# Patient Record
Sex: FEMALE | Race: WHITE | NOT HISPANIC OR LATINO | Employment: OTHER | URBAN - NONMETROPOLITAN AREA
[De-identification: names, ages, dates, MRNs, and addresses within clinical notes are randomized per-mention and may not be internally consistent; named-entity substitution may affect disease eponyms.]

---

## 2021-08-11 ENCOUNTER — NURSE TRIAGE (OUTPATIENT)
Dept: CALL CENTER | Facility: HOSPITAL | Age: 51
End: 2021-08-11

## 2021-08-11 NOTE — TELEPHONE ENCOUNTER
Call transferred from [facility].   Caller wanting to establish care with new PCP. Caller has been having current symptoms since May. Care advice per guideline. Discussed when to seek emergency care.  Call transferred back to [place] at the [facility]. Caller would like new patient appt with Dr. Carina Chan.  Reason for Disposition  • Palpitations    Additional Information  • Negative: Passed out (i.e., lost consciousness, collapsed and was not responding)  • Negative: Shock suspected (e.g., cold/pale/clammy skin, too weak to stand, low BP, rapid pulse)  • Negative: Difficult to awaken or acting confused (e.g., disoriented, slurred speech)  • Negative: Visible sweat on face or sweat dripping down face  • Negative: Unable to walk, or can only walk with assistance (e.g., requires support)  • Negative: [1] Received SHOCK from implantable cardiac defibrillator AND [2] persisting symptoms (i.e., palpitations, lightheadedness)  • Negative: [1] Dizziness, lightheadedness, or weakness AND [2] heart beating very rapidly (e.g., > 140 / minute)  • Negative: [1] Dizziness, lightheadedness, or weakness AND [2] heart beating very slowly  (e.g., < 50 / minute)  • Negative: Sounds like a life-threatening emergency to the triager  • Negative: Chest pain  • Negative: Implantable Cardiac Defibrillator (ICD) or a pacemaker symptoms or questions  • Negative: Difficulty breathing  • Negative: Dizziness, lightheadedness, or weakness  • Negative: [1] Heart beating very rapidly (e.g., > 140 / minute) AND [2] present now  (Exception: during exercise)  • Negative: Heart beating very slowly (e.g., < 50 / minute)  (Exception: athlete and heart rate normal for caller)  • Negative: New or worsened shortness of breath with activity (dyspnea on exertion)  • Negative: Patient sounds very sick or weak to the triager  • Negative: [1] Heart beating very rapidly (e.g., > 140 / minute) AND [2] not present now  (Exception: during exercise)  • Negative: [1] Skipped or  "extra beat(s) AND [2] increases with exercise or exertion  • Negative: [1] Skipped or extra beat(s) AND [2] occurs 4 or more times per minute  • Negative: New or worsened ankle swelling  • Negative: History of heart disease  (i.e., heart attack, bypass surgery, angina, angioplasty, CHF) (Exception: brief heartbeat symptoms that went away and now feels well)  • Negative: Age > 60 years (Exception: brief heartbeat symptoms that went away and now feels well)  • Negative: Taking water pill (i.e., diuretic) or heart medication (e.g., digoxin)  • Negative: Wearing a \"Holter monitor\" or \"cardiac event monitor\"  • Negative: [1] Received SHOCK from implantable cardiac defibrillator AND [2] now feels well  • Negative: Heart rhythm alert (e.g., \"you have irregular heartbeat\") from personal wearable device (e.g., Apple Watch)  • Negative: History of hyperthyroidism or taking thyroid medication  • Negative: Substance use (drug use) or misuse, known or suspected  • Negative: [1] ADHD AND [2] taking stimulant medication  • Negative: [1] Palpitations AND [2] no improvement after using CARE ADVICE  • Negative: Problems with anxiety or stress  • Negative: Palpitations are a chronic symptom (recurrent or ongoing AND present > 4 weeks)  • Negative: [1] Skipped or extra beat(s) AND [2] occurs < 4 times / minute    Answer Assessment - Initial Assessment Questions  1. DESCRIPTION: \"Please describe your heart rate or heartbeat that you are having\" (e.g., fast/slow, regular/irregular, skipped or extra beats, \"palpitations\")     Feels like a bit [place]  2. ONSET: \"When did it start?\" (Minutes, hours or days)   Since May started after second vaccine dose  3. DURATION: \"How long does it last\" (e.g., seconds, minutes, hours)  Last 15-20 minutes after lying down It will do it about 30 seconds apart gradulalu time will increase and will sitop  4. PATTERN \"Does it come and go, or has it been constant since it started?\"  \"Does it get worse with " "exertion?\"   \"Are you feeling it now?\"   comes and goes mostly when lying down  5. TAP: \"Using your hand, can you tap out what you are feeling on a chair or table in front of you, so that I can hear?\" (Note: not all patients can do this)        *No Answer*  6. HEART RATE: \"Can you tell me your heart rate?\" \"How many beats in 15 seconds?\"  (Note: not all patients can do this)        *No Answer*  7. RECURRENT SYMPTOM: \"Have you ever had this before?\" If Yes, ask: \"When was the last time?\" and \"What happened that time?\"       *No Answer*  8. CAUSE: \"What do you think is causing the palpitations?\"  Not sure  9. CARDIAC HISTORY: \"Do you have any history of heart disease?\" (e.g., heart attack, angina, bypass surgery, angioplasty, arrhythmia)    no heart problems     Mother had a flutter  10. OTHER SYMPTOMS: \"Do you have any other symptoms?\" (e.g., dizziness, chest pain, sweating, difficulty breathing)     Denies associated symptoms  11. PREGNANCY: \"Is there any chance you are pregnant?\" \"When was your last menstrual period?\"  na    Protocols used: HEART RATE AND HEARTBEAT QUESTIONS-ADULT-AH      "

## 2021-09-29 ENCOUNTER — OFFICE VISIT (OUTPATIENT)
Dept: INTERNAL MEDICINE | Facility: CLINIC | Age: 51
End: 2021-09-29

## 2021-09-29 VITALS
TEMPERATURE: 97.8 F | RESPIRATION RATE: 18 BRPM | DIASTOLIC BLOOD PRESSURE: 68 MMHG | HEART RATE: 70 BPM | WEIGHT: 185.8 LBS | SYSTOLIC BLOOD PRESSURE: 114 MMHG | BODY MASS INDEX: 29.16 KG/M2 | HEIGHT: 67 IN | OXYGEN SATURATION: 99 %

## 2021-09-29 DIAGNOSIS — E66.3 OVERWEIGHT WITH BODY MASS INDEX (BMI) OF 29 TO 29.9 IN ADULT: Chronic | ICD-10-CM

## 2021-09-29 DIAGNOSIS — M19.041 PRIMARY OSTEOARTHRITIS OF BOTH HANDS: Chronic | ICD-10-CM

## 2021-09-29 DIAGNOSIS — N95.2 POST-MENOPAUSAL ATROPHIC VAGINITIS: Chronic | ICD-10-CM

## 2021-09-29 DIAGNOSIS — N94.19 DYSPAREUNIA DUE TO MEDICAL CONDITION IN FEMALE: Chronic | ICD-10-CM

## 2021-09-29 DIAGNOSIS — Z12.11 COLON CANCER SCREENING: Primary | ICD-10-CM

## 2021-09-29 DIAGNOSIS — R00.2 PALPITATIONS: Chronic | ICD-10-CM

## 2021-09-29 DIAGNOSIS — M19.042 PRIMARY OSTEOARTHRITIS OF BOTH HANDS: Chronic | ICD-10-CM

## 2021-09-29 DIAGNOSIS — G43.009 MIGRAINE WITHOUT AURA AND WITHOUT STATUS MIGRAINOSUS, NOT INTRACTABLE: Chronic | ICD-10-CM

## 2021-09-29 DIAGNOSIS — F41.1 GENERALIZED ANXIETY DISORDER: Chronic | ICD-10-CM

## 2021-09-29 PROCEDURE — 90686 IIV4 VACC NO PRSV 0.5 ML IM: CPT | Performed by: INTERNAL MEDICINE

## 2021-09-29 PROCEDURE — 99204 OFFICE O/P NEW MOD 45 MIN: CPT | Performed by: INTERNAL MEDICINE

## 2021-09-29 PROCEDURE — 90471 IMMUNIZATION ADMIN: CPT | Performed by: INTERNAL MEDICINE

## 2021-09-29 RX ORDER — CHLORAL HYDRATE 500 MG
CAPSULE ORAL DAILY
COMMUNITY
End: 2022-08-23

## 2021-09-29 RX ORDER — UREA 10 %
27 LOTION (ML) TOPICAL DAILY
Qty: 90 TABLET | Refills: 1
Start: 2021-09-29

## 2021-09-29 RX ORDER — MULTIPLE VITAMINS W/ MINERALS TAB 9MG-400MCG
1 TAB ORAL DAILY
COMMUNITY

## 2021-09-30 ENCOUNTER — TELEPHONE (OUTPATIENT)
Dept: INTERNAL MEDICINE | Facility: CLINIC | Age: 51
End: 2021-09-30

## 2021-09-30 RX ORDER — PROGESTERONE 100 MG/1
100 CAPSULE ORAL DAILY
Qty: 30 CAPSULE | Refills: 11 | Status: SHIPPED | OUTPATIENT
Start: 2021-09-30 | End: 2021-12-16 | Stop reason: SDUPTHER

## 2021-09-30 NOTE — TELEPHONE ENCOUNTER
Call patient's pharmacy and call patient.  We are replacing the Climara Pro patch with Climara weekly patch and Prometrium 100 mg tablet.  Advised patient to take the tablet in the evenings since it will help with sleep.

## 2021-09-30 NOTE — TELEPHONE ENCOUNTER
combipatch is not covered by insurance spoke with pharmacy which indicated regal estradiol bi weekly will be covered by insurance and cost would be $30 (monthly) - please advise if you want to change medication

## 2021-10-04 ENCOUNTER — LAB (OUTPATIENT)
Dept: LAB | Facility: HOSPITAL | Age: 51
End: 2021-10-04

## 2021-10-04 DIAGNOSIS — R00.2 PALPITATIONS: ICD-10-CM

## 2021-10-04 DIAGNOSIS — E66.3 OVERWEIGHT WITH BODY MASS INDEX (BMI) OF 29 TO 29.9 IN ADULT: Chronic | ICD-10-CM

## 2021-10-04 DIAGNOSIS — N95.2 POST-MENOPAUSAL ATROPHIC VAGINITIS: Chronic | ICD-10-CM

## 2021-10-04 PROBLEM — M19.041 PRIMARY OSTEOARTHRITIS OF BOTH HANDS: Chronic | Status: ACTIVE | Noted: 2021-10-04

## 2021-10-04 PROBLEM — N94.19 DYSPAREUNIA DUE TO MEDICAL CONDITION IN FEMALE: Chronic | Status: ACTIVE | Noted: 2021-10-04

## 2021-10-04 PROBLEM — F41.1 GENERALIZED ANXIETY DISORDER: Chronic | Status: ACTIVE | Noted: 2021-10-04

## 2021-10-04 PROBLEM — M19.042 PRIMARY OSTEOARTHRITIS OF BOTH HANDS: Chronic | Status: ACTIVE | Noted: 2021-10-04

## 2021-10-04 PROBLEM — G43.009 MIGRAINE WITHOUT AURA AND WITHOUT STATUS MIGRAINOSUS, NOT INTRACTABLE: Chronic | Status: ACTIVE | Noted: 2021-10-04

## 2021-10-04 LAB
ALBUMIN SERPL-MCNC: 4.8 G/DL (ref 3.5–5.2)
ALBUMIN/GLOB SERPL: 2.4 G/DL
ALP SERPL-CCNC: 67 U/L (ref 39–117)
ALT SERPL W P-5'-P-CCNC: 14 U/L (ref 1–33)
ANION GAP SERPL CALCULATED.3IONS-SCNC: 10.6 MMOL/L (ref 5–15)
AST SERPL-CCNC: 16 U/L (ref 1–32)
BASOPHILS # BLD AUTO: 0.02 10*3/MM3 (ref 0–0.2)
BASOPHILS NFR BLD AUTO: 0.4 % (ref 0–1.5)
BILIRUB SERPL-MCNC: 0.6 MG/DL (ref 0–1.2)
BUN SERPL-MCNC: 10 MG/DL (ref 6–20)
BUN/CREAT SERPL: 14.9 (ref 7–25)
CALCIUM SPEC-SCNC: 9.6 MG/DL (ref 8.6–10.5)
CHLORIDE SERPL-SCNC: 104 MMOL/L (ref 98–107)
CHOLEST SERPL-MCNC: 180 MG/DL (ref 0–200)
CO2 SERPL-SCNC: 26.4 MMOL/L (ref 22–29)
CREAT SERPL-MCNC: 0.67 MG/DL (ref 0.57–1)
DEPRECATED RDW RBC AUTO: 44.7 FL (ref 37–54)
EOSINOPHIL # BLD AUTO: 0.09 10*3/MM3 (ref 0–0.4)
EOSINOPHIL NFR BLD AUTO: 1.8 % (ref 0.3–6.2)
ERYTHROCYTE [DISTWIDTH] IN BLOOD BY AUTOMATED COUNT: 12.3 % (ref 12.3–15.4)
GFR SERPL CREATININE-BSD FRML MDRD: 93 ML/MIN/1.73
GLOBULIN UR ELPH-MCNC: 2 GM/DL
GLUCOSE SERPL-MCNC: 103 MG/DL (ref 65–99)
HCT VFR BLD AUTO: 41.9 % (ref 34–46.6)
HDLC SERPL-MCNC: 58 MG/DL (ref 40–60)
HGB BLD-MCNC: 13.9 G/DL (ref 12–15.9)
IMM GRANULOCYTES # BLD AUTO: 0.01 10*3/MM3 (ref 0–0.05)
IMM GRANULOCYTES NFR BLD AUTO: 0.2 % (ref 0–0.5)
LDLC SERPL CALC-MCNC: 99 MG/DL (ref 0–100)
LDLC/HDLC SERPL: 1.66 {RATIO}
LYMPHOCYTES # BLD AUTO: 1.55 10*3/MM3 (ref 0.7–3.1)
LYMPHOCYTES NFR BLD AUTO: 31.4 % (ref 19.6–45.3)
MCH RBC QN AUTO: 32.5 PG (ref 26.6–33)
MCHC RBC AUTO-ENTMCNC: 33.2 G/DL (ref 31.5–35.7)
MCV RBC AUTO: 97.9 FL (ref 79–97)
MONOCYTES # BLD AUTO: 0.4 10*3/MM3 (ref 0.1–0.9)
MONOCYTES NFR BLD AUTO: 8.1 % (ref 5–12)
NEUTROPHILS NFR BLD AUTO: 2.87 10*3/MM3 (ref 1.7–7)
NEUTROPHILS NFR BLD AUTO: 58.1 % (ref 42.7–76)
NRBC BLD AUTO-RTO: 0 /100 WBC (ref 0–0.2)
PLATELET # BLD AUTO: 224 10*3/MM3 (ref 140–450)
PMV BLD AUTO: 11.1 FL (ref 6–12)
POTASSIUM SERPL-SCNC: 4 MMOL/L (ref 3.5–5.2)
PROT SERPL-MCNC: 6.8 G/DL (ref 6–8.5)
RBC # BLD AUTO: 4.28 10*6/MM3 (ref 3.77–5.28)
SODIUM SERPL-SCNC: 141 MMOL/L (ref 136–145)
TRIGL SERPL-MCNC: 128 MG/DL (ref 0–150)
TSH SERPL DL<=0.05 MIU/L-ACNC: 1.35 UIU/ML (ref 0.27–4.2)
VLDLC SERPL-MCNC: 23 MG/DL (ref 5–40)
WBC # BLD AUTO: 4.94 10*3/MM3 (ref 3.4–10.8)

## 2021-10-04 PROCEDURE — 82306 VITAMIN D 25 HYDROXY: CPT

## 2021-10-04 PROCEDURE — 80053 COMPREHEN METABOLIC PANEL: CPT

## 2021-10-04 PROCEDURE — 84443 ASSAY THYROID STIM HORMONE: CPT

## 2021-10-04 PROCEDURE — 81001 URINALYSIS AUTO W/SCOPE: CPT

## 2021-10-04 PROCEDURE — 80061 LIPID PANEL: CPT

## 2021-10-04 PROCEDURE — 85025 COMPLETE CBC W/AUTO DIFF WBC: CPT

## 2021-10-05 LAB
25(OH)D3 SERPL-MCNC: 36.1 NG/ML (ref 30–100)
BACTERIA UR QL AUTO: NORMAL /HPF
BILIRUB UR QL STRIP: NEGATIVE
CLARITY UR: CLEAR
COLOR UR: YELLOW
GLUCOSE UR STRIP-MCNC: NEGATIVE MG/DL
HGB UR QL STRIP.AUTO: NEGATIVE
HYALINE CASTS UR QL AUTO: NORMAL /LPF
KETONES UR QL STRIP: NEGATIVE
LEUKOCYTE ESTERASE UR QL STRIP.AUTO: ABNORMAL
NITRITE UR QL STRIP: NEGATIVE
PH UR STRIP.AUTO: 7 [PH] (ref 5–8)
PROT UR QL STRIP: NEGATIVE
RBC # UR: NORMAL /HPF
REF LAB TEST METHOD: NORMAL
SP GR UR STRIP: 1.01 (ref 1–1.03)
SQUAMOUS #/AREA URNS HPF: NORMAL /HPF
UROBILINOGEN UR QL STRIP: ABNORMAL
WBC UR QL AUTO: NORMAL /HPF

## 2021-10-10 PROBLEM — Z80.0 FAMILY HISTORY OF COLON CANCER IN MOTHER: Chronic | Status: ACTIVE | Noted: 2021-10-10

## 2021-10-10 NOTE — PATIENT INSTRUCTIONS
Patient Instructions   Problem List Items Addressed This Visit        Cardiac and Vasculature    Palpitations (Chronic)    Overview     Palpitations may be stress and anxiety related.     Check labs and rule out thyroid disease.    Decrease caffeine and chocolate in the diet.           Relevant Orders    CBC & Differential (Completed)    Comprehensive Metabolic Panel (Completed)    TSH (Completed)    Urinalysis With Microscopic - Urine, Clean Catch (Completed)       Genitourinary and Reproductive     Dyspareunia due to medical condition in female (Chronic)    Overview     See above.         Post-menopausal atrophic vaginitis (Chronic)    Overview     Hormone replacement therapy benefits and risks discussed in detail.  HRT prescribed.    She will stop over-the-counter amberin menopausal treatment.    New medication added today. Benefits and possible side effects discussed. Patient verbalized understanding.           Relevant Orders    Vitamin D 25 Hydroxy (Completed)       Mental Health    Generalized anxiety disorder (Chronic)    Overview     Anxiety and high levels of stress recently.  Menopause is probably also contributing to her anxiety.    Hormone replacement therapy started today.    Check labs including thyroid.    If not improving, will consider a low-dose antidepressant or buspirone.            Musculoskeletal and Injuries    Primary osteoarthritis of both hands (Chronic)    Overview     May take diclofenac with food up to twice a day as needed.  May also try over-the-counter arthritis creams and rubs.            Neuro    Migraine without aura and without status migrainosus, not intractable (Chronic)    Overview     Migraine symptoms include nausea, fatigue, general arthralgias and myalgias, and light sensitivity.  She has headache pain with the symptoms about 50% of the time.    May take diclofenac with food twice a day as needed.    Track headaches on the calendar and watch for triggers.         Relevant  Medications    diclofenac (VOLTAREN) 50 MG EC tablet       Other    Overweight with body mass index (BMI) of 29 to 29.9 in adult (Chronic)    Overview     10/4/2021 Carina Chan MD    Check labs.    Continue regular exercise.  Increase aerobic exercise to at least 30 minutes daily.    Continue to improve low-fat low sugar diet.  Avoid white carbohydrates, glutens, dairy, processed foods.    Weigh every Monday morning to monitor progress.         Relevant Orders    Lipid Panel (Completed)      Other Visit Diagnoses     Colon cancer screening    -  Primary    Relevant Orders    Ambulatory Referral For Screening Colonoscopy             BMI for Adults  What is BMI?  Body mass index (BMI) is a number that is calculated from a person's weight and height. BMI can help estimate how much of a person's weight is composed of fat. BMI does not measure body fat directly. Rather, it is an alternative to procedures that directly measure body fat, which can be difficult and expensive.  BMI can help identify people who may be at higher risk for certain medical problems.  What are BMI measurements used for?  BMI is used as a screening tool to identify possible weight problems. It helps determine whether a person is obese, overweight, a healthy weight, or underweight.  BMI is useful for:  · Identifying a weight problem that may be related to a medical condition or may increase the risk for medical problems.  · Promoting changes, such as changes in diet and exercise, to help reach a healthy weight. BMI screening can be repeated to see if these changes are working.  How is BMI calculated?  BMI involves measuring your weight in relation to your height. Both height and weight are measured, and the BMI is calculated from those numbers. This can be done either in English ([place] or metric measurements. Note that charts and online BMI calculators are available to help you find your BMI quickly and easily without having to do these  "calculations yourself.  To calculate your BMI in English ([place] measurements:    1. Measure your weight in pounds (lb).  2. Multiply the number of pounds by 703.  ? For example, for a person who weighs 180 lb, multiply that number by 703, which equals 126,540.  3. Measure your height in inches. Then multiply that number by itself to get a measurement called \"inches squared.\"  ? For example, for a person who is 70 inches tall, the \"inches squared\" measurement is 70 inches x 70 inches, which equals 4,900 inches squared.  4. Divide the total from step 2 (number of lb x 703) by the total from step 3 (inches squared): 126,540 ÷ 4,900 = 25.8. This is your BMI.    To calculate your BMI in metric measurements:  1. Measure your weight in kilograms (kg).  2. Measure your height in meters (m). Then multiply that number by itself to get a measurement called \"meters squared.\"  ? For example, for a person who is 1.75 m tall, the \"meters squared\" measurement is 1.75 m x 1.75 m, which is equal to 3.1 meters squared.  3. Divide the number of kilograms (your weight) by the meters squared number. In this example: 70 ÷ 3.1 = 22.6. This is your BMI.  What do the results mean?  BMI charts are used to identify whether you are underweight, normal weight, overweight, or obese. The following guidelines will be used:  · Underweight: BMI less than 18.5.  · Normal weight: BMI between 18.5 and 24.9.  · Overweight: BMI between 25 and 29.9.  · Obese: BMI of 30 or above.  Keep these notes in mind:  · Weight includes both fat and muscle, so someone with a muscular build, such as an athlete, may have a BMI that is higher than 24.9. In cases like these, BMI is not an accurate measure of body fat.  · To determine if excess body fat is the cause of a BMI of 25 or higher, further assessments may need to be done by a health care provider.  · BMI is usually interpreted in the same way for men and women.  Where to find more information  For more information " about BMI, including tools to quickly calculate your BMI, go to these websites:  · Centers for Disease Control and Prevention: www.cdc.gov  · American Heart Association: www.heart.org  · National Heart, Lung, and Blood [place]: www.nhlbi.nih.gov  Summary  · Body mass index (BMI) is a number that is calculated from a person's weight and height.  · BMI may help estimate how much of a person's weight is composed of fat. BMI can help identify those who may be at higher risk for certain medical problems.  · BMI can be measured using English measurements or metric measurements.  · BMI charts are used to identify whether you are underweight, normal weight, overweight, or obese.  This information is not intended to replace advice given to you by your health care provider. Make sure you discuss any questions you have with your health care provider.  Document Revised: 09/09/2020 Document Reviewed: 07/17/2020  [employer] Patient Education © 2021 [employer]      Calorie Counting for Weight Loss  Calories are units of energy. Your body needs a certain number of calories from food to keep going throughout the day. When you eat or drink more calories than your body needs, your body stores the extra calories mostly as fat. When you eat or drink fewer calories than your body needs, your body burns fat to get the energy it needs.  Calorie counting means keeping track of how many calories you eat and drink each day. Calorie counting can be helpful if you need to lose weight. If you eat fewer calories than your body needs, you should lose weight. Ask your health care provider what a healthy weight is for you.  For calorie counting to work, you will need to eat the right number of calories each day to lose a healthy amount of weight per week. A dietitian can help you figure out how many calories you need in a day and will suggest ways to reach your calorie goal.  · A healthy amount of weight to lose each week is usually 1-2 lb  (0.5-0.9 kg). This usually means that your daily calorie intake should be reduced by 500-750 calories.  · Eating 1,200-1,500 calories a day can help most women lose weight.  · Eating 1,500-1,800 calories a day can help most men lose weight.  What do I need to know about calorie counting?  Work with your health care provider or dietitian to determine how many calories you should get each day. To meet your daily calorie goal, you will need to:  · Find out how many calories are in each food that you would like to eat. Try to do this before you eat.  · Decide how much of the food you plan to eat.  · Keep a food log. Do this by writing down what you ate and how many calories it had.  To successfully lose weight, it is important to balance calorie counting with a healthy lifestyle that includes regular activity.  Where do I find calorie information?    The number of calories in a food can be found on a Nutrition Facts label. If a food does not have a Nutrition Facts label, try to look up the calories online or ask your dietitian for help.  Remember that calories are listed per serving. If you choose to have more than one serving of a food, you will have to multiply the calories per serving by the number of servings you plan to eat. For example, the label on a package of bread might say that a serving size is 1 slice and that there are 90 calories in a serving. If you eat 1 slice, you will have eaten 90 calories. If you eat 2 slices, you will have eaten 180 calories.  How do I keep a food log?  After each time that you eat, record the following in your food log as soon as possible:  · What you ate. Be sure to include toppings, sauces, and other extras on the food.  · How much you ate. This can be measured in cups, ounces, or number of items.  · How many calories were in each food and drink.  · The total number of calories in the food you ate.  Keep your food log near you, such as in a pocket-sized notebook or on an mendel or  website on your mobile phone. Some programs will calculate calories for you and show you how many calories you have left to meet your daily goal.  What are some portion-control tips?  · Know how many calories are in a serving. This will help you know how many servings you can have of a certain food.  · Use a measuring cup to measure serving sizes. You could also try weighing out portions on a kitchen scale. With time, you will be able to estimate serving sizes for some foods.  · Take time to put servings of different foods on your favorite plates or in your favorite bowls and cups so you know what a serving looks like.  · Try not to eat straight from a food's packaging, such as from a bag or box. Eating straight from the package makes it hard to see how much you are eating and can lead to overeating. Put the amount you would like to eat in a cup or on a plate to make sure you are eating the right portion.  · Use smaller plates, glasses, and bowls for smaller portions and to prevent overeating.  · Try not to multitask. For example, avoid watching TV or using your computer while eating. If it is time to eat, sit down at a table and enjoy your food. This will help you recognize when you are full. It will also help you be more mindful of what and how much you are eating.  What are tips for following this plan?  Reading food labels  · Check the calorie count compared with the serving size. The serving size may be smaller than what you are used to eating.  · Check the source of the calories. Try to choose foods that are high in protein, fiber, and vitamins, and low in saturated fat, trans fat, and sodium.  Shopping  · Read nutrition labels while you shop. This will help you make healthy decisions about which foods to buy.  · Pay attention to nutrition labels for low-fat or fat-free foods. These foods sometimes have the same number of calories or more calories than the full-fat versions. They also often have added sugar,  starch, or salt to make up for flavor that was removed with the fat.  · Make a grocery list of lower-calorie foods and stick to it.  Cooking  · Try to cook your favorite foods in a healthier way. For example, try baking instead of frying.  · Use low-fat dairy products.  Meal planning  · Use more fruits and vegetables. One-half of your plate should be fruits and vegetables.  · Include lean proteins, such as chicken, turkey, and fish.  Lifestyle  Each week, aim to do one of the following:  · 150 minutes of moderate exercise, such as walking.  · 75 minutes of vigorous exercise, such as running.  General information  · Know how many calories are in the foods you eat most often. This will help you calculate calorie counts faster.  · Find a way of tracking calories that works for you. Get creative. Try different apps or programs if writing down calories does not work for you.  What foods should I eat?    · Eat nutritious foods. It is better to have a nutritious, high-calorie food, such as an avocado, than a food with few nutrients, such as a bag of potato chips.  · Use your calories on foods and drinks that will fill you up and will not leave you hungry soon after eating.  ? Examples of foods that fill you up are nuts and nut butters, vegetables, lean proteins, and high-fiber foods such as whole grains. High-fiber foods are foods with more than 5 g of fiber per serving.  · Pay attention to calories in drinks. Low-calorie drinks include water and unsweetened drinks.  The items listed above may not be a complete list of foods and beverages you can eat. Contact a dietitian for more information.  What foods should I limit?  Limit foods or drinks that are not good sources of vitamins, minerals, or protein or that are high in unhealthy fats. These include:  · Candy.  · Other sweets.  · Sodas, specialty coffee drinks, alcohol, and juice.  The items listed above may not be a complete list of foods and beverages you should avoid.  Contact a dietitian for more information.  How do I count calories when eating out?  · Pay attention to portions. Often, portions are much larger when eating out. Try these tips to keep portions smaller:  ? Consider sharing a meal instead of getting your own.  ? If you get your own meal, eat only half of it. Before you start eating, ask for a container and put half of your meal into it.  ? When available, consider ordering smaller portions from the menu instead of full portions.  · Pay attention to your food and drink choices. Knowing the way food is cooked and what is included with the meal can help you eat fewer calories.  ? If calories are listed on the menu, choose the lower-calorie options.  ? Choose dishes that include vegetables, fruits, whole grains, low-fat dairy products, and lean proteins.  ? Choose items that are boiled, broiled, grilled, or steamed. Avoid items that are buttered, battered, fried, or served with cream sauce. Items labeled as crispy are usually fried, unless stated otherwise.  ? Choose water, low-fat milk, unsweetened iced tea, or other drinks without added sugar. If you want an alcoholic beverage, choose a lower-calorie option, such as a glass of wine or light beer.  ? Ask for dressings, sauces, and syrups on the side. These are usually high in calories, so you should limit the amount you eat.  ? If you want a salad, choose a garden salad and ask for grilled meats. Avoid extra toppings such as izquierdo, cheese, or fried items. Ask for the dressing on the side, or ask for olive oil and vinegar or lemon to use as dressing.  · Estimate how many servings of a food you are given. Knowing serving sizes will help you be aware of how much food you are eating at restaurants.  Where to find more information  · Centers for Disease Control and Prevention: www.cdc.gov  · [place]. Department of Agriculture: myplate.gov  Summary  · Calorie counting means keeping track of how many calories you eat and drink  each day. If you eat fewer calories than your body needs, you should lose weight.  · A healthy amount of weight to lose per week is usually 1-2 lb (0.5-0.9 kg). This usually means reducing your daily calorie intake by 500-750 calories.  · The number of calories in a food can be found on a Nutrition Facts label. If a food does not have a Nutrition Facts label, try to look up the calories online or ask your dietitian for help.  · Use smaller plates, glasses, and bowls for smaller portions and to prevent overeating.  · Use your calories on foods and drinks that will fill you up and not leave you hungry shortly after a meal.  This information is not intended to replace advice given to you by your health care provider. Make sure you discuss any questions you have with your health care provider.  Document Revised: 01/28/2021 Document Reviewed: 01/28/2021  [employer] Patient Education © 2021 [employer]      Exercising to Lose Weight  Exercise is structured, repetitive physical activity to improve fitness and health. Getting regular exercise is important for everyone. It is especially important if you are overweight. Being overweight increases your risk of heart disease, stroke, diabetes, high blood pressure, and several types of cancer. Reducing your calorie intake and exercising can help you lose weight.  Exercise is usually categorized as moderate or vigorous intensity. To lose weight, most people need to do a certain amount of moderate-intensity or vigorous-intensity exercise each week.  Moderate-intensity exercise    Moderate-intensity exercise is any activity that gets you moving enough to burn at least three times more energy (calories) than if you were sitting.  Examples of moderate exercise include:  · Walking a mile in 15 minutes.  · Doing light yard work.  · Biking at an easy pace.  Most people should get at least 150 minutes (2 hours and 30 minutes) a week of moderate-intensity exercise to maintain their body  weight.  Vigorous-intensity exercise  Vigorous-intensity exercise is any activity that gets you moving enough to burn at least six times more calories than if you were sitting. When you exercise at this intensity, you should be working hard enough that you are not able to carry on a conversation.  Examples of vigorous exercise include:  · Running.  · Playing a team sport, such as football, basketball, and soccer.  · Jumping rope.  Most people should get at least 75 minutes (1 hour and 15 minutes) a week of vigorous-intensity exercise to maintain their body weight.  How can exercise affect me?  When you exercise enough to burn more calories than you eat, you lose weight. Exercise also reduces body fat and builds muscle. The more muscle you have, the more calories you burn. Exercise also:  · Improves mood.  · Reduces stress and tension.  · Improves your overall fitness, flexibility, and endurance.  · Increases bone strength.  The amount of exercise you need to lose weight depends on:  · Your age.  · The type of exercise.  · Any health conditions you have.  · Your overall physical ability.  Talk to your health care provider about how much exercise you need and what types of activities are safe for you.  What actions can I take to lose weight?  Nutrition    · Make changes to your diet as told by your health care provider or diet and nutrition specialist (dietitian). This may include:  ? Eating fewer calories.  ? Eating more protein.  ? Eating less unhealthy fats.  ? Eating a diet that includes fresh fruits and vegetables, whole grains, low-fat dairy products, and lean protein.  ? Avoiding foods with added fat, salt, and sugar.  · Drink plenty of water while you exercise to prevent dehydration or heat stroke.    Activity  · Choose an activity that you enjoy and set realistic goals. Your health care provider can help you make an exercise plan that works for you.  · Exercise at a moderate or vigorous intensity most days of  the week.  ? The intensity of exercise may vary from person to person. You can tell how intense a workout is for you by paying attention to your breathing and heartbeat. Most people will notice their breathing and heartbeat get faster with more intense exercise.  · Do resistance training twice each week, such as:  ? Push-ups.  ? Sit-ups.  ? Lifting weights.  ? Using resistance bands.  · Getting short amounts of exercise can be just as helpful as long structured periods of exercise. If you have trouble finding time to exercise, try to include exercise in your daily routine.  ? Get up, stretch, and walk around every 30 minutes throughout the day.  ? Go for a walk during your lunch break.  ? Park your car farther away from your destination.  ? If you take public transportation, get off one stop early and walk the rest of the way.  ? Make phone calls while standing up and walking around.  ? Take the stairs instead of elevators or escalators.  · Wear comfortable clothes and shoes with good support.  · Do not exercise so much that you hurt yourself, feel dizzy, or get very short of breath.  Where to find more information  · [place] Department of Health and Human Services: www.hhs.gov  · Centers for Disease Control and Prevention (CDC): www.cdc.gov  Contact a health care provider:  · Before starting a new exercise program.  · If you have questions or concerns about your weight.  · If you have a medical problem that keeps you from exercising.  Get help right away if you have any of the following while exercising:  · Injury.  · Dizziness.  · Difficulty breathing or shortness of breath that does not go away when you stop exercising.  · Chest pain.  · Rapid heartbeat.  Summary  · Being overweight increases your risk of heart disease, stroke, diabetes, high blood pressure, and several types of cancer.  · Losing weight happens when you burn more calories than you eat.  · Reducing the amount of calories you eat in addition to  getting regular moderate or vigorous exercise each week helps you lose weight.  This information is not intended to replace advice given to you by your health care provider. Make sure you discuss any questions you have with your health care provider.  Document Revised: 04/15/2021 Document Reviewed: 04/15/2021  [employer] Patient Education © 2021 [employer]

## 2021-10-21 DIAGNOSIS — Z12.11 SCREENING FOR COLON CANCER: Primary | ICD-10-CM

## 2021-10-21 RX ORDER — SODIUM, POTASSIUM,MAG SULFATES 17.5-3.13G
1 SOLUTION, RECONSTITUTED, ORAL ORAL TAKE AS DIRECTED
Qty: 354 ML | Refills: 0 | Status: SHIPPED | OUTPATIENT
Start: 2021-10-21

## 2021-12-10 ENCOUNTER — TELEPHONE (OUTPATIENT)
Dept: INTERNAL MEDICINE | Facility: CLINIC | Age: 51
End: 2021-12-10

## 2021-12-10 NOTE — TELEPHONE ENCOUNTER
Caller: Laine Hernandez    Relationship: Self    Best call back number: [phone]    Requested Prescriptions:   PATCHES USED FOR MENOPAUSE    Pharmacy where request should be sent: [facility] [address] [place]  [address] [place] [address] AT [address] [phone] [facility] [phone]      Additional details provided by patient: PATIENT IS OUT OF TOWN AND FORGOT HER PATCHES. PATIENT STATES HOT FLASHES STARTED IMMEDIATLEY    Does the patient have less than a 3 day supply:  [x] Yes  [] No    Kandace Lopez Rep   12/10/21 11:42 EST

## 2021-12-16 ENCOUNTER — OFFICE VISIT (OUTPATIENT)
Dept: INTERNAL MEDICINE | Facility: CLINIC | Age: 51
End: 2021-12-16

## 2021-12-16 VITALS
TEMPERATURE: 98.4 F | SYSTOLIC BLOOD PRESSURE: 112 MMHG | OXYGEN SATURATION: 98 % | HEART RATE: 71 BPM | HEIGHT: 67 IN | DIASTOLIC BLOOD PRESSURE: 72 MMHG | BODY MASS INDEX: 29.16 KG/M2 | RESPIRATION RATE: 16 BRPM | WEIGHT: 185.8 LBS

## 2021-12-16 DIAGNOSIS — R00.2 PALPITATIONS: Primary | Chronic | ICD-10-CM

## 2021-12-16 DIAGNOSIS — N95.2 POST-MENOPAUSAL ATROPHIC VAGINITIS: Chronic | ICD-10-CM

## 2021-12-16 DIAGNOSIS — N94.19 DYSPAREUNIA DUE TO MEDICAL CONDITION IN FEMALE: Chronic | ICD-10-CM

## 2021-12-16 DIAGNOSIS — M19.042 PRIMARY OSTEOARTHRITIS OF BOTH HANDS: Chronic | ICD-10-CM

## 2021-12-16 DIAGNOSIS — E66.3 OVERWEIGHT WITH BODY MASS INDEX (BMI) OF 29 TO 29.9 IN ADULT: Chronic | ICD-10-CM

## 2021-12-16 DIAGNOSIS — Z23 NEED FOR VACCINATION: ICD-10-CM

## 2021-12-16 DIAGNOSIS — N95.9 MENOPAUSAL AND POSTMENOPAUSAL DISORDER: Chronic | ICD-10-CM

## 2021-12-16 DIAGNOSIS — M19.041 PRIMARY OSTEOARTHRITIS OF BOTH HANDS: Chronic | ICD-10-CM

## 2021-12-16 PROBLEM — F41.1 GENERALIZED ANXIETY DISORDER: Chronic | Status: RESOLVED | Noted: 2021-10-04 | Resolved: 2021-12-16

## 2021-12-16 PROCEDURE — 90471 IMMUNIZATION ADMIN: CPT | Performed by: INTERNAL MEDICINE

## 2021-12-16 PROCEDURE — 90750 HZV VACC RECOMBINANT IM: CPT | Performed by: INTERNAL MEDICINE

## 2021-12-16 PROCEDURE — 99214 OFFICE O/P EST MOD 30 MIN: CPT | Performed by: INTERNAL MEDICINE

## 2021-12-16 RX ORDER — PROGESTERONE 100 MG/1
100 CAPSULE ORAL DAILY
Qty: 90 CAPSULE | Refills: 3 | Status: SHIPPED | OUTPATIENT
Start: 2021-12-16 | End: 2022-12-21 | Stop reason: SDUPTHER

## 2021-12-16 NOTE — PROGRESS NOTES
[facility]     Laine Hernandez  1970   [id number]      Patient Care Team:  Carina Chan MD as PCP - General (Internal Medicine)    Chief Complaint   Patient presents with   • Palpitations     f/u   • Abnormal Lab     discuss lipid panel    • Med Refill     discuss which medications needed    • Immunizations     discuss booster             HPI  Patient is a 51 y.o. female presents with  Palpitations have improved over last 6 wks. she has found that they seem to be exacerbated by alcohol sometimes and blood sugars.  She has not found 1 cup of coffee a day to be a problem.  She also thinks she probably has not had a migraine since she started HRT in October.    HPI  She ask about mixing and matching the Covid vaccinations.  She did have palpitations after the second mode during the vaccine so we decided on making her booster a Pfizer vaccine.    HPI  We went over her lipid panel with LDL at 99.  She states that she has decreased her eggs consumption to every other day since we did the blood work.  She states that usually in the past her LDL has been lower than that.    CHRONIC CONDITIONS  Started HRT and it improved mood and maybe plalpitations, and sleeping better.  She does not feel anxious at all.  Vaginal dryness some better.  She has not tried Replens yet.    Hand arthritis is stable.  It is helped by diclofenac.  She takes it with food and has no GI upset with it.    Past Medical History:   Diagnosis Date   • Arthritis    • Depression    • Generalized anxiety disorder 10/4/2021    Anxiety and high levels of stress recently.  Menopause is probably also contributing to her anxiety.  Hormone replacement therapy started today.  Check labs including thyroid.  If not improving, will consider a low-dose antidepressant or buspirone.   • GERD (gastroesophageal reflux disease)    • Headache    • Menorrhalgia 2012    uterine ablation performed       Past Surgical History:   Procedure Laterality  "Date   • BREAST SURGERY  2002    augmentation    • BUNIONECTOMY      2014 and 2015       Family History   Problem Relation Age of Onset   • Arthritis Mother    • Colon cancer Mother 81   • Heart attack Mother 84        cause of death   • COPD Mother    • Arthritis Father    • Diabetes Father         oral med   • Hyperlipidemia Father    • Hypertension Father    • Stroke Father 76   • Obesity Father    • Other Sister         vertigo   • Sleep apnea Brother    • Diabetes Paternal Grandfather        Social History     Socioeconomic History   • Marital status:    Tobacco Use   • Smoking status: Never Smoker   • Smokeless tobacco: Never Used   Vaping Use   • Vaping Use: Never used   Substance and Sexual Activity   • Alcohol use: Yes     Comment: 1 aweek   • Drug use: Never   • Sexual activity: Never       Allergies   Allergen Reactions   • Sulfa Antibiotics Other (See Comments)     Per pt - swollen lymph nodes       Review of Systems:     Review of Systems   Constitutional: Negative for chills, fatigue and fever.   HENT: Negative for congestion, ear pain and sinus pressure.    Respiratory: Negative for cough, chest tightness, shortness of breath and wheezing.    Cardiovascular: Negative for chest pain, palpitations and leg swelling.   Gastrointestinal: Negative for abdominal pain, blood in stool and constipation.   Musculoskeletal: Positive for arthralgias.   Skin: Negative for color change.   Allergic/Immunologic: Negative for environmental allergies.   Neurological: Negative for dizziness and headache.   Psychiatric/Behavioral: Negative for depressed mood. The patient is not nervous/anxious.        Vital Signs  Vitals:    12/16/21 1044   BP: 112/72   BP Location: Left arm   Patient Position: Sitting   Cuff Size: Large Adult   Pulse: 71   Resp: 16   Temp: 98.4 °F (36.9 °C)   TempSrc: Infrared   SpO2: 98%   Weight: 84.3 kg (185 lb 12.8 oz)   Height: 168.9 cm (66.5\")   PainSc: 0-No pain     Body mass index is 29.54 " kg/m².  Patient's Body mass index is 29.54 kg/m². indicating that she is overweight (BMI 25-29.9). Obesity-related health conditions include the following: none. Obesity is unchanged. BMI is is above average; BMI management plan is completed. We discussed low calorie, low carb based diet program, portion control and increasing exercise..        Current Outpatient Medications:   •  Ascorbic Acid (VITAMIN C PO), Take  by mouth Daily., Disp: , Rfl:   •  Cholecalciferol (VITAMIN D3 PO), Take 1,000 Units by mouth 2 (Two) Times a Day., Disp: , Rfl:   •  Coenzyme Q10 (CO Q 10 PO), Take  by mouth Daily., Disp: , Rfl:   •  diclofenac (VOLTAREN) 50 MG EC tablet, Take 1 tablet by mouth 2 (Two) Times a Day As Needed (pain)., Disp: 180 tablet, Rfl: 1  •  estradiol (Climara) 0.025 MG/24HR patch, Place 1 patch on the skin as directed by provider 1 (One) Time Per Week., Disp: 12 each, Rfl: 3  •  Lactobacillus (ACIDOPHILUS PO), Take  by mouth Daily., Disp: , Rfl:   •  magnesium, as, gluconate (MAGONATE) 500 (27 Mg) MG tablet, Take 1 tablet by mouth Daily., Disp: 90 tablet, Rfl: 1  •  multivitamin with minerals (MULTIVITAMIN ADULT PO), Take 1 tablet by mouth Daily., Disp: , Rfl:   •  NIACIN ER PO, Take 3,000 Units by mouth Daily., Disp: , Rfl:   •  Omega-3 Fatty Acids (fish oil) 1000 MG capsule capsule, Take  by mouth Daily. hs, Disp: , Rfl:   •  Progesterone (PROMETRIUM) 100 MG capsule, Take 1 capsule by mouth Daily., Disp: 90 capsule, Rfl: 3  •  silver sulfadiazine (Silvadene) 1 % cream, Apply 1 application topically to the appropriate area as directed 2 (Two) Times a Day., Disp: 25 g, Rfl: 1  •  sodium-potassium-magnesium sulfates (Suprep Bowel Prep Kit) 17.5-3.13-1.6 GM/177ML solution oral solution, Take 1 bottle by mouth Take As Directed. Follow instructions that were mailed to your home. If you didn't receive these call [phone]., Disp: 354 mL, Rfl: 0    Physical Exam:    Physical Exam  Vitals and nursing note reviewed.    Constitutional:       Appearance: She is well-developed and overweight.   HENT:      Head: Normocephalic.   Eyes:      Conjunctiva/sclera: Conjunctivae normal.      Pupils: Pupils are equal, round, and reactive to light.   Neck:      Thyroid: No thyromegaly.   Cardiovascular:      Rate and Rhythm: Normal rate and regular rhythm.      Heart sounds: Normal heart sounds.   Pulmonary:      Effort: Pulmonary effort is normal.      Breath sounds: Normal breath sounds. No wheezing.   Musculoskeletal:         General: Normal range of motion.      Cervical back: Normal range of motion and neck supple.   Lymphadenopathy:      Cervical: No cervical adenopathy.   Skin:     General: Skin is warm and dry.   Neurological:      Mental Status: She is alert and oriented to person, place, and time.   Psychiatric:         Attention and Perception: Attention normal.         Mood and Affect: Mood normal.         Thought Content: Thought content normal.          ACE III MINI        Results Review:    I reviewed the patient's new clinical results.    CMP:  Lab Results   Component Value Date    BUN 10 10/04/2021    CREATININE 0.67 10/04/2021    EGFRIFNONA 93 10/04/2021    BCR 14.9 10/04/2021     10/04/2021    K 4.0 10/04/2021    CO2 26.4 10/04/2021    CALCIUM 9.6 10/04/2021    ALBUMIN 4.80 10/04/2021    BILITOT 0.6 10/04/2021    ALKPHOS 67 10/04/2021    AST 16 10/04/2021    ALT 14 10/04/2021     HbA1c:  No results found for: HGBA1C  Microalbumin:  No results found for: MICROALBUR, POCMALB, POCCREAT  Lipid Panel  Lab Results   Component Value Date    CHOL 180 10/04/2021    TRIG 128 10/04/2021    HDL 58 10/04/2021    LDL 99 10/04/2021    AST 16 10/04/2021    ALT 14 10/04/2021       Medication Review: Medications reviewed and noted  Patient Instructions   Problem List Items Addressed This Visit        Cardiac and Vasculature    Palpitations - Primary (Chronic)    Overview     Palpitations have improved.  May be alcohol or sugar  related.  HRT may have played a role in improving the palpitations.      She will continue to avoid excessive caffeine and chocolate in the diet.              Genitourinary and Reproductive     Dyspareunia due to medical condition in female (Chronic)    Overview     See above.         Post-menopausal atrophic vaginitis (Chronic)    Overview     Continue estradiol patch weekly and progesterone tablet every evening.    She will try Replens vaginal moisturizer.  We also discussed the possibility of compounding a coconut oil vaginal cream.         Menopausal and postmenopausal disorder (Chronic)    Overview     12/16/2021 Carina Chan MD    Since starting HRT in October 2021, patient feels much better.  She is having less palpitations, less mood irritability, less anxious, sleeping better, less vaginal dryness, less migraines.    Continue Climara estradiol patch weekly and progesterone capsule nightly.            Musculoskeletal and Injuries    Primary osteoarthritis of both hands (Chronic)    Overview     12/16/2021 Carina Chan MD    May take diclofenac with food up to twice a day as needed.  May also try over-the-counter arthritis creams and rubs as needed.            Other    Overweight with body mass index (BMI) of 29 to 29.9 in adult (Chronic)    Overview     12/16/2021 Carina Chan MD    Continue regular exercise.  Increase aerobic exercise to at least 30 minutes daily.    Continue to improve low-fat low sugar diet.  Avoid white carbohydrates, glutens, dairy, processed foods.    Weigh every Monday morning to monitor progress.           Other Visit Diagnoses     Need for vaccination        Relevant Orders    Shingrix Vaccine             Diagnosis Plan   1. Palpitations     2. Menopausal and postmenopausal disorder     3. Post-menopausal atrophic vaginitis     4. Dyspareunia due to medical condition in female     5. Primary osteoarthritis of both hands     6. Overweight with body mass index (BMI) of  29 to 29.9 in adult     7. Need for vaccination  Shingrix Vaccine           Plan of care reviewed with patient at the conclusion of today's visit. Education was provided regarding diagnosis, management, and any prescribed or recommended OTC medications.Patient verbalizes understanding of and agreement with management plan.         Carina Chan MD

## 2021-12-16 NOTE — PATIENT INSTRUCTIONS
Patient Instructions   Problem List Items Addressed This Visit        Cardiac and Vasculature    Palpitations - Primary (Chronic)    Overview     Palpitations have improved.  May be alcohol or sugar related.  HRT may have played a role in improving the palpitations.      She will continue to avoid excessive caffeine and chocolate in the diet.              Genitourinary and Reproductive     Dyspareunia due to medical condition in female (Chronic)    Overview     See above.         Post-menopausal atrophic vaginitis (Chronic)    Overview     Continue estradiol patch weekly and progesterone tablet every evening.    She will try Replens vaginal moisturizer.  We also discussed the possibility of compounding a coconut oil vaginal cream.         Menopausal and postmenopausal disorder (Chronic)    Overview     12/16/2021 Craina Chan MD    Since starting HRT in October 2021, patient feels much better.  She is having less palpitations, less mood irritability, less anxious, sleeping better, less vaginal dryness, less migraines.    Continue Climara estradiol patch weekly and progesterone capsule nightly.            Musculoskeletal and Injuries    Primary osteoarthritis of both hands (Chronic)    Overview     12/16/2021 Carina Chan MD    May take diclofenac with food up to twice a day as needed.  May also try over-the-counter arthritis creams and rubs as needed.            Other    Overweight with body mass index (BMI) of 29 to 29.9 in adult (Chronic)    Overview     12/16/2021 Carina Chan MD    Continue regular exercise.  Increase aerobic exercise to at least 30 minutes daily.    Continue to improve low-fat low sugar diet.  Avoid white carbohydrates, glutens, dairy, processed foods.    Weigh every Monday morning to monitor progress.           Other Visit Diagnoses     Need for vaccination        Relevant Orders    Shingrix Vaccine             BMI for Adults  What is BMI?  Body mass index (BMI) is a number that  "is calculated from a person's weight and height. BMI can help estimate how much of a person's weight is composed of fat. BMI does not measure body fat directly. Rather, it is an alternative to procedures that directly measure body fat, which can be difficult and expensive.  BMI can help identify people who may be at higher risk for certain medical problems.  What are BMI measurements used for?  BMI is used as a screening tool to identify possible weight problems. It helps determine whether a person is obese, overweight, a healthy weight, or underweight.  BMI is useful for:  · Identifying a weight problem that may be related to a medical condition or may increase the risk for medical problems.  · Promoting changes, such as changes in diet and exercise, to help reach a healthy weight. BMI screening can be repeated to see if these changes are working.  How is BMI calculated?  BMI involves measuring your weight in relation to your height. Both height and weight are measured, and the BMI is calculated from those numbers. This can be done either in English ([place] or metric measurements. Note that charts and online BMI calculators are available to help you find your BMI quickly and easily without having to do these calculations yourself.  To calculate your BMI in English ([place] measurements:    1. Measure your weight in pounds (lb).  2. Multiply the number of pounds by 703.  ? For example, for a person who weighs 180 lb, multiply that number by 703, which equals 126,540.  3. Measure your height in inches. Then multiply that number by itself to get a measurement called \"inches squared.\"  ? For example, for a person who is 70 inches tall, the \"inches squared\" measurement is 70 inches x 70 inches, which equals 4,900 inches squared.  4. Divide the total from step 2 (number of lb x 703) by the total from step 3 (inches squared): 126,540 ÷ 4,900 = 25.8. This is your BMI.    To calculate your BMI in metric " "measurements:  1. Measure your weight in kilograms (kg).  2. Measure your height in meters (m). Then multiply that number by itself to get a measurement called \"meters squared.\"  ? For example, for a person who is 1.75 m tall, the \"meters squared\" measurement is 1.75 m x 1.75 m, which is equal to 3.1 meters squared.  3. Divide the number of kilograms (your weight) by the meters squared number. In this example: 70 ÷ 3.1 = 22.6. This is your BMI.  What do the results mean?  BMI charts are used to identify whether you are underweight, normal weight, overweight, or obese. The following guidelines will be used:  · Underweight: BMI less than 18.5.  · Normal weight: BMI between 18.5 and 24.9.  · Overweight: BMI between 25 and 29.9.  · Obese: BMI of 30 or above.  Keep these notes in mind:  · Weight includes both fat and muscle, so someone with a muscular build, such as an athlete, may have a BMI that is higher than 24.9. In cases like these, BMI is not an accurate measure of body fat.  · To determine if excess body fat is the cause of a BMI of 25 or higher, further assessments may need to be done by a health care provider.  · BMI is usually interpreted in the same way for men and women.  Where to find more information  For more information about BMI, including tools to quickly calculate your BMI, go to these websites:  · Centers for Disease Control and Prevention: www.cdc.gov  · American Heart Association: www.heart.org  · National Heart, Lung, and Blood [place]: www.nhlbi.nih.gov  Summary  · Body mass index (BMI) is a number that is calculated from a person's weight and height.  · BMI may help estimate how much of a person's weight is composed of fat. BMI can help identify those who may be at higher risk for certain medical problems.  · BMI can be measured using English measurements or metric measurements.  · BMI charts are used to identify whether you are underweight, normal weight, overweight, or obese.  This " information is not intended to replace advice given to you by your health care provider. Make sure you discuss any questions you have with your health care provider.  Document Revised: 09/09/2020 Document Reviewed: 07/17/2020  Elsevier Patient Education © 2021 [employer]      Calorie Counting for Weight Loss  Calories are units of energy. Your body needs a certain number of calories from food to keep going throughout the day. When you eat or drink more calories than your body needs, your body stores the extra calories mostly as fat. When you eat or drink fewer calories than your body needs, your body burns fat to get the energy it needs.  Calorie counting means keeping track of how many calories you eat and drink each day. Calorie counting can be helpful if you need to lose weight. If you eat fewer calories than your body needs, you should lose weight. Ask your health care provider what a healthy weight is for you.  For calorie counting to work, you will need to eat the right number of calories each day to lose a healthy amount of weight per week. A dietitian can help you figure out how many calories you need in a day and will suggest ways to reach your calorie goal.  · A healthy amount of weight to lose each week is usually 1-2 lb (0.5-0.9 kg). This usually means that your daily calorie intake should be reduced by 500-750 calories.  · Eating 1,200-1,500 calories a day can help most women lose weight.  · Eating 1,500-1,800 calories a day can help most men lose weight.  What do I need to know about calorie counting?  Work with your health care provider or dietitian to determine how many calories you should get each day. To meet your daily calorie goal, you will need to:  · Find out how many calories are in each food that you would like to eat. Try to do this before you eat.  · Decide how much of the food you plan to eat.  · Keep a food log. Do this by writing down what you ate and how many calories it had.  To  successfully lose weight, it is important to balance calorie counting with a healthy lifestyle that includes regular activity.  Where do I find calorie information?    The number of calories in a food can be found on a Nutrition Facts label. If a food does not have a Nutrition Facts label, try to look up the calories online or ask your dietitian for help.  Remember that calories are listed per serving. If you choose to have more than one serving of a food, you will have to multiply the calories per serving by the number of servings you plan to eat. For example, the label on a package of bread might say that a serving size is 1 slice and that there are 90 calories in a serving. If you eat 1 slice, you will have eaten 90 calories. If you eat 2 slices, you will have eaten 180 calories.  How do I keep a food log?  After each time that you eat, record the following in your food log as soon as possible:  · What you ate. Be sure to include toppings, sauces, and other extras on the food.  · How much you ate. This can be measured in cups, ounces, or number of items.  · How many calories were in each food and drink.  · The total number of calories in the food you ate.  Keep your food log near you, such as in a pocket-sized notebook or on an mendel or website on your mobile phone. Some programs will calculate calories for you and show you how many calories you have left to meet your daily goal.  What are some portion-control tips?  · Know how many calories are in a serving. This will help you know how many servings you can have of a certain food.  · Use a measuring cup to measure serving sizes. You could also try weighing out portions on a kitchen scale. With time, you will be able to estimate serving sizes for some foods.  · Take time to put servings of different foods on your favorite plates or in your favorite bowls and cups so you know what a serving looks like.  · Try not to eat straight from a food's packaging, such as from  a bag or box. Eating straight from the package makes it hard to see how much you are eating and can lead to overeating. Put the amount you would like to eat in a cup or on a plate to make sure you are eating the right portion.  · Use smaller plates, glasses, and bowls for smaller portions and to prevent overeating.  · Try not to multitask. For example, avoid watching TV or using your computer while eating. If it is time to eat, sit down at a table and enjoy your food. This will help you recognize when you are full. It will also help you be more mindful of what and how much you are eating.  What are tips for following this plan?  Reading food labels  · Check the calorie count compared with the serving size. The serving size may be smaller than what you are used to eating.  · Check the source of the calories. Try to choose foods that are high in protein, fiber, and vitamins, and low in saturated fat, trans fat, and sodium.  Shopping  · Read nutrition labels while you shop. This will help you make healthy decisions about which foods to buy.  · Pay attention to nutrition labels for low-fat or fat-free foods. These foods sometimes have the same number of calories or more calories than the full-fat versions. They also often have added sugar, starch, or salt to make up for flavor that was removed with the fat.  · Make a grocery list of lower-calorie foods and stick to it.  Cooking  · Try to cook your favorite foods in a healthier way. For example, try baking instead of frying.  · Use low-fat dairy products.  Meal planning  · Use more fruits and vegetables. One-half of your plate should be fruits and vegetables.  · Include lean proteins, such as chicken, turkey, and fish.  Lifestyle  Each week, aim to do one of the following:  · 150 minutes of moderate exercise, such as walking.  · 75 minutes of vigorous exercise, such as running.  General information  · Know how many calories are in the foods you eat most often. This will  help you calculate calorie counts faster.  · Find a way of tracking calories that works for you. Get creative. Try different apps or programs if writing down calories does not work for you.  What foods should I eat?    · Eat nutritious foods. It is better to have a nutritious, high-calorie food, such as an avocado, than a food with few nutrients, such as a bag of potato chips.  · Use your calories on foods and drinks that will fill you up and will not leave you hungry soon after eating.  ? Examples of foods that fill you up are nuts and nut butters, vegetables, lean proteins, and high-fiber foods such as whole grains. High-fiber foods are foods with more than 5 g of fiber per serving.  · Pay attention to calories in drinks. Low-calorie drinks include water and unsweetened drinks.  The items listed above may not be a complete list of foods and beverages you can eat. Contact a dietitian for more information.  What foods should I limit?  Limit foods or drinks that are not good sources of vitamins, minerals, or protein or that are high in unhealthy fats. These include:  · Candy.  · Other sweets.  · Sodas, specialty coffee drinks, alcohol, and juice.  The items listed above may not be a complete list of foods and beverages you should avoid. Contact a dietitian for more information.  How do I count calories when eating out?  · Pay attention to portions. Often, portions are much larger when eating out. Try these tips to keep portions smaller:  ? Consider sharing a meal instead of getting your own.  ? If you get your own meal, eat only half of it. Before you start eating, ask for a container and put half of your meal into it.  ? When available, consider ordering smaller portions from the menu instead of full portions.  · Pay attention to your food and drink choices. Knowing the way food is cooked and what is included with the meal can help you eat fewer calories.  ? If calories are listed on the menu, choose the  lower-calorie options.  ? Choose dishes that include vegetables, fruits, whole grains, low-fat dairy products, and lean proteins.  ? Choose items that are boiled, broiled, grilled, or steamed. Avoid items that are buttered, battered, fried, or served with cream sauce. Items labeled as crispy are usually fried, unless stated otherwise.  ? Choose water, low-fat milk, unsweetened iced tea, or other drinks without added sugar. If you want an alcoholic beverage, choose a lower-calorie option, such as a glass of wine or light beer.  ? Ask for dressings, sauces, and syrups on the side. These are usually high in calories, so you should limit the amount you eat.  ? If you want a salad, choose a garden salad and ask for grilled meats. Avoid extra toppings such as izquierdo, cheese, or fried items. Ask for the dressing on the side, or ask for olive oil and vinegar or lemon to use as dressing.  · Estimate how many servings of a food you are given. Knowing serving sizes will help you be aware of how much food you are eating at restaurants.  Where to find more information  · Centers for Disease Control and Prevention: www.cdc.gov  · [place] Department of Agriculture: myplate.gov  Summary  · Calorie counting means keeping track of how many calories you eat and drink each day. If you eat fewer calories than your body needs, you should lose weight.  · A healthy amount of weight to lose per week is usually 1-2 lb (0.5-0.9 kg). This usually means reducing your daily calorie intake by 500-750 calories.  · The number of calories in a food can be found on a Nutrition Facts label. If a food does not have a Nutrition Facts label, try to look up the calories online or ask your dietitian for help.  · Use smaller plates, glasses, and bowls for smaller portions and to prevent overeating.  · Use your calories on foods and drinks that will fill you up and not leave you hungry shortly after a meal.  This information is not intended to replace advice  given to you by your health care provider. Make sure you discuss any questions you have with your health care provider.  Document Revised: 01/28/2021 Document Reviewed: 01/28/2021  Elsevier Patient Education © 2021 [employer]

## 2022-06-15 DIAGNOSIS — E66.3 OVERWEIGHT WITH BODY MASS INDEX (BMI) OF 29 TO 29.9 IN ADULT: ICD-10-CM

## 2022-06-15 DIAGNOSIS — M19.041 PRIMARY OSTEOARTHRITIS OF BOTH HANDS: ICD-10-CM

## 2022-06-15 DIAGNOSIS — Z13.220 LIPID SCREENING: ICD-10-CM

## 2022-06-15 DIAGNOSIS — R00.2 PALPITATIONS: Primary | ICD-10-CM

## 2022-06-15 DIAGNOSIS — M19.042 PRIMARY OSTEOARTHRITIS OF BOTH HANDS: ICD-10-CM

## 2022-06-15 DIAGNOSIS — R73.09 ABNORMAL GLUCOSE: ICD-10-CM

## 2022-06-15 DIAGNOSIS — R07.2 PRECORDIAL PAIN: ICD-10-CM

## 2022-06-16 ENCOUNTER — LAB (OUTPATIENT)
Dept: LAB | Facility: HOSPITAL | Age: 52
End: 2022-06-16

## 2022-06-16 DIAGNOSIS — R00.2 PALPITATIONS: ICD-10-CM

## 2022-06-16 DIAGNOSIS — Z13.220 LIPID SCREENING: ICD-10-CM

## 2022-06-16 DIAGNOSIS — R73.09 ABNORMAL GLUCOSE: ICD-10-CM

## 2022-06-16 LAB
25(OH)D3 SERPL-MCNC: 42.3 NG/ML (ref 30–100)
ALBUMIN SERPL-MCNC: 4.7 G/DL (ref 3.5–5.2)
ALBUMIN/GLOB SERPL: 1.7 G/DL
ALP SERPL-CCNC: 83 U/L (ref 39–117)
ALT SERPL W P-5'-P-CCNC: 18 U/L (ref 1–33)
ANION GAP SERPL CALCULATED.3IONS-SCNC: 13 MMOL/L (ref 5–15)
AST SERPL-CCNC: 19 U/L (ref 1–32)
BASOPHILS # BLD AUTO: 0.02 10*3/MM3 (ref 0–0.2)
BASOPHILS NFR BLD AUTO: 0.3 % (ref 0–1.5)
BILIRUB SERPL-MCNC: 0.5 MG/DL (ref 0–1.2)
BILIRUB UR QL STRIP: NEGATIVE
BUN SERPL-MCNC: 14 MG/DL (ref 6–20)
BUN/CREAT SERPL: 18.4 (ref 7–25)
CALCIUM SPEC-SCNC: 9.6 MG/DL (ref 8.6–10.5)
CHLORIDE SERPL-SCNC: 100 MMOL/L (ref 98–107)
CHOLEST SERPL-MCNC: 202 MG/DL (ref 0–200)
CLARITY UR: CLEAR
CO2 SERPL-SCNC: 24 MMOL/L (ref 22–29)
COLOR UR: YELLOW
CREAT SERPL-MCNC: 0.76 MG/DL (ref 0.57–1)
DEPRECATED RDW RBC AUTO: 39.2 FL (ref 37–54)
EGFRCR SERPLBLD CKD-EPI 2021: 94.4 ML/MIN/1.73
EOSINOPHIL # BLD AUTO: 0.09 10*3/MM3 (ref 0–0.4)
EOSINOPHIL NFR BLD AUTO: 1.4 % (ref 0.3–6.2)
ERYTHROCYTE [DISTWIDTH] IN BLOOD BY AUTOMATED COUNT: 11.7 % (ref 12.3–15.4)
GLOBULIN UR ELPH-MCNC: 2.8 GM/DL
GLUCOSE SERPL-MCNC: 86 MG/DL (ref 65–99)
GLUCOSE UR STRIP-MCNC: NEGATIVE MG/DL
HBA1C MFR BLD: 5.6 % (ref 4.8–5.6)
HCT VFR BLD AUTO: 40.2 % (ref 34–46.6)
HDLC SERPL-MCNC: 56 MG/DL (ref 40–60)
HGB BLD-MCNC: 13.9 G/DL (ref 12–15.9)
HGB UR QL STRIP.AUTO: NEGATIVE
IMM GRANULOCYTES # BLD AUTO: 0.02 10*3/MM3 (ref 0–0.05)
IMM GRANULOCYTES NFR BLD AUTO: 0.3 % (ref 0–0.5)
KETONES UR QL STRIP: NEGATIVE
LDLC SERPL CALC-MCNC: 122 MG/DL (ref 0–100)
LDLC/HDLC SERPL: 2.12 {RATIO}
LEUKOCYTE ESTERASE UR QL STRIP.AUTO: ABNORMAL
LYMPHOCYTES # BLD AUTO: 1.9 10*3/MM3 (ref 0.7–3.1)
LYMPHOCYTES NFR BLD AUTO: 29.7 % (ref 19.6–45.3)
MCH RBC QN AUTO: 32.6 PG (ref 26.6–33)
MCHC RBC AUTO-ENTMCNC: 34.6 G/DL (ref 31.5–35.7)
MCV RBC AUTO: 94.4 FL (ref 79–97)
MONOCYTES # BLD AUTO: 0.48 10*3/MM3 (ref 0.1–0.9)
MONOCYTES NFR BLD AUTO: 7.5 % (ref 5–12)
NEUTROPHILS NFR BLD AUTO: 3.88 10*3/MM3 (ref 1.7–7)
NEUTROPHILS NFR BLD AUTO: 60.8 % (ref 42.7–76)
NITRITE UR QL STRIP: NEGATIVE
NRBC BLD AUTO-RTO: 0 /100 WBC (ref 0–0.2)
PH UR STRIP.AUTO: 7 [PH] (ref 5–8)
PLATELET # BLD AUTO: 271 10*3/MM3 (ref 140–450)
PMV BLD AUTO: 10.7 FL (ref 6–12)
POTASSIUM SERPL-SCNC: 4.3 MMOL/L (ref 3.5–5.2)
PROT SERPL-MCNC: 7.5 G/DL (ref 6–8.5)
PROT UR QL STRIP: NEGATIVE
RBC # BLD AUTO: 4.26 10*6/MM3 (ref 3.77–5.28)
SODIUM SERPL-SCNC: 137 MMOL/L (ref 136–145)
SP GR UR STRIP: 1.02 (ref 1–1.03)
TRIGL SERPL-MCNC: 137 MG/DL (ref 0–150)
TSH SERPL DL<=0.05 MIU/L-ACNC: 0.72 UIU/ML (ref 0.27–4.2)
UROBILINOGEN UR QL STRIP: ABNORMAL
VLDLC SERPL-MCNC: 24 MG/DL (ref 5–40)
WBC NRBC COR # BLD: 6.39 10*3/MM3 (ref 3.4–10.8)

## 2022-06-16 PROCEDURE — 84443 ASSAY THYROID STIM HORMONE: CPT

## 2022-06-16 PROCEDURE — 85025 COMPLETE CBC W/AUTO DIFF WBC: CPT

## 2022-06-16 PROCEDURE — 83036 HEMOGLOBIN GLYCOSYLATED A1C: CPT

## 2022-06-16 PROCEDURE — 82306 VITAMIN D 25 HYDROXY: CPT

## 2022-06-16 PROCEDURE — 80053 COMPREHEN METABOLIC PANEL: CPT

## 2022-06-16 PROCEDURE — 81001 URINALYSIS AUTO W/SCOPE: CPT

## 2022-06-16 PROCEDURE — 80061 LIPID PANEL: CPT

## 2022-06-17 LAB
BACTERIA UR QL AUTO: ABNORMAL /HPF
HYALINE CASTS UR QL AUTO: ABNORMAL /LPF
RBC # UR STRIP: ABNORMAL /HPF
REF LAB TEST METHOD: ABNORMAL
SQUAMOUS #/AREA URNS HPF: ABNORMAL /HPF
WBC # UR STRIP: ABNORMAL /HPF

## 2022-06-21 ENCOUNTER — HOSPITAL ENCOUNTER (OUTPATIENT)
Dept: CARDIOLOGY | Facility: HOSPITAL | Age: 52
Discharge: HOME | End: 2022-06-21
Admitting: INTERNAL MEDICINE

## 2022-06-21 ENCOUNTER — OFFICE VISIT (OUTPATIENT)
Dept: INTERNAL MEDICINE | Facility: CLINIC | Age: 52
End: 2022-06-21

## 2022-06-21 VITALS
HEIGHT: 66 IN | SYSTOLIC BLOOD PRESSURE: 116 MMHG | TEMPERATURE: 99.1 F | BODY MASS INDEX: 30.63 KG/M2 | DIASTOLIC BLOOD PRESSURE: 84 MMHG | WEIGHT: 190.6 LBS | HEART RATE: 64 BPM

## 2022-06-21 VITALS — WEIGHT: 190 LBS | BODY MASS INDEX: 31.65 KG/M2 | HEIGHT: 65 IN

## 2022-06-21 DIAGNOSIS — M19.041 PRIMARY OSTEOARTHRITIS OF BOTH HANDS: Chronic | ICD-10-CM

## 2022-06-21 DIAGNOSIS — C43.30 MALIGNANT MELANOMA OF FACE EXCLUDING EYELID, NOSE, LIP, AND EAR: Chronic | ICD-10-CM

## 2022-06-21 DIAGNOSIS — M19.042 PRIMARY OSTEOARTHRITIS OF BOTH HANDS: Chronic | ICD-10-CM

## 2022-06-21 DIAGNOSIS — R42 DIZZINESS: Chronic | ICD-10-CM

## 2022-06-21 DIAGNOSIS — E66.09 CLASS 1 OBESITY DUE TO EXCESS CALORIES WITH SERIOUS COMORBIDITY AND BODY MASS INDEX (BMI) OF 31.0 TO 31.9 IN ADULT: Chronic | ICD-10-CM

## 2022-06-21 DIAGNOSIS — R07.2 PRECORDIAL PAIN: ICD-10-CM

## 2022-06-21 DIAGNOSIS — H91.21 SUDDEN IDIOPATHIC HEARING LOSS OF RIGHT EAR WITH UNRESTRICTED HEARING OF LEFT EAR: Chronic | ICD-10-CM

## 2022-06-21 DIAGNOSIS — Z00.00 ANNUAL PHYSICAL EXAM: Primary | ICD-10-CM

## 2022-06-21 DIAGNOSIS — Z23 NEED FOR VACCINATION: ICD-10-CM

## 2022-06-21 DIAGNOSIS — R05.9 COUGH IN ADULT: Chronic | ICD-10-CM

## 2022-06-21 DIAGNOSIS — R43.2 DYSGEUSIA: Chronic | ICD-10-CM

## 2022-06-21 DIAGNOSIS — E78.00 HYPERCHOLESTEROLEMIA: Chronic | ICD-10-CM

## 2022-06-21 DIAGNOSIS — M67.442 GANGLION CYST OF FLEXOR TENDON SHEATH OF FINGER OF LEFT HAND: Chronic | ICD-10-CM

## 2022-06-21 DIAGNOSIS — N95.9 MENOPAUSAL AND POSTMENOPAUSAL DISORDER: Chronic | ICD-10-CM

## 2022-06-21 DIAGNOSIS — L57.0 ACTINIC KERATOSIS OF LEFT CHEEK: Chronic | ICD-10-CM

## 2022-06-21 PROBLEM — E66.811 CLASS 1 OBESITY DUE TO EXCESS CALORIES WITH SERIOUS COMORBIDITY AND BODY MASS INDEX (BMI) OF 31.0 TO 31.9 IN ADULT: Chronic | Status: ACTIVE | Noted: 2022-06-21

## 2022-06-21 LAB
BH CV ECHO MEAS - AO MAX PG: 7.6 MMHG
BH CV ECHO MEAS - AO MEAN PG: 3 MMHG
BH CV ECHO MEAS - AO V2 MAX: 138 CM/SEC
BH CV ECHO MEAS - AO V2 VTI: 29.5 CM
BH CV ECHO MEAS - EDV(CUBED): 77.9 ML
BH CV ECHO MEAS - EDV(MOD-SP2): 132 ML
BH CV ECHO MEAS - EDV(MOD-SP4): 115 ML
BH CV ECHO MEAS - EF(MOD-BP): 62 %
BH CV ECHO MEAS - EF(MOD-SP2): 62.9 %
BH CV ECHO MEAS - EF(MOD-SP4): 60.9 %
BH CV ECHO MEAS - ESV(CUBED): 24.9 ML
BH CV ECHO MEAS - ESV(MOD-SP2): 49 ML
BH CV ECHO MEAS - ESV(MOD-SP4): 45 ML
BH CV ECHO MEAS - FS: 31.6 %
BH CV ECHO MEAS - IVS/LVPW: 1 CM
BH CV ECHO MEAS - IVSD: 1.02 CM
BH CV ECHO MEAS - LA DIMENSION: 3.5 CM
BH CV ECHO MEAS - LV MASS(C)D: 144.9 GRAMS
BH CV ECHO MEAS - LV MAX PG: 5.7 MMHG
BH CV ECHO MEAS - LV MEAN PG: 2 MMHG
BH CV ECHO MEAS - LV V1 MAX: 119 CM/SEC
BH CV ECHO MEAS - LV V1 VTI: 24.7 CM
BH CV ECHO MEAS - LVIDD: 4.3 CM
BH CV ECHO MEAS - LVIDS: 2.9 CM
BH CV ECHO MEAS - LVPWD: 1.02 CM
BH CV ECHO MEAS - MV A MAX VEL: 75 CM/SEC
BH CV ECHO MEAS - MV DEC SLOPE: 518 CM/SEC2
BH CV ECHO MEAS - MV DEC TIME: 0.18 MSEC
BH CV ECHO MEAS - MV E MAX VEL: 77 CM/SEC
BH CV ECHO MEAS - MV E/A: 1.03
BH CV ECHO MEAS - MV MAX PG: 4.4 MMHG
BH CV ECHO MEAS - MV MEAN PG: 2 MMHG
BH CV ECHO MEAS - MV P1/2T: 62.8 MSEC
BH CV ECHO MEAS - MV V2 VTI: 28.6 CM
BH CV ECHO MEAS - MVA(P1/2T): 3.5 CM2
BH CV ECHO MEAS - PA ACC SLOPE: 656 CM/SEC2
BH CV ECHO MEAS - PA ACC TIME: 0.16 SEC
BH CV ECHO MEAS - PA PR(ACCEL): 9.3 MMHG
BH CV ECHO MEAS - SV(MOD-SP2): 83 ML
BH CV ECHO MEAS - SV(MOD-SP4): 70 ML
BH CV ECHO MEAS - TAPSE (>1.6): 1.6 CM
BH CV ECHO MEAS - TR MAX PG: 16.6 MMHG
BH CV ECHO MEAS - TR MAX VEL: 204 CM/SEC
BH CV VAS BP LEFT ARM: NORMAL MMHG
BH CV XLRA - RV BASE: 4.1 CM
BH CV XLRA - RV LENGTH: 5 CM
BH CV XLRA - RV MID: 2.9 CM
BH CV XLRA - TDI S': 11.6 CM/SEC
LEFT ATRIUM VOLUME INDEX: 20.1 ML/M2
LV EF 2D ECHO EST: 65 %
MAXIMAL PREDICTED HEART RATE: 168 BPM
STRESS TARGET HR: 143 BPM

## 2022-06-21 PROCEDURE — 99396 PREV VISIT EST AGE 40-64: CPT | Performed by: INTERNAL MEDICINE

## 2022-06-21 PROCEDURE — 93306 TTE W/DOPPLER COMPLETE: CPT

## 2022-06-21 PROCEDURE — 99213 OFFICE O/P EST LOW 20 MIN: CPT | Performed by: INTERNAL MEDICINE

## 2022-06-21 RX ORDER — FEXOFENADINE HCL 180 MG/1
180 TABLET ORAL DAILY PRN
Start: 2022-06-21

## 2022-06-21 RX ORDER — OMEPRAZOLE 40 MG/1
40 CAPSULE, DELAYED RELEASE ORAL DAILY
Qty: 90 CAPSULE | Refills: 1 | Status: SHIPPED | OUTPATIENT
Start: 2022-06-21 | End: 2022-12-19

## 2022-06-21 RX ORDER — ESTRADIOL 1.53 MG/1
1 SPRAY TRANSDERMAL DAILY
Qty: 1 EACH | Refills: 12 | Status: SHIPPED | OUTPATIENT
Start: 2022-06-21 | End: 2022-08-23

## 2022-06-21 NOTE — ASSESSMENT & PLAN NOTE
She is being referred to dermatologist, Dr. Tran Patrick on [facility] for evaluation and follow-up.

## 2022-06-21 NOTE — ASSESSMENT & PLAN NOTE
We discussed eating smaller portions at mealtime. Avoiding snacking and avoiding carbohydrates and sugars in the diet. Increase exercise and physical activity. We will try Orlistat tablet 3 times a day with meals. We discussed possible side effects, mainly diarrhea.

## 2022-06-21 NOTE — PROGRESS NOTES
"Preventative Annual Visit    Laine Hernandez  1970   [id number]    Patient Care Team:  Carina Chan MD as PCP - General (Internal Medicine)    Chief Complaint::   Chief Complaint   Patient presents with   • Annual Exam     Fasting    • Abdominal Pain     Also middle of chest        Subjective   History of Present Illness    Laine Hernandez is a 52 y.o. female who presents today for a yearly physical.    Chest pain  The patient reports that she had chest pain yesterday. She states that she was in bed all day yesterday due to the pain. She states that the pain lasted more than 12 to 16 hours. She states that the pain was constant.  It was worse with exertion.  She states that she took Advil in the morning, but it did not touch it. She states that she took some more Advil in the evening, and that helped. She states that she is not sure what brings it on. She states that when she lays on her right side, it gets worse towards her left side. She states that eating does not make it worse. She states that her upper abdomen was hurting too, but it went away with the morning dose of Advil. She states that she took Pepto-Bismol, and that helped the abdomen.     The patient states that it is not heartburn because she has had that before. She describes today she feels like she has been \"pummeled in her abdomen \".  The upper abdomen feels sore.  She denies tenderness of the chest. She states her stomach was tender. She states that the chest pain and upper abdominal pain do not always go together, but often duavee. She states that she has a lot of nausea with the pain. She states that the nausea is worse after eating. She states that the chest pain is not worse after eating.     The patient denies any diarrhea. She denies any palpitations or shortness of breath. She states that she was sweating, but she figured it was hot flashes. She states that physical activity makes the pain worse. She states that she has never " had a stress test for the heart. She states that she had an EKG when she went to the ER in [place] last August 2021.  She states that the ER doctor told her it was due to anxiety, but she does not feel she has been anxious.  She states that the following month was much more stressful and she did not have any chest pain then.    The patient states that she would like her record not to show she was diagnosed with anxiety. She states it stopped by early 12/2021.  She states that she does not feel anxious. She states that she has never tried Prilosec for the chest pain. She states that she feels a little bit of that chest pain today.  The patient states that her sister and mother both had gallbladder issues.     Tinnitus hearing loss and dizziness   she states that both of her father and older sister have Meniere's disease. She states that sometimes her tinnitus is crazy, and she gets dizzy. She states that it took 3 years to diagnose her sister, and she had all kinds of issues with it. She states that when she has the dizziness, she feels like her hearing decreases in the right ear. She states that she has not seen an ENT for it. She states that it has been the last 6 months since she had COVID-19.  She states that she does not remember if the dizziness is coming from the right ear. She states that she gets really nauseous with it. She states that she does not take anything for the dizziness. She states that she lays down. She states that she tries not to move around.  Moving does make the dizziness worse.    HRT  The patient states that she likes her estradiol patches but since the weather has gotten hot, they have been coming off when she sweats.  So she stopped using them 2 weeks ago the patient states she did not stop taking progesterone 100 mg.  She has had more  hot flashes and night sweats since stopping the estradiol.    Thumb pain  The patient states she developed a knot on her L thumb. She states it is tender  "intermittently. The patient states she has had steroid injections on her R thumb in the past for arthritis pain.    Cough  The patient states she experienced cold symptoms 13 days ago and is still coughing up yellow phlegm. She states she did 2 COVID-19 tests which were negative. The patient denies shortness of breath. She states the cough is annoying when she is trying to work outside.  Working outside makes it worse.  She describes coughing all night with drainage down the back of her throat. She states she rarely takes Benadryl because it \"knocks her out\". She states she has not tried any nasal sprays.  She usually takes generic Allegra for her allergies and it seems to work pretty well.    The patient states it feels like it takes a long time to get over \"stuff\" since she had COVID-19. She describes problems with sense of taste and smell. According to the patient she actually gained weight when she had COVID-19 because she could only taste extreme salt and extreme sugar.  She states that she cannot smell foods but that they smelled different to her not necessarily in a good way.    Allergies  The patient states her allergy symptoms are a runny nose. She states Claritin and Zyrtec cause sleepiness. She takes as needed Allegra which helps.    Exercise  The patient states she walks at least 2 miles a day. She states she does a lot of lifting with gardening. She states she is pretty much on her feet from 9 a.m. to 6 p.m.    Skin condition  Today, the patient states she has had spots on her face on left cheek for 6 months.  They are red and gets scaly sometimes.  She states 4 years ago she was diagnosed with melanoma on the right cheek. She states it was excised 4 years ago.    The patient will re-schedule a colonoscopy.      Weight loss  The patient would like to discuss weight loss medication. She has taken phentermine in the past, and it worked well for her, but she started getting heart palpitations while she was " taking it.  So she stopped it.  She does not want to go back on phentermine.  She asked about orlistat.  She does not weigh herself at home.        CHRONIC CONDITIONS    Patient Active Problem List   Diagnosis   • Dyspareunia due to medical condition in female   • Post-menopausal atrophic vaginitis   • Migraine without aura and without status migrainosus, not intractable   • Primary osteoarthritis of both hands   • Palpitations   • Family history of colon cancer in mother   • Menopausal and postmenopausal disorder   • Precordial pain   • Abnormal glucose   • Sudden idiopathic hearing loss of right ear with unrestricted hearing of left ear   • Dizziness   • Ganglion cyst of flexor tendon sheath of finger of left hand   • Cough in adult   • Class 1 obesity due to excess calories with serious comorbidity and body mass index (BMI) of 31.0 to 31.9 in adult   • Dysgeusia   • Malignant melanoma of face excluding eyelid, nose, lip, and ear (HCC)   • Hypercholesterolemia   • Actinic keratosis of left cheek        Past Medical History:   Diagnosis Date   • Arthritis    • Depression    • GERD (gastroesophageal reflux disease)    • Headache    • Menorrhalgia 2012    uterine ablation performed       Past Surgical History:   Procedure Laterality Date   • BREAST SURGERY  2002    augmentation    • BUNIONECTOMY      2014 and 2015   • SKIN CANCER EXCISION  2018    R cheek melanoma       Family History   Problem Relation Age of Onset   • Arthritis Mother    • Colon cancer Mother 81   • Heart attack Mother 84        cause of death   • COPD Mother    • Arthritis Father    • Diabetes Father         oral med   • Hyperlipidemia Father    • Hypertension Father    • Stroke Father 76   • Obesity Father    • Meniere's disease Father    • Other Sister         vertigo   • Meniere's disease Sister    • Sleep apnea Brother    • Diabetes Paternal Grandfather        Social History     Socioeconomic History   • Marital status:    Tobacco Use    • Smoking status: Never Smoker   • Smokeless tobacco: Never Used   Vaping Use   • Vaping Use: Never used   Substance and Sexual Activity   • Alcohol use: Yes     Comment: 1 aweek   • Drug use: Never   • Sexual activity: Never       Allergies   Allergen Reactions   • Sulfa Antibiotics Other (See Comments)     Per pt - swollen lymph nodes         Current Outpatient Medications:   •  Ascorbic Acid (VITAMIN C PO), Take  by mouth Daily., Disp: , Rfl:   •  Calcium Carb-Cholecalciferol (CALCIUM 500/VITAMIN D PO), Take  by mouth. 2 gummies daily, Disp: , Rfl:   •  diclofenac (VOLTAREN) 50 MG EC tablet, TAKE ONE TABLET BY MOUTH TWICE A DAY AS NEEDED FOR PAIN, Disp: 60 tablet, Rfl: 5  •  Lactobacillus (ACIDOPHILUS PO), Take  by mouth Daily., Disp: , Rfl:   •  magnesium, as, gluconate (MAGONATE) 500 (27 Mg) MG tablet, Take 1 tablet by mouth Daily. (Patient taking differently: Take 27 mg by mouth Daily. Sometimes), Disp: 90 tablet, Rfl: 1  •  multivitamin with minerals tablet tablet, Take 1 tablet by mouth Daily., Disp: , Rfl:   •  Omega-3 Fatty Acids (fish oil) 1000 MG capsule capsule, Take  by mouth Daily. hs, Disp: , Rfl:   •  Progesterone (PROMETRIUM) 100 MG capsule, Take 1 capsule by mouth Daily., Disp: 90 capsule, Rfl: 3  •  Cholecalciferol (VITAMIN D3 PO), Take 1,000 Units by mouth 2 (Two) Times a Day. Not in summer months, Disp: , Rfl:   •  estradiol (Evamist) 1.53 MG/SPRAY transdermal spray, Place 1 spray on the skin as directed by provider Daily., Disp: 1 each, Rfl: 12  •  fexofenadine (ALLEGRA) 180 MG tablet, Take 1 tablet by mouth Daily As Needed (allergies)., Disp: , Rfl:   •  NIACIN ER PO, Take 3,000 Units by mouth Daily. On hold, Disp: , Rfl:   •  omeprazole (priLOSEC) 40 MG capsule, Take 1 capsule by mouth Daily., Disp: 90 capsule, Rfl: 1  •  orlistat (CATHI) 60 MG capsule, Take 1 capsule by mouth 3 (Three) Times a Day With Meals., Disp: 90 capsule, Rfl: 5  •  sodium-potassium-magnesium sulfates (Suprep Bowel  "Prep Kit) 17.5-3.13-1.6 GM/177ML solution oral solution, Take 1 bottle by mouth Take As Directed. Follow instructions that were mailed to your home. If you didn't receive these call [phone]. (Patient taking differently: Take 1 bottle by mouth Take As Directed. Follow instructions that were mailed to your home. If you didn't receive these call [phone].    Needs new referral), Disp: 354 mL, Rfl: 0    Immunization History   Administered Date(s) Administered   • COVID-19 (MODERNA) 1st, 2nd, 3rd Dose Only 03/31/2021, 05/04/2021, 12/23/2021   • Flu Vaccine Quad PF >36MO 09/30/2019   • FluLaval/Fluarix/Fluzone >6 09/29/2021   • Shingrix 12/16/2021   • Tdap 04/26/2019        Health Maintenance Due   Topic Date Due   • MAMMOGRAM  Never done   • COLORECTAL CANCER SCREENING  Never done   • HEPATITIS C SCREENING  Never done   • ZOSTER VACCINE (2 of 2) 02/10/2022   • COVID-19 Vaccine (4 - Booster for Moderna series) 04/23/2022        Review of Systems   Constitutional: Negative for chills, fatigue and fever.   HENT: Negative for congestion, ear pain and sinus pressure.    Respiratory: Negative for shortness of breath and wheezing.    Cardiovascular: Negative for palpitations.   Gastrointestinal: Negative for abdominal pain, blood in stool and constipation.   Skin: Negative for color change.   Allergic/Immunologic: Negative for environmental allergies.   Neurological: Negative for speech difficulty and headache.   Psychiatric/Behavioral: Negative for decreased concentration. The patient is not nervous/anxious.         Vital Signs  Vitals:    06/21/22 1114   BP: 116/84   BP Location: Left arm   Patient Position: Sitting   Cuff Size: Adult   Pulse: 64   Temp: 99.1 °F (37.3 °C)   TempSrc: Temporal   Weight: 86.5 kg (190 lb 9.6 oz)   Height: 167 cm (65.75\")   PainSc:   6   PainLoc: Chest  Comment: middle  jaquelin 4 in abdomen     BMI is >= 30 and <35. (Class 1 Obesity). The following options were offered after discussion;: " weight loss educational material (shared in after visit summary), exercise counseling/recommendations and nutrition counseling/recommendations    Physical Exam  Vitals and nursing note reviewed.   Constitutional:       Appearance: She is well-developed. She is obese.   HENT:      Head: Normocephalic.   Eyes:      Conjunctiva/sclera: Conjunctivae normal.      Pupils: Pupils are equal, round, and reactive to light.   Neck:      Thyroid: No thyromegaly.   Cardiovascular:      Rate and Rhythm: Normal rate and regular rhythm.      Heart sounds: Normal heart sounds.   Pulmonary:      Effort: Pulmonary effort is normal.      Breath sounds: Normal breath sounds. No wheezing.   Chest:      Comments: Bilateral breast implants intact and soft and mobile.  Musculoskeletal:         General: Normal range of motion.      Cervical back: Normal range of motion and neck supple.   Lymphadenopathy:      Cervical: No cervical adenopathy.   Skin:     General: Skin is warm and dry.      Comments: Actinic keratosis on the left cheek. On the right cheek, there is a scar from resection of melanoma.   Neurological:      Mental Status: She is alert and oriented to person, place, and time.   Psychiatric:         Thought Content: Thought content normal.            ECG 12 Lead    Date/Time: 6/22/2022 10:08 AM  Performed by: Carina Chan MD  Authorized by: Carina Chan MD   Comparison: compared with previous ECG   Rhythm: sinus rhythm  Rate: normal  BPM: 66  Conduction: conduction normal  ST Segments: ST segments normal  T Waves: T waves normal  QRS axis: normal    Clinical impression: normal ECG             Fall Risk Screen:  STEADI Fall Risk Assessment has not been completed.    Health Habits and Functional and Cognitive Screening:  No flowsheet data found.    Smoking Status:  Social History     Tobacco Use   Smoking Status Never Smoker   Smokeless Tobacco Never Used       Alcohol Consumption:  Social History     Substance and Sexual  Activity   Alcohol Use Yes    Comment: 1 aweek       Depression [place]  PHQ-9:    PHQ-2/PHQ-9 Depression Screening 6/21/2022   Retired PHQ-9 Total Score -   Retired Total Score -   Little Interest or Pleasure in Doing Things 0-->not at all   Feeling Down, Depressed or Hopeless 0-->not at all   PHQ-9: Brief Depression Severity Measure Score 0          ACE III MINI        Labs  Results for orders placed or performed in visit on 06/16/22   Comprehensive Metabolic Panel    Specimen: Blood   Result Value Ref Range    Glucose 86 65 - 99 mg/dL    BUN 14 6 - 20 mg/dL    Creatinine 0.76 0.57 - 1.00 mg/dL    Sodium 137 136 - 145 mmol/L    Potassium 4.3 3.5 - 5.2 mmol/L    Chloride 100 98 - 107 mmol/L    CO2 24.0 22.0 - 29.0 mmol/L    Calcium 9.6 8.6 - 10.5 mg/dL    Total Protein 7.5 6.0 - 8.5 g/dL    Albumin 4.70 3.50 - 5.20 g/dL    ALT (SGPT) 18 1 - 33 U/L    AST (SGOT) 19 1 - 32 U/L    Alkaline Phosphatase 83 39 - 117 U/L    Total Bilirubin 0.5 0.0 - 1.2 mg/dL    Globulin 2.8 gm/dL    A/G Ratio 1.7 g/dL    BUN/Creatinine Ratio 18.4 7.0 - 25.0    Anion Gap 13.0 5.0 - 15.0 mmol/L    eGFR 94.4 >60.0 mL/min/1.73   Lipid Panel    Specimen: Blood   Result Value Ref Range    Total Cholesterol 202 (H) 0 - 200 mg/dL    Triglycerides 137 0 - 150 mg/dL    HDL Cholesterol 56 40 - 60 mg/dL    LDL Cholesterol  122 (H) 0 - 100 mg/dL    VLDL Cholesterol 24 5 - 40 mg/dL    LDL/HDL Ratio 2.12    Hemoglobin A1c    Specimen: Blood   Result Value Ref Range    Hemoglobin A1C 5.60 4.80 - 5.60 %   TSH    Specimen: Blood   Result Value Ref Range    TSH 0.722 0.270 - 4.200 uIU/mL   Vitamin D 25 Hydroxy    Specimen: Blood   Result Value Ref Range    25 Hydroxy, Vitamin D 42.3 30.0 - 100.0 ng/ml   CBC Auto Differential    Specimen: Blood   Result Value Ref Range    WBC 6.39 3.40 - 10.80 10*3/mm3    RBC 4.26 3.77 - 5.28 10*6/mm3    Hemoglobin 13.9 12.0 - 15.9 g/dL    Hematocrit 40.2 34.0 - 46.6 %    MCV 94.4 79.0 - 97.0 fL    MCH 32.6 26.6 - 33.0 pg     MCHC 34.6 31.5 - 35.7 g/dL    RDW 11.7 (L) 12.3 - 15.4 %    RDW-SD 39.2 37.0 - 54.0 fl    MPV 10.7 6.0 - 12.0 fL    Platelets 271 140 - 450 10*3/mm3    Neutrophil % 60.8 42.7 - 76.0 %    Lymphocyte % 29.7 19.6 - 45.3 %    Monocyte % 7.5 5.0 - 12.0 %    Eosinophil % 1.4 0.3 - 6.2 %    Basophil % 0.3 0.0 - 1.5 %    Immature Grans % 0.3 0.0 - 0.5 %    Neutrophils, Absolute 3.88 1.70 - 7.00 10*3/mm3    Lymphocytes, Absolute 1.90 0.70 - 3.10 10*3/mm3    Monocytes, Absolute 0.48 0.10 - 0.90 10*3/mm3    Eosinophils, Absolute 0.09 0.00 - 0.40 10*3/mm3    Basophils, Absolute 0.02 0.00 - 0.20 10*3/mm3    Immature Grans, Absolute 0.02 0.00 - 0.05 10*3/mm3    nRBC 0.0 0.0 - 0.2 /100 WBC   Urinalysis without microscopic (no culture) - Urine, Clean Catch    Specimen: Urine, Clean Catch   Result Value Ref Range    Color, UA Yellow Yellow, Straw    Appearance, UA Clear Clear    pH, UA 7.0 5.0 - 8.0    Specific Gravity, UA 1.019 1.005 - 1.030    Glucose, UA Negative Negative    Ketones, UA Negative Negative    Bilirubin, UA Negative Negative    Blood, UA Negative Negative    Protein, UA Negative Negative    Leuk Esterase, UA Small (1+) (A) Negative    Nitrite, UA Negative Negative    Urobilinogen, UA 0.2 E.U./dL 0.2 - 1.0 E.U./dL   Urinalysis, Microscopic Only - Urine, Clean Catch    Specimen: Urine, Clean Catch   Result Value Ref Range    RBC, UA None Seen None Seen, 0-2 /HPF    WBC, UA 3-5 (A) None Seen, 0-2 /HPF    Bacteria, UA Trace (A) None Seen /HPF    Squamous Epithelial Cells, UA 3-6 (A) None Seen, 0-2 /HPF    Hyaline Casts, UA None Seen None Seen /LPF    Methodology Manual Light Microscopy         Assessment & Plan     Patient Self-Management and Personalized Health Advice    Patient Instructions   Problem List Items Addressed This Visit        Cardiac and Vasculature    Hypercholesterolemia (Chronic)    Current Assessment & Plan     LDL is elevated at 122  mg/dL. She will increase exercise and physical activity.  Decrease fats and carbs and sugars in the diet. Work on weight loss.           Relevant Orders    Ambulatory Referral to Cardiology    Precordial pain    Overview     Mid-chest pain accompanied by upper abdominal pain and nausea. No palpitations or shortness of breath. Worse with activity.           Current Assessment & Plan     Referral for echocardiogram. Refer to cardiologist. Start taking omeprazole daily to decrease acid reflux. Patient advised to go to the ER if chest pain is worse.           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 Accompanied by dizziness and increased tinnitus and nausea.           Current Assessment & Plan     Refer to ENT.           Relevant Orders    Ambulatory Referral to ENT (Otolaryngology) (Completed)       Endocrine and Metabolic    Class 1 obesity due to excess calories with serious comorbidity and body mass index (BMI) of 31.0 to 31.9 in adult (Chronic)    Current Assessment & Plan     We discussed eating smaller portions at mealtime. Avoiding snacking and avoiding carbohydrates and sugars in the diet. Increase exercise and physical activity. We will try Orlistat tablet 3 times a day with meals. We discussed possible side effects, mainly diarrhea.           Relevant Orders    Ambulatory Referral to Cardiology       Genitourinary and Reproductive     Menopausal and postmenopausal disorder (Chronic)    Overview     Since starting HRT in October 2021, patient feels much better.  She is having less palpitations, less mood irritability, less anxious, sleeping better, less vaginal dryness, less migraines.             Current Assessment & Plan     Since the estradiol patch is difficult to keep on, we will try Evamist spray on the inner forearm once a day. She will  continue taking progesterone 100 mg tablet every evening.              Hematology and Neoplasia    Malignant melanoma of face excluding eyelid, nose, lip, and ear (HCC) (Chronic)    Overview     R cheek excision 2018.           Current Assessment & Plan     She is being referred to dermatologist, Dr. Tran Patrick on [facility] for evaluation and follow-up.             Relevant Orders    Ambulatory Referral to Dermatology (Completed)       Musculoskeletal and Injuries    Primary osteoarthritis of both hands (Chronic)    Overview     CMC osteoarthritis with flexor tendon cyst on L thumb.           Current Assessment & Plan     She may continue taking diclofenac with food as needed. May also try Tylenol or over-the-counter arthritis creams.           Ganglion cyst of flexor tendon sheath of finger of left hand (Chronic)    Overview     See above              Neuro    Dysgeusia (Chronic)    Overview     Altered taste and smell since COVID infection.              Pulmonary and Pneumonias    Cough in adult (Chronic)    Current Assessment & Plan     Patient did do 2 COVID-19 test which were negative. Cough with drainage is improving. She will continue using Allegra allergy tablet.              Skin    Actinic keratosis of left cheek (Chronic)    Current Assessment & Plan     Refer to Dr. Tran Patrick. Practice good sun avoidance using sunscreen and a hat when working outside.              Symptoms and Signs    Dizziness (Chronic)    Overview     Episodic with hearing loss in R ear.           Current Assessment & Plan     Refer to ENT.           Relevant Orders    Ambulatory Referral to ENT (Otolaryngology) (Completed)      Other Visit Diagnoses     Annual physical exam    -  Primary    Need for vaccination                 Diagnosis Plan   1. Annual physical exam     2. Precordial pain  Adult Transthoracic Echo Complete W/ Cont if Necessary Per Protocol    Ambulatory Referral to Cardiology   3. Sudden idiopathic hearing  loss of right ear with unrestricted hearing of left ear  Ambulatory Referral to ENT (Otolaryngology)   4. Dizziness  Ambulatory Referral to ENT (Otolaryngology)   5. Hypercholesterolemia  Ambulatory Referral to Cardiology   6. Class 1 obesity due to excess calories with serious comorbidity and body mass index (BMI) of 31.0 to 31.9 in adult  Ambulatory Referral to Cardiology   7. Primary osteoarthritis of both hands     8. Ganglion cyst of flexor tendon sheath of finger of left hand     9. Malignant melanoma of face excluding eyelid, nose, lip, and ear (HCC)  Ambulatory Referral to Dermatology   10. Actinic keratosis of left cheek     11. Cough in adult     12. Dysgeusia     13. Menopausal and postmenopausal disorder     14. Need for vaccination               Outpatient Encounter Medications as of 6/21/2022   Medication Sig Dispense Refill   • Ascorbic Acid (VITAMIN C PO) Take  by mouth Daily.     • Calcium Carb-Cholecalciferol (CALCIUM 500/VITAMIN D PO) Take  by mouth. 2 gummies daily     • diclofenac (VOLTAREN) 50 MG EC tablet TAKE ONE TABLET BY MOUTH TWICE A DAY AS NEEDED FOR PAIN 60 tablet 5   • Lactobacillus (ACIDOPHILUS PO) Take  by mouth Daily.     • magnesium, as, gluconate (MAGONATE) 500 (27 Mg) MG tablet Take 1 tablet by mouth Daily. (Patient taking differently: Take 27 mg by mouth Daily. Sometimes) 90 tablet 1   • multivitamin with minerals tablet tablet Take 1 tablet by mouth Daily.     • Omega-3 Fatty Acids (fish oil) 1000 MG capsule capsule Take  by mouth Daily. hs     • Progesterone (PROMETRIUM) 100 MG capsule Take 1 capsule by mouth Daily. 90 capsule 3   • [DISCONTINUED] estradiol (Climara) 0.025 MG/24HR patch Place 1 patch on the skin as directed by provider 1 (One) Time Per Week. 12 each 3   • Cholecalciferol (VITAMIN D3 PO) Take 1,000 Units by mouth 2 (Two) Times a Day. Not in summer months     • estradiol (Evamist) 1.53 MG/SPRAY transdermal spray Place 1 spray on the skin as directed by provider  Daily. 1 each 12   • fexofenadine (ALLEGRA) 180 MG tablet Take 1 tablet by mouth Daily As Needed (allergies).     • NIACIN ER PO Take 3,000 Units by mouth Daily. On hold     • omeprazole (priLOSEC) 40 MG capsule Take 1 capsule by mouth Daily. 90 capsule 1   • orlistat (CATHI) 60 MG capsule Take 1 capsule by mouth 3 (Three) Times a Day With Meals. 90 capsule 5   • sodium-potassium-magnesium sulfates (Suprep Bowel Prep Kit) 17.5-3.13-1.6 GM/177ML solution oral solution Take 1 bottle by mouth Take As Directed. Follow instructions that were mailed to your home. If you didn't receive these call [phone]. (Patient taking differently: Take 1 bottle by mouth Take As Directed. Follow instructions that were mailed to your home. If you didn't receive these call [phone].    Needs new referral) 354 mL 0   • [DISCONTINUED] Coenzyme Q10 (CO Q 10 PO) Take  by mouth Daily.     • [DISCONTINUED] silver sulfadiazine (Silvadene) 1 % cream Apply 1 application topically to the appropriate area as directed 2 (Two) Times a Day. 25 g 1     No facility-administered encounter medications on file as of 6/21/2022.     Age appropriate preventive counseling done including age appropriate vaccines,regular  Mammogram and self breast exam, pap smear, colonoscopy, regular dental visits, mental health, injury prevention such as wearing seat belt and preventing falls, healthy  nutrition, healthy weight, regular physical exercise. Alcohol use is moderate.  Tobacco history-none. Drug use-none.  STD's-not at risk.    Follow Up:  Return in about 6 months (around 12/21/2022) for recheck fasting.     I spent 85 minutes caring for Laine on this date of service including taking a history, discussing her symptoms and probable diagnoses and various treatments, ordering test, ordering referrals, doing a physical exam, and documenting the chart.    An After Visit Summary and PPPS with all of these plans were given to the patient.      Note: Part of this  note may be an electronic transcription/translation of spoken language to printed text using the Dragon Dictation System.     Transcribed from ambient dictation for Carina Chan MD by DAREN GARCIA.  06/21/22   17:54 EDT    Patient verbalized consent to the visit recording.

## 2022-06-21 NOTE — ASSESSMENT & PLAN NOTE
Refer to Dr. Tran Patrick. Practice good sun avoidance using sunscreen and a hat when working outside.

## 2022-06-21 NOTE — ASSESSMENT & PLAN NOTE
Patient did do 2 COVID-19 test which were negative. Cough with drainage is improving. She will continue using Allegra allergy tablet.

## 2022-06-21 NOTE — ASSESSMENT & PLAN NOTE
She may continue taking diclofenac with food as needed. May also try Tylenol or over-the-counter arthritis creams.

## 2022-06-21 NOTE — ASSESSMENT & PLAN NOTE
Since the estradiol patch is difficult to keep on, we will try Evamist spray on the inner forearm once a day. She will continue taking progesterone 100 mg tablet every evening.

## 2022-06-21 NOTE — ASSESSMENT & PLAN NOTE
Referral for echocardiogram. Refer to cardiologist. Start taking omeprazole daily to decrease acid reflux. Patient advised to go to the ER if chest pain is worse.

## 2022-06-21 NOTE — ASSESSMENT & PLAN NOTE
LDL is elevated at 122  mg/dL. She will increase exercise and physical activity. Decrease fats and carbs and sugars in the diet. Work on weight loss.

## 2022-06-22 PROCEDURE — 93000 ELECTROCARDIOGRAM COMPLETE: CPT | Performed by: INTERNAL MEDICINE

## 2022-06-22 NOTE — PATIENT INSTRUCTIONS
Patient Instructions   Problem List Items Addressed This Visit          Cardiac and Vasculature    Hypercholesterolemia (Chronic)    Current Assessment & Plan     LDL is elevated at 122  mg/dL. She will increase exercise and physical activity. Decrease fats and carbs and sugars in the diet. Work on weight loss.           Relevant Orders    Ambulatory Referral to Cardiology    Precordial pain    Overview     Mid-chest pain accompanied by upper abdominal pain and nausea. No palpitations or shortness of breath. Worse with activity.           Current Assessment & Plan     Referral for echocardiogram. Refer to cardiologist. Start taking omeprazole daily to decrease acid reflux. Patient advised to go to the ER if chest pain is worse.           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 Accompanied by dizziness and increased tinnitus and nausea.           Current Assessment & Plan     Refer to ENT.           Relevant Orders    Ambulatory Referral to ENT (Otolaryngology) (Completed)       Endocrine and Metabolic    Class 1 obesity due to excess calories with serious comorbidity and body mass index (BMI) of 31.0 to 31.9 in adult (Chronic)    Current Assessment & Plan     We discussed eating smaller portions at mealtime. Avoiding snacking and avoiding carbohydrates and sugars in the diet. Increase exercise and physical activity. We will try Orlistat tablet 3 times a day with meals. We discussed possible side effects, mainly diarrhea.           Relevant Orders    Ambulatory Referral to Cardiology       Genitourinary and Reproductive     Menopausal and postmenopausal disorder (Chronic)    Overview     Since starting HRT in October 2021, patient feels much better.  She is having less palpitations, less mood  irritability, less anxious, sleeping better, less vaginal dryness, less migraines.             Current Assessment & Plan     Since the estradiol patch is difficult to keep on, we will try Evamist spray on the inner forearm once a day. She will continue taking progesterone 100 mg tablet every evening.              Hematology and Neoplasia    Malignant melanoma of face excluding eyelid, nose, lip, and ear (HCC) (Chronic)    Overview     R cheek excision 2018.           Current Assessment & Plan     She is being referred to dermatologist, Dr. Tran Patrick on [facility] for evaluation and follow-up.             Relevant Orders    Ambulatory Referral to Dermatology (Completed)       Musculoskeletal and Injuries    Primary osteoarthritis of both hands (Chronic)    Overview     CMC osteoarthritis with flexor tendon cyst on L thumb.           Current Assessment & Plan     She may continue taking diclofenac with food as needed. May also try Tylenol or over-the-counter arthritis creams.           Ganglion cyst of flexor tendon sheath of finger of left hand (Chronic)    Overview     See above              Neuro    Dysgeusia (Chronic)    Overview     Altered taste and smell since COVID infection.              Pulmonary and Pneumonias    Cough in adult (Chronic)    Current Assessment & Plan     Patient did do 2 COVID-19 test which were negative. Cough with drainage is improving. She will continue using Allegra allergy tablet.              Skin    Actinic keratosis of left cheek (Chronic)    Current Assessment & Plan     Refer to Dr. Tran Patrick. Practice good sun avoidance using sunscreen and a hat when working outside.              Symptoms and Signs    Dizziness (Chronic)    Overview     Episodic with hearing loss in R ear.           Current Assessment & Plan     Refer to ENT.           Relevant Orders    Ambulatory Referral to ENT (Otolaryngology) (Completed)          Other Visit Diagnoses       Annual physical exam    -   "Primary    Need for vaccination        Relevant Orders    Shingrix Vaccine          BMI for Adults  What is BMI?  Body mass index (BMI) is a number that is calculated from a person's weight and height. BMI can help estimate how much of a person's weight is composed of fat. BMI does not measure body fat directly. Rather, it is an alternative to procedures that directly measure body fat, which can be difficult and expensive.  BMI can help identify people who may be at higher risk for certain medical problems.  What are BMI measurements used for?  BMI is used as a screening tool to identify possible weight problems. It helps determine whether a person is obese, overweight, a healthy weight, or underweight.  BMI is useful for:  Identifying a weight problem that may be related to a medical condition or may increase the risk for medical problems.  Promoting changes, such as changes in diet and exercise, to help reach a healthy weight. BMI screening can be repeated to see if these changes are working.  How is BMI calculated?  BMI involves measuring your weight in relation to your height. Both height and weight are measured, and the BMI is calculated from those numbers. This can be done either in English ([place] or metric measurements. Note that charts and online BMI calculators are available to help you find your BMI quickly and easily without having to do these calculations yourself.  To calculate your BMI in English ([place] measurements:    Measure your weight in pounds (lb).  Multiply the number of pounds by 703.  For example, for a person who weighs 180 lb, multiply that number by 703, which equals 126,540.  Measure your height in inches. Then multiply that number by itself to get a measurement called \"inches squared.\"  For example, for a person who is 70 inches tall, the \"inches squared\" measurement is 70 inches x 70 inches, which equals 4,900 inches squared.  Divide the total from step 2 (number of lb x 703) by the " "total from step 3 (inches squared): 126,540 ÷ 4,900 = 25.8. This is your BMI.    To calculate your BMI in metric measurements:  Measure your weight in kilograms (kg).  Measure your height in meters (m). Then multiply that number by itself to get a measurement called \"meters squared.\"  For example, for a person who is 1.75 m tall, the \"meters squared\" measurement is 1.75 m x 1.75 m, which is equal to 3.1 meters squared.  Divide the number of kilograms (your weight) by the meters squared number. In this example: 70 ÷ 3.1 = 22.6. This is your BMI.  What do the results mean?  BMI charts are used to identify whether you are underweight, normal weight, overweight, or obese. The following guidelines will be used:  Underweight: BMI less than 18.5.  Normal weight: BMI between 18.5 and 24.9.  Overweight: BMI between 25 and 29.9.  Obese: BMI of 30 or above.  Keep these notes in mind:  Weight includes both fat and muscle, so someone with a muscular build, such as an athlete, may have a BMI that is higher than 24.9. In cases like these, BMI is not an accurate measure of body fat.  To determine if excess body fat is the cause of a BMI of 25 or higher, further assessments may need to be done by a health care provider.  BMI is usually interpreted in the same way for men and women.  Where to find more information  For more information about BMI, including tools to quickly calculate your BMI, go to these websites:  Centers for Disease Control and Prevention: www.cdc.gov  American Heart Association: www.heart.org  National Heart, Lung, and Blood [place]: www.nhlbi.nih.gov  Summary  Body mass index (BMI) is a number that is calculated from a person's weight and height.  BMI may help estimate how much of a person's weight is composed of fat. BMI can help identify those who may be at higher risk for certain medical problems.  BMI can be measured using English measurements or metric measurements.  BMI charts are used to identify whether " you are underweight, normal weight, overweight, or obese.  This information is not intended to replace advice given to you by your health care provider. Make sure you discuss any questions you have with your health care provider.  Document Revised: 09/09/2020 Document Reviewed: 07/17/2020  Elsevier Patient Education © 2021 [employer]  Calorie Counting for Weight Loss  Calories are units of energy. Your body needs a certain number of calories from food to keep going throughout the day. When you eat or drink more calories than your body needs, your body stores the extra calories mostly as fat. When you eat or drink fewer calories than your body needs, your body burns fat to get the energy it needs.  Calorie counting means keeping track of how many calories you eat and drink each day. Calorie counting can be helpful if you need to lose weight. If you eat fewer calories than your body needs, you should lose weight. Ask your health care provider what a healthy weight is for you.  For calorie counting to work, you will need to eat the right number of calories each day to lose a healthy amount of weight per week. A dietitian can help you figure out how many calories you need in a day and will suggest ways to reach your calorie goal.  A healthy amount of weight to lose each week is usually 1-2 lb (0.5-0.9 kg). This usually means that your daily calorie intake should be reduced by 500-750 calories.  Eating 1,200-1,500 calories a day can help most women lose weight.  Eating 1,500-1,800 calories a day can help most men lose weight.  What do I need to know about calorie counting?  Work with your health care provider or dietitian to determine how many calories you should get each day. To meet your daily calorie goal, you will need to:  Find out how many calories are in each food that you would like to eat. Try to do this before you eat.  Decide how much of the food you plan to eat.  Keep a food log. Do this by writing down  what you ate and how many calories it had.  To successfully lose weight, it is important to balance calorie counting with a healthy lifestyle that includes regular activity.  Where do I find calorie information?    The number of calories in a food can be found on a Nutrition Facts label. If a food does not have a Nutrition Facts label, try to look up the calories online or ask your dietitian for help.  Remember that calories are listed per serving. If you choose to have more than one serving of a food, you will have to multiply the calories per serving by the number of servings you plan to eat. For example, the label on a package of bread might say that a serving size is 1 slice and that there are 90 calories in a serving. If you eat 1 slice, you will have eaten 90 calories. If you eat 2 slices, you will have eaten 180 calories.  How do I keep a food log?  After each time that you eat, record the following in your food log as soon as possible:  What you ate. Be sure to include toppings, sauces, and other extras on the food.  How much you ate. This can be measured in cups, ounces, or number of items.  How many calories were in each food and drink.  The total number of calories in the food you ate.  Keep your food log near you, such as in a pocket-sized notebook or on an mendel or website on your mobile phone. Some programs will calculate calories for you and show you how many calories you have left to meet your daily goal.  What are some portion-control tips?  Know how many calories are in a serving. This will help you know how many servings you can have of a certain food.  Use a measuring cup to measure serving sizes. You could also try weighing out portions on a kitchen scale. With time, you will be able to estimate serving sizes for some foods.  Take time to put servings of different foods on your favorite plates or in your favorite bowls and cups so you know what a serving looks like.  Try not to eat straight from a  food's packaging, such as from a bag or box. Eating straight from the package makes it hard to see how much you are eating and can lead to overeating. Put the amount you would like to eat in a cup or on a plate to make sure you are eating the right portion.  Use smaller plates, glasses, and bowls for smaller portions and to prevent overeating.  Try not to multitask. For example, avoid watching TV or using your computer while eating. If it is time to eat, sit down at a table and enjoy your food. This will help you recognize when you are full. It will also help you be more mindful of what and how much you are eating.  What are tips for following this plan?  Reading food labels  Check the calorie count compared with the serving size. The serving size may be smaller than what you are used to eating.  Check the source of the calories. Try to choose foods that are high in protein, fiber, and vitamins, and low in saturated fat, trans fat, and sodium.  Shopping  Read nutrition labels while you shop. This will help you make healthy decisions about which foods to buy.  Pay attention to nutrition labels for low-fat or fat-free foods. These foods sometimes have the same number of calories or more calories than the full-fat versions. They also often have added sugar, starch, or salt to make up for flavor that was removed with the fat.  Make a grocery list of lower-calorie foods and stick to it.  Cooking  Try to cook your favorite foods in a healthier way. For example, try baking instead of frying.  Use low-fat dairy products.  Meal planning  Use more fruits and vegetables. One-half of your plate should be fruits and vegetables.  Include lean proteins, such as chicken, turkey, and fish.  Lifestyle  Each week, aim to do one of the followin minutes of moderate exercise, such as walking.  75 minutes of vigorous exercise, such as running.  General information  Know how many calories are in the foods you eat most often. This  will help you calculate calorie counts faster.  Find a way of tracking calories that works for you. Get creative. Try different apps or programs if writing down calories does not work for you.  What foods should I eat?    Eat nutritious foods. It is better to have a nutritious, high-calorie food, such as an avocado, than a food with few nutrients, such as a bag of potato chips.  Use your calories on foods and drinks that will fill you up and will not leave you hungry soon after eating.  Examples of foods that fill you up are nuts and nut butters, vegetables, lean proteins, and high-fiber foods such as whole grains. High-fiber foods are foods with more than 5 g of fiber per serving.  Pay attention to calories in drinks. Low-calorie drinks include water and unsweetened drinks.  The items listed above may not be a complete list of foods and beverages you can eat. Contact a dietitian for more information.  What foods should I limit?  Limit foods or drinks that are not good sources of vitamins, minerals, or protein or that are high in unhealthy fats. These include:  Candy.  Other sweets.  Sodas, specialty coffee drinks, alcohol, and juice.  The items listed above may not be a complete list of foods and beverages you should avoid. Contact a dietitian for more information.  How do I count calories when eating out?  Pay attention to portions. Often, portions are much larger when eating out. Try these tips to keep portions smaller:  Consider sharing a meal instead of getting your own.  If you get your own meal, eat only half of it. Before you start eating, ask for a container and put half of your meal into it.  When available, consider ordering smaller portions from the menu instead of full portions.  Pay attention to your food and drink choices. Knowing the way food is cooked and what is included with the meal can help you eat fewer calories.  If calories are listed on the menu, choose the lower-calorie options.  Choose  dishes that include vegetables, fruits, whole grains, low-fat dairy products, and lean proteins.  Choose items that are boiled, broiled, grilled, or steamed. Avoid items that are buttered, battered, fried, or served with cream sauce. Items labeled as crispy are usually fried, unless stated otherwise.  Choose water, low-fat milk, unsweetened iced tea, or other drinks without added sugar. If you want an alcoholic beverage, choose a lower-calorie option, such as a glass of wine or light beer.  Ask for dressings, sauces, and syrups on the side. These are usually high in calories, so you should limit the amount you eat.  If you want a salad, choose a garden salad and ask for grilled meats. Avoid extra toppings such as izquierdo, cheese, or fried items. Ask for the dressing on the side, or ask for olive oil and vinegar or lemon to use as dressing.  Estimate how many servings of a food you are given. Knowing serving sizes will help you be aware of how much food you are eating at restaurants.  Where to find more information  Centers for Disease Control and Prevention: www.cdc.gov  [place]. Department of Agriculture: myplate.gov  Summary  Calorie counting means keeping track of how many calories you eat and drink each day. If you eat fewer calories than your body needs, you should lose weight.  A healthy amount of weight to lose per week is usually 1-2 lb (0.5-0.9 kg). This usually means reducing your daily calorie intake by 500-750 calories.  The number of calories in a food can be found on a Nutrition Facts label. If a food does not have a Nutrition Facts label, try to look up the calories online or ask your dietitian for help.  Use smaller plates, glasses, and bowls for smaller portions and to prevent overeating.  Use your calories on foods and drinks that will fill you up and not leave you hungry shortly after a meal.  This information is not intended to replace advice given to you by your health care provider. Make sure you  discuss any questions you have with your health care provider.  Document Revised: 01/28/2021 Document Reviewed: 01/28/2021  Elsevier Patient Education © 2021 [employer]

## 2022-06-29 DIAGNOSIS — E66.09 CLASS 1 OBESITY DUE TO EXCESS CALORIES WITH SERIOUS COMORBIDITY AND BODY MASS INDEX (BMI) OF 31.0 TO 31.9 IN ADULT: Primary | ICD-10-CM

## 2022-06-29 RX ORDER — CARBOXYMETHYLCELLULOSE/CITRIC 0.75 G
3 CAPSULE ORAL
Qty: 60 CAPSULE | Refills: 5 | Status: SHIPPED | OUTPATIENT
Start: 2022-06-29 | End: 2022-08-23

## 2022-07-27 DIAGNOSIS — E66.09 CLASS 1 OBESITY DUE TO EXCESS CALORIES WITH SERIOUS COMORBIDITY AND BODY MASS INDEX (BMI) OF 31.0 TO 31.9 IN ADULT: Primary | ICD-10-CM

## 2022-07-27 RX ORDER — PHENTERMINE HYDROCHLORIDE 37.5 MG/1
37.5 CAPSULE ORAL EVERY MORNING
Qty: 30 CAPSULE | Refills: 2 | Status: SHIPPED | OUTPATIENT
Start: 2022-07-27 | End: 2022-11-14 | Stop reason: SDUPTHER

## 2022-07-27 RX ORDER — METFORMIN HYDROCHLORIDE 500 MG/1
500 TABLET, EXTENDED RELEASE ORAL
Qty: 90 TABLET | Refills: 1 | Status: SHIPPED | OUTPATIENT
Start: 2022-07-27 | End: 2022-11-14 | Stop reason: SDUPTHER

## 2022-08-22 NOTE — PROGRESS NOTES
[facility] Cardiology   Consult  Laine Hernandez  1970    VISIT DATE:  08/23/22    PCP:   Carina Chan MD  [address] [address]        CC:  Chest Pain      Problem List:  1.  Prior melanoma status postresection  2.  On hormone replacement postmenopausal   3.  GERD    Echo June 2022:  · Estimated left ventricular EF = 65% Left ventricular ejection fraction appears to be 61 - 65%. Left ventricular systolic function is normal.  · Left ventricular diastolic dysfunction is noted.       History of Present Illness:  Laine Hernandez  Is a 52 y.o. female with pertinent cardiac history detailed above.  Patient referred by Dr. Chan for evaluation of chest pain.  She had described as constant and worse with exertion.  EKG from 6/28/2022 normal no ischemia.  She describes three episodes between january and April.  She had associated nausea She was started on PPI with improvement in symptoms.  She described the episodes as lasting a few hours at a time.  Seemed unrelated to food intake.  [place] walking 2 miles a day without precipitation of pain or symptoms.  She does not have history of hypertension.  Lipids fairly well controlled currently.  Since April she has not had any further concerns.  Last year she had a period of 6 months of intermittent palpitations after receiving her second COVID shot but this is also resolved..        Patient Active Problem List    Diagnosis Date Noted   • Sudden idiopathic hearing loss of right ear with unrestricted hearing of left ear 06/21/2022     Note Last Updated: 6/21/2022     Episodic. Accompanied by dizziness and increased tinnitus and nausea.     • Dizziness 06/21/2022     Note Last Updated: 6/21/2022     Episodic with hearing loss in R ear.     • Ganglion cyst of flexor tendon sheath of finger of left hand 06/21/2022     Note Last Updated: 6/21/2022     See above     • Cough in adult 06/21/2022   •  Class 1 obesity due to excess calories with serious comorbidity and body mass index (BMI) of 31.0 to 31.9 in adult 06/21/2022   • Dysgeusia 06/21/2022     Note Last Updated: 6/21/2022     Altered taste and smell since COVID infection.     • Malignant melanoma of face excluding eyelid, nose, lip, and ear (HCC) 06/21/2022     Note Last Updated: 6/21/2022     R cheek excision 2018.     • Hypercholesterolemia 06/21/2022   • Actinic keratosis of left cheek 06/21/2022   • Precordial pain 06/15/2022     Note Last Updated: 6/21/2022     Mid-chest pain accompanied by upper abdominal pain and nausea. No palpitations or shortness of breath. Worse with activity.     • Abnormal glucose 06/15/2022   • Menopausal and postmenopausal disorder 12/16/2021     Note Last Updated: 6/21/2022     Since starting HRT in October 2021, patient feels much better.  She is having less palpitations, less mood irritability, less anxious, sleeping better, less vaginal dryness, less migraines.       • Family history of colon cancer in mother 10/10/2021     Note Last Updated: 10/10/2021     Mother diagnosed with colon cancer at 81y.     • Dyspareunia due to medical condition in female 10/04/2021     Note Last Updated: 10/10/2021     See above.     • Post-menopausal atrophic vaginitis 10/04/2021     Note Last Updated: 12/16/2021     Continue estradiol patch weekly and progesterone tablet every evening.    She will try Replens vaginal moisturizer.  We also discussed the possibility of compounding a coconut oil vaginal cream.     • Migraine without aura and without status migrainosus, not intractable 10/04/2021     Note Last Updated: 10/10/2021     Migraine symptoms include nausea, fatigue, general arthralgias and myalgias, and light sensitivity.  She has headache pain with the symptoms about 50% of the time.    May take diclofenac with food twice a day as needed.    Track headaches on the calendar and watch for triggers.     • Primary osteoarthritis of  both hands 10/04/2021     Note Last Updated: 6/21/2022     CMC osteoarthritis with flexor tendon cyst on L thumb.     • Palpitations 10/04/2021     Note Last Updated: 12/16/2021     Palpitations have improved.  May be alcohol or sugar related.  HRT may have played a role in improving the palpitations.      She will continue to avoid excessive caffeine and chocolate in the diet.           Allergies   Allergen Reactions   • Sulfa Antibiotics Other (See Comments)     Per pt - swollen lymph nodes       Social History     Socioeconomic History   • Marital status:    Tobacco Use   • Smoking status: Never Smoker   • Smokeless tobacco: Never Used   Vaping Use   • Vaping Use: Never used   Substance and Sexual Activity   • Alcohol use: Yes     Comment: 1 aweek   • Drug use: Never   • Sexual activity: Never       Family History   Problem Relation Age of Onset   • Arthritis Mother    • Colon cancer Mother 81   • Heart attack Mother 84        cause of death   • COPD Mother    • Arthritis Father    • Diabetes Father         oral med   • Hyperlipidemia Father    • Hypertension Father    • Stroke Father 76   • Obesity Father    • Meniere's disease Father    • Other Sister         vertigo   • Meniere's disease Sister    • Sleep apnea Brother    • Diabetes Paternal Grandfather        Current Medications:    Current Outpatient Medications:   •  Ascorbic Acid (VITAMIN C PO), Take  by mouth Daily., Disp: , Rfl:   •  Calcium Carb-Cholecalciferol (CALCIUM 500/VITAMIN D PO), Take  by mouth. 2 gummies daily, Disp: , Rfl:   •  Cholecalciferol (VITAMIN D3 PO), Take 1,000 Units by mouth 2 (Two) Times a Day. Not in summer months, Disp: , Rfl:   •  diclofenac (VOLTAREN) 50 MG EC tablet, TAKE ONE TABLET BY MOUTH TWICE A DAY AS NEEDED FOR PAIN, Disp: 60 tablet, Rfl: 5  •  estradiol (CLIMARA) 0.025 MG/24HR patch, 1 (One) Time Per Week., Disp: , Rfl:   •  fexofenadine (ALLEGRA) 180 MG tablet, Take 1 tablet by mouth Daily As Needed  "(allergies)., Disp: , Rfl:   •  Lactobacillus (ACIDOPHILUS PO), Take  by mouth Daily., Disp: , Rfl:   •  magnesium, as, gluconate (MAGONATE) 500 (27 Mg) MG tablet, Take 1 tablet by mouth Daily. (Patient taking differently: Take 27 mg by mouth Daily. Sometimes), Disp: 90 tablet, Rfl: 1  •  meclizine (ANTIVERT) 12.5 MG tablet, As Needed., Disp: , Rfl:   •  metFORMIN ER (GLUCOPHAGE-XR) 500 MG 24 hr tablet, Take 1 tablet by mouth Daily With Breakfast. (Patient taking differently: Take 500 mg by mouth 1 (One) Time Per Week.), Disp: 90 tablet, Rfl: 1  •  multivitamin with minerals tablet tablet, Take 1 tablet by mouth Daily., Disp: , Rfl:   •  omeprazole (priLOSEC) 40 MG capsule, Take 1 capsule by mouth Daily., Disp: 90 capsule, Rfl: 1  •  ondansetron ODT (ZOFRAN-ODT) 4 MG disintegrating tablet, As Needed., Disp: , Rfl:   •  phentermine 37.5 MG capsule, Take 1 capsule by mouth Every Morning. (Patient taking differently: Take 37.5 mg by mouth As Needed.), Disp: 30 capsule, Rfl: 2  •  Progesterone (PROMETRIUM) 100 MG capsule, Take 1 capsule by mouth Daily., Disp: 90 capsule, Rfl: 3  •  sodium-potassium-magnesium sulfates (Suprep Bowel Prep Kit) 17.5-3.13-1.6 GM/177ML solution oral solution, Take 1 bottle by mouth Take As Directed. Follow instructions that were mailed to your home. If you didn't receive these call [phone]. (Patient taking differently: Take 1 bottle by mouth Take As Directed. Follow instructions that were mailed to your home. If you didn't receive these call [phone].    Needs new referral), Disp: 354 mL, Rfl: 0     Review of Systems   Cardiovascular: Negative for chest pain, dyspnea on exertion, irregular heartbeat, leg swelling, near-syncope and syncope.       Vitals:    08/23/22 1119   BP: 122/70   BP Location: Left arm   Patient Position: Sitting   Pulse: 69   SpO2: 99%   Weight: 85.7 kg (189 lb)   Height: 167.6 cm (66\")       Physical Exam  Constitutional:       Appearance: Normal " appearance.   Neck:      Vascular: No carotid bruit.   Cardiovascular:      Rate and Rhythm: Normal rate and regular rhythm.      Heart sounds: Normal heart sounds.   Pulmonary:      Effort: Pulmonary effort is normal.      Breath sounds: Normal breath sounds.   Musculoskeletal:      Right lower leg: No edema.      Left lower leg: No edema.   Neurological:      General: No focal deficit present.      Mental Status: She is alert.         Diagnostic Data:    ECG 12 Lead    Date/Time: 2022 11:36 AM  Performed by: Franky Mckeon MD  Authorized by: Franky Mckeon MD   Comparison: compared with previous ECG   Similar to previous ECG  Comparison to previous EC22  Rhythm: sinus rhythm  Rate: normal  BPM: 66  QRS axis: normal    Clinical impression: normal ECG          Lab Results   Component Value Date    TRIG 137 2022    HDL 56 2022     Lab Results   Component Value Date    GLUCOSE 86 2022    BUN 14 2022    CREATININE 0.76 2022     2022    K 4.3 2022     2022    CO2 24.0 2022     Lab Results   Component Value Date    HGBA1C 5.60 2022     Lab Results   Component Value Date    WBC 6.39 2022    HGB 13.9 2022    HCT 40.2 2022     2022       Assessment:   Diagnosis Plan   1. Chest pain, unspecified type  ECG 12 Lead       Plan:      1.  Chest pain, possibly GERD  -Patient has had a normal echo and EKG  -Patient states she has not had any symptoms since April.  Feels they have improved with PPI.  -She does walk regularly without reproducible symptoms  -At this time we will clinically monitor and if she has recurrences would recommend stress testing at that time.    2.   Hyperlipidemia: Total cholesterol 202, HDL 56, , triglycerides 137  -can be managed with lifestyle currently  -Her BP is in normal range today    We will follow-up in 1 year for symptom recheck.  I asked her to call us if  she has any return of symptoms and we will arrange additional testing.        Franky Mckeon MD FACC

## 2022-08-23 ENCOUNTER — OFFICE VISIT (OUTPATIENT)
Dept: CARDIOLOGY | Facility: CLINIC | Age: 52
End: 2022-08-23

## 2022-08-23 VITALS
HEART RATE: 69 BPM | SYSTOLIC BLOOD PRESSURE: 122 MMHG | BODY MASS INDEX: 30.37 KG/M2 | HEIGHT: 66 IN | DIASTOLIC BLOOD PRESSURE: 70 MMHG | WEIGHT: 189 LBS | OXYGEN SATURATION: 99 %

## 2022-08-23 DIAGNOSIS — R07.9 CHEST PAIN, UNSPECIFIED TYPE: Primary | ICD-10-CM

## 2022-08-23 PROCEDURE — 99204 OFFICE O/P NEW MOD 45 MIN: CPT | Performed by: INTERNAL MEDICINE

## 2022-08-23 PROCEDURE — 93000 ELECTROCARDIOGRAM COMPLETE: CPT | Performed by: INTERNAL MEDICINE

## 2022-08-23 RX ORDER — MECLIZINE HCL 12.5 MG/1
TABLET ORAL AS NEEDED
COMMUNITY

## 2022-08-23 RX ORDER — ONDANSETRON 4 MG/1
TABLET, ORALLY DISINTEGRATING ORAL AS NEEDED
COMMUNITY
End: 2023-04-03 | Stop reason: SDUPTHER

## 2022-11-14 ENCOUNTER — TELEPHONE (OUTPATIENT)
Dept: INTERNAL MEDICINE | Facility: CLINIC | Age: 52
End: 2022-11-14

## 2022-11-14 DIAGNOSIS — E66.09 CLASS 1 OBESITY DUE TO EXCESS CALORIES WITH SERIOUS COMORBIDITY AND BODY MASS INDEX (BMI) OF 31.0 TO 31.9 IN ADULT: ICD-10-CM

## 2022-11-14 RX ORDER — PHENTERMINE HYDROCHLORIDE 37.5 MG/1
37.5 CAPSULE ORAL EVERY MORNING
Qty: 30 CAPSULE | Refills: 2 | Status: SHIPPED | OUTPATIENT
Start: 2022-11-14 | End: 2023-02-27

## 2022-11-14 RX ORDER — METFORMIN HYDROCHLORIDE 500 MG/1
500 TABLET, EXTENDED RELEASE ORAL
Qty: 90 TABLET | Refills: 1 | Status: SHIPPED | OUTPATIENT
Start: 2022-11-14

## 2022-11-14 NOTE — TELEPHONE ENCOUNTER
Pharmacy Name:  [facility] ASK FOR A PHARMACIST     Pharmacy representative phone number:  [phone]    What medication are you calling in regards to: silver sulfadiazine (SILVADENE, SSD) 1 % cream    What question does the pharmacy have: THEY HAVE NOTED THE PATIENT IS ALLERGIC TO [facility] AND THEY WANT TO MAKE SURE THE PATIENT CAN USE THIS CREAM     Who is the provider that prescribed the medication: DR SULLIVAN     Additional notes:  PLEASE CALL

## 2022-12-19 ENCOUNTER — LAB (OUTPATIENT)
Dept: LAB | Facility: HOSPITAL | Age: 52
End: 2022-12-19

## 2022-12-19 DIAGNOSIS — R73.09 ABNORMAL GLUCOSE: ICD-10-CM

## 2022-12-19 DIAGNOSIS — E78.00 HYPERCHOLESTEROLEMIA: ICD-10-CM

## 2022-12-19 LAB
ALBUMIN SERPL-MCNC: 4.8 G/DL (ref 3.5–5.2)
ALBUMIN UR-MCNC: <1.2 MG/DL
ALBUMIN/GLOB SERPL: 1.9 G/DL
ALP SERPL-CCNC: 67 U/L (ref 39–117)
ALT SERPL W P-5'-P-CCNC: 15 U/L (ref 1–33)
ANION GAP SERPL CALCULATED.3IONS-SCNC: 7.5 MMOL/L (ref 5–15)
AST SERPL-CCNC: 19 U/L (ref 1–32)
BILIRUB SERPL-MCNC: 0.5 MG/DL (ref 0–1.2)
BILIRUB UR QL STRIP: NEGATIVE
BUN SERPL-MCNC: 14 MG/DL (ref 6–20)
BUN/CREAT SERPL: 16.7 (ref 7–25)
CALCIUM SPEC-SCNC: 10.1 MG/DL (ref 8.6–10.5)
CHLORIDE SERPL-SCNC: 103 MMOL/L (ref 98–107)
CLARITY UR: CLEAR
CO2 SERPL-SCNC: 29.5 MMOL/L (ref 22–29)
COLOR UR: YELLOW
CREAT SERPL-MCNC: 0.84 MG/DL (ref 0.57–1)
CREAT UR-MCNC: 123.5 MG/DL
EGFRCR SERPLBLD CKD-EPI 2021: 83.7 ML/MIN/1.73
GLOBULIN UR ELPH-MCNC: 2.5 GM/DL
GLUCOSE SERPL-MCNC: 97 MG/DL (ref 65–99)
GLUCOSE UR STRIP-MCNC: NEGATIVE MG/DL
HBA1C MFR BLD: 5.5 % (ref 4.8–5.6)
HGB UR QL STRIP.AUTO: NEGATIVE
KETONES UR QL STRIP: NEGATIVE
LEUKOCYTE ESTERASE UR QL STRIP.AUTO: ABNORMAL
MICROALBUMIN/CREAT UR: NORMAL MG/G{CREAT}
NITRITE UR QL STRIP: NEGATIVE
PH UR STRIP.AUTO: 6 [PH] (ref 5–8)
POTASSIUM SERPL-SCNC: 4.3 MMOL/L (ref 3.5–5.2)
PROT SERPL-MCNC: 7.3 G/DL (ref 6–8.5)
PROT UR QL STRIP: NEGATIVE
SODIUM SERPL-SCNC: 140 MMOL/L (ref 136–145)
SP GR UR STRIP: 1.02 (ref 1–1.03)
UROBILINOGEN UR QL STRIP: ABNORMAL

## 2022-12-19 PROCEDURE — 82043 UR ALBUMIN QUANTITATIVE: CPT

## 2022-12-19 PROCEDURE — 83036 HEMOGLOBIN GLYCOSYLATED A1C: CPT

## 2022-12-19 PROCEDURE — 80053 COMPREHEN METABOLIC PANEL: CPT

## 2022-12-19 PROCEDURE — 81001 URINALYSIS AUTO W/SCOPE: CPT

## 2022-12-19 PROCEDURE — 82570 ASSAY OF URINE CREATININE: CPT

## 2022-12-19 RX ORDER — OMEPRAZOLE 40 MG/1
CAPSULE, DELAYED RELEASE ORAL
Qty: 90 CAPSULE | Refills: 1 | Status: SHIPPED | OUTPATIENT
Start: 2022-12-19

## 2022-12-20 LAB
BACTERIA UR QL AUTO: ABNORMAL /HPF
HYALINE CASTS UR QL AUTO: ABNORMAL /LPF
MUCOUS THREADS URNS QL MICRO: ABNORMAL /HPF
RBC # UR STRIP: ABNORMAL /HPF
REF LAB TEST METHOD: ABNORMAL
SQUAMOUS #/AREA URNS HPF: ABNORMAL /HPF
WBC # UR STRIP: ABNORMAL /HPF

## 2022-12-21 ENCOUNTER — OFFICE VISIT (OUTPATIENT)
Dept: INTERNAL MEDICINE | Facility: CLINIC | Age: 52
End: 2022-12-21

## 2022-12-21 VITALS
WEIGHT: 182.6 LBS | HEIGHT: 66 IN | DIASTOLIC BLOOD PRESSURE: 70 MMHG | TEMPERATURE: 98.6 F | SYSTOLIC BLOOD PRESSURE: 114 MMHG | HEART RATE: 78 BPM | OXYGEN SATURATION: 99 % | BODY MASS INDEX: 29.35 KG/M2

## 2022-12-21 DIAGNOSIS — R73.09 ABNORMAL GLUCOSE: ICD-10-CM

## 2022-12-21 DIAGNOSIS — N95.9 MENOPAUSAL AND POSTMENOPAUSAL DISORDER: Chronic | ICD-10-CM

## 2022-12-21 DIAGNOSIS — E78.00 HYPERCHOLESTEROLEMIA: Primary | Chronic | ICD-10-CM

## 2022-12-21 DIAGNOSIS — L85.8 ACQUIRED KERATOSIS PILARIS: Chronic | ICD-10-CM

## 2022-12-21 DIAGNOSIS — L57.0 ACTINIC KERATOSIS OF LEFT CHEEK: Chronic | ICD-10-CM

## 2022-12-21 DIAGNOSIS — C43.30 MALIGNANT MELANOMA OF FACE EXCLUDING EYELID, NOSE, LIP, AND EAR: Chronic | ICD-10-CM

## 2022-12-21 DIAGNOSIS — E66.3 OVERWEIGHT WITH BODY MASS INDEX (BMI) OF 29 TO 29.9 IN ADULT: Chronic | ICD-10-CM

## 2022-12-21 PROBLEM — E66.09 CLASS 1 OBESITY DUE TO EXCESS CALORIES WITH SERIOUS COMORBIDITY AND BODY MASS INDEX (BMI) OF 31.0 TO 31.9 IN ADULT: Chronic | Status: RESOLVED | Noted: 2022-06-21 | Resolved: 2022-12-21

## 2022-12-21 PROBLEM — J34.89 LESION OF NOSE: Chronic | Status: ACTIVE | Noted: 2022-12-21

## 2022-12-21 PROBLEM — E66.811 CLASS 1 OBESITY DUE TO EXCESS CALORIES WITH SERIOUS COMORBIDITY AND BODY MASS INDEX (BMI) OF 31.0 TO 31.9 IN ADULT: Chronic | Status: RESOLVED | Noted: 2022-06-21 | Resolved: 2022-12-21

## 2022-12-21 PROCEDURE — 90686 IIV4 VACC NO PRSV 0.5 ML IM: CPT | Performed by: INTERNAL MEDICINE

## 2022-12-21 PROCEDURE — 99214 OFFICE O/P EST MOD 30 MIN: CPT | Performed by: INTERNAL MEDICINE

## 2022-12-21 PROCEDURE — 90471 IMMUNIZATION ADMIN: CPT | Performed by: INTERNAL MEDICINE

## 2022-12-21 RX ORDER — ESTRADIOL 0.04 MG/D
1 PATCH TRANSDERMAL WEEKLY
Qty: 12 PATCH | Refills: 3 | Status: SHIPPED | OUTPATIENT
Start: 2022-12-21 | End: 2023-02-08

## 2022-12-21 RX ORDER — PROGESTERONE 100 MG/1
100 CAPSULE ORAL DAILY
Qty: 90 CAPSULE | Refills: 3 | Status: SHIPPED | OUTPATIENT
Start: 2022-12-21

## 2022-12-21 NOTE — ASSESSMENT & PLAN NOTE
- She will call Dr. Tran Patrick's office to see if she can get her appointment scheduled, we did a referral in 06/2022.

## 2022-12-21 NOTE — ASSESSMENT & PLAN NOTE
- Hemoglobin A1c is improving.   - Continue to decrease sugars and carbohydrates in the diet.   - Continue trying to take metformin every morning.   - Continue regular exercise and walking.   - Continue working on weight loss.

## 2022-12-21 NOTE — ASSESSMENT & PLAN NOTE
- Continue using a cold pack as needed for severe itching.   - Continue using lotions twice a day.   - May continue the silver sulfadiazine cream twice per day.   - Avoid hot showers.   - Keep the water as cool as possible.   - We referred her to Dr. Tran Patrick's office and she will call them back to see if she can get her appointment scheduled.

## 2022-12-21 NOTE — ASSESSMENT & PLAN NOTE
- Continue walking every day.   - Continue to decrease sugars, breads, and carbohydrates in the diet.   - Drink lots of fluids.   - Continue taking phentermine every morning.   - Continue taking metformin every morning.   - Weigh once per week at home to monitor progress.

## 2022-12-21 NOTE — ASSESSMENT & PLAN NOTE
- Increase the Climara estradiol patch to 0.0375 mg per 24 hours once a week patch.   - Continue the current dose of progesterone 100 mg every evening.

## 2022-12-21 NOTE — PROGRESS NOTES
[facility]     Laine Hernandez  1970   [id number]      Patient Care Team:  Carina Chan MD as PCP - General (Internal Medicine)  Franky Mckeon MD as Consulting Physician (Cardiology)    Chief Complaint   Patient presents with   • Hyperlipidemia   • Seborrheic Keratosis     Itchy arms    CC: keratosis pilaris(not seb ker)        HPI  Patient is a 52 y.o. female presents with hypercholesterolemia and keratosis pilaris.    She states that overall, she feels much better than she did 1 year ago.    Postmenopausal symptoms   The patient states the Climara 0.025 mg hormone patches are no longer effective for her citing return of frequent hot flashes and mood swings over the last 6 weeks. The mood swings are reported to occur during the last 3 days prior to applying a new patch. She denies night sweats but confirms diaphoresis while getting ready in the morning despite having not turned on the heat yet. Ms. Hernandez denies any change in the brand of patch she has been using. Additionally, she takes progesterone 100 mg every night. Dietary changes include lower sugar and gluten intake. Of note, she is not amenable to trying an antidepressant for hot flashes due to multiple adverse effects with their use about 14 years ago. She recalls having tried approximately 12 different antidepressants, all of which caused significant side effects.     Heartburn   She does confirm occasional heartburn and indigestion, which occurs with consuming sugar. The patient notes eating a lot of chocolate pretzels on 12/18/2022, and states she is still experiencing indigestion as a result. At onset of indigestion, she takes omeprazole as needed for a few days at a time.    Keratosis pilaris   Ms. Hernandez has been diagnosed with keratosis pilaris and complains of itching of the bilateral upper arms since 10/2022. Per her report, application of ice is the only thing effective at stopping the itch and adds that she  has tried multiple creams in the past, which did not help. However, the use of SSD cream is reported to decrease her healing time. She notes using lotion twice per day and confirms scratching the rash. Denies having seen Dermatology as she did not receive a call to schedule.     Skin lesion  Additionally, she has a history of melanoma in the past and currently has 1 lesion on the nose, which has not worsened and 1 lesion on the left cheek, which has not worsened either. She confirms wearing sunscreen    Abnormal glucose   The patient is prescribed metformin once per day, though she admits to taking it only about half the time. Denies any adverse effects with its use.     Weight loss   Since her last visit in 06/2022, she has lost 8 pounds and confirms walking daily. She also takes phentermine and enjoys that she is able to eat without obsessing over certain food items. However, she states there are days in which she feels phentermine is not working at all. Denies palpitations and tachycardia with its use.     Blood pressure  Her blood pressure today is 114/70.    Hypercholesterolemia  Cholesterol levels are improving.    Result review  Her blood work completed on 12/19/2022 includes a CMP, which revealed normal kidney function, normal liver enzymes, and blood glucose of 97 mg/dL. Hemoglobin A1c was at 5.5 percent with an average blood glucose of approximately 112 mg/dL.     Health maintenance   Currently, she is due for a mammogram and colonoscopy, which she states she will schedule in 2023. The patient received the COVID-19 bivalent booster vaccine on 11/01/2022 and the influenza vaccine today. She is up to date on the shingles and tetanus vaccines.         CHRONIC CONDITIONS      Past Medical History:   Diagnosis Date   • Arthritis    • Class 1 obesity due to excess calories with serious comorbidity and body mass index (BMI) of 31.0 to 31.9 in adult 6/21/2022   • Depression    • Generalized anxiety disorder    •  "GERD (gastroesophageal reflux disease)    • Headache    • Menorrhalgia 2012    uterine ablation performed       Past Surgical History:   Procedure Laterality Date   • BREAST SURGERY  2002    augmentation    • BUNIONECTOMY      2014 and 2015   • SKIN CANCER EXCISION  2018    R cheek melanoma       Family History   Problem Relation Age of Onset   • Arthritis Mother    • Colon cancer Mother 81   • Heart attack Mother 84        cause of death   • COPD Mother    • Arthritis Father    • Diabetes Father         oral med   • Hyperlipidemia Father    • Hypertension Father    • Stroke Father 76   • Obesity Father    • Meniere's disease Father    • Other Sister         vertigo   • Meniere's disease Sister    • Sleep apnea Brother    • Diabetes Paternal Grandfather        Social History     Socioeconomic History   • Marital status:    Tobacco Use   • Smoking status: Never   • Smokeless tobacco: Never   Vaping Use   • Vaping Use: Never used   Substance and Sexual Activity   • Alcohol use: Yes     Comment: 1 aweek   • Drug use: Never   • Sexual activity: Never       Allergies   Allergen Reactions   • Sulfa Antibiotics Other (See Comments)     Per pt - swollen lymph nodes       Review of Systems:     Review of Systems   The appropriate review of systems is included in the HPI.    Vital Signs  Vitals:    12/21/22 1136   BP: 114/70   BP Location: Left arm   Patient Position: Sitting   Cuff Size: Adult   Pulse: 78   Temp: 98.6 °F (37 °C)   TempSrc: Infrared   SpO2: 99%   Weight: 82.8 kg (182 lb 9.6 oz)   Height: 167.6 cm (65.98\")   PainSc: 0-No pain     Body mass index is 29.49 kg/m².  BMI is >= 25 and <30. (Overweight) The following options were offered after discussion;: weight loss educational material (shared in after visit summary)        Current Outpatient Medications:   •  Ascorbic Acid (VITAMIN C PO), Take  by mouth Daily., Disp: , Rfl:   •  Calcium Carb-Cholecalciferol (CALCIUM 500/VITAMIN D PO), Take  by mouth. 2 " gummies daily, Disp: , Rfl:   •  Cholecalciferol (VITAMIN D3 PO), Take 1,000 Units by mouth 2 (Two) Times a Day. Not in summer months, Disp: , Rfl:   •  diclofenac (VOLTAREN) 50 MG EC tablet, TAKE ONE TABLET BY MOUTH TWICE A DAY AS NEEDED FOR PAIN, Disp: 60 tablet, Rfl: 5  •  fexofenadine (ALLEGRA) 180 MG tablet, Take 1 tablet by mouth Daily As Needed (allergies)., Disp: , Rfl:   •  Lactobacillus (ACIDOPHILUS PO), Take  by mouth Daily., Disp: , Rfl:   •  magnesium, as, gluconate (MAGONATE) 500 (27 Mg) MG tablet, Take 1 tablet by mouth Daily. (Patient taking differently: Take 27 mg by mouth Daily. Sometimes), Disp: 90 tablet, Rfl: 1  •  meclizine (ANTIVERT) 12.5 MG tablet, As Needed., Disp: , Rfl:   •  metFORMIN ER (GLUCOPHAGE-XR) 500 MG 24 hr tablet, Take 1 tablet by mouth Daily With Breakfast., Disp: 90 tablet, Rfl: 1  •  multivitamin with minerals tablet tablet, Take 1 tablet by mouth Daily., Disp: , Rfl:   •  omeprazole (priLOSEC) 40 MG capsule, TAKE ONE CAPSULE BY MOUTH DAILY (Patient taking differently: Daily As Needed.), Disp: 90 capsule, Rfl: 1  •  ondansetron ODT (ZOFRAN-ODT) 4 MG disintegrating tablet, As Needed., Disp: , Rfl:   •  phentermine 37.5 MG capsule, Take 1 capsule by mouth Every Morning., Disp: 30 capsule, Rfl: 2  •  Progesterone (PROMETRIUM) 100 MG capsule, Take 1 capsule by mouth Daily., Disp: 90 capsule, Rfl: 3  •  silver sulfadiazine (SILVADENE, SSD) 1 % cream, Apply 1 application topically to the appropriate area as directed 2 (Two) Times a Day As Needed (wound/cracking hands)., Disp: 85 g, Rfl: 1  •  estradiol (CLIMARA) 0.0375 MG/24HR, Place 1 patch on the skin as directed by provider 1 (One) Time Per Week., Disp: 12 patch, Rfl: 3  •  sodium-potassium-magnesium sulfates (Suprep Bowel Prep Kit) 17.5-3.13-1.6 GM/177ML solution oral solution, Take 1 bottle by mouth Take As Directed. Follow instructions that were mailed to your home. If you didn't receive these call [phone]. (Patient  taking differently: Take 1 bottle by mouth Take As Directed. Follow instructions that were mailed to your home. If you didn't receive these call [phone].    Needs new referral), Disp: 354 mL, Rfl: 0    Physical Exam:    Physical Exam  Vitals and nursing note reviewed.   Constitutional:       Appearance: She is well-developed and overweight.   HENT:      Head: Normocephalic.   Eyes:      Conjunctiva/sclera: Conjunctivae normal.      Pupils: Pupils are equal, round, and reactive to light.   Neck:      Thyroid: No thyromegaly.   Cardiovascular:      Rate and Rhythm: Normal rate and regular rhythm.      Heart sounds: Normal heart sounds.   Pulmonary:      Effort: Pulmonary effort is normal.      Breath sounds: Normal breath sounds. No wheezing.   Musculoskeletal:         General: Normal range of motion.      Cervical back: Normal range of motion and neck supple.   Lymphadenopathy:      Cervical: No cervical adenopathy.   Skin:     General: Skin is warm and dry.   Neurological:      Mental Status: She is alert and oriented to person, place, and time.   Psychiatric:         Thought Content: Thought content normal.             ACE III MINI        Results Review:    None    CMP:  Lab Results   Component Value Date    BUN 14 12/19/2022    CREATININE 0.84 12/19/2022    EGFRIFNONA 93 10/04/2021    BCR 16.7 12/19/2022     12/19/2022    K 4.3 12/19/2022    CO2 29.5 (H) 12/19/2022    CALCIUM 10.1 12/19/2022    ALBUMIN 4.80 12/19/2022    BILITOT 0.5 12/19/2022    ALKPHOS 67 12/19/2022    AST 19 12/19/2022    ALT 15 12/19/2022     HbA1c:  Lab Results   Component Value Date    HGBA1C 5.50 12/19/2022    HGBA1C 5.60 06/16/2022     Microalbumin:  Lab Results   Component Value Date    MICROALBUR <1.2 12/19/2022     Lipid Panel  Lab Results   Component Value Date    CHOL 202 (H) 06/16/2022    TRIG 137 06/16/2022    HDL 56 06/16/2022     (H) 06/16/2022    AST 19 12/19/2022    ALT 15 12/19/2022       Medication Review:  Medications reviewed and noted  Patient Instructions   Problem List Items Addressed This Visit        Cardiac and Vasculature    Hypercholesterolemia - Primary (Chronic)    Current Assessment & Plan     - Cholesterol has improved.   - Continue regular exercise and low fat, low sugar diet.   - Continue weight loss.             Endocrine and Metabolic    Abnormal glucose    Current Assessment & Plan     - Hemoglobin A1c is improving.   - Continue to decrease sugars and carbohydrates in the diet.   - Continue trying to take metformin every morning.   - Continue regular exercise and walking.   - Continue working on weight loss.             Genitourinary and Reproductive     Menopausal and postmenopausal disorder (Chronic)    Overview     Since starting HRT in October 2021, patient feels much better.  She is having less palpitations, less mood irritability, less anxious, sleeping better, less vaginal dryness, less migraines.  More irritability/mood swings and hot flashes over last several weeks.         Current Assessment & Plan     - Increase the Climara estradiol patch to 0.0375 mg per 24 hours once a week patch.   - Continue the current dose of progesterone 100 mg every evening.               Hematology and Neoplasia    Malignant melanoma of face excluding eyelid, nose, lip, and ear (HCC) (Chronic)    Overview     R cheek excision 2018.            Skin    Actinic keratosis of left cheek (Chronic)    Current Assessment & Plan     - She will call Dr. Tran Patrick's office to see if she can get her appointment scheduled, we did a referral in 06/2022.          Relevant Medications    silver sulfadiazine (SILVADENE, SSD) 1 % cream    Acquired keratosis pilaris (Chronic)    Current Assessment & Plan     - Continue using a cold pack as needed for severe itching.   - Continue using lotions twice a day.   - May continue the silver sulfadiazine cream twice per day.   - Avoid hot showers.   - Keep the water as cool as possible.    - We referred her to Dr. Tran Patrick's office and she will call them back to see if she can get her appointment scheduled.         Relevant Medications    silver sulfadiazine (SILVADENE, SSD) 1 % cream       Other    Overweight with body mass index (BMI) of 29 to 29.9 in adult (Chronic)    Current Assessment & Plan     - Continue walking every day.   - Continue to decrease sugars, breads, and carbohydrates in the diet.   - Drink lots of fluids.   - Continue taking phentermine every morning.   - Continue taking metformin every morning.   - Weigh once per week at home to monitor progress.                           - If she does not get an appointment set with Dr. Patrick's office, she will call us back and let us know.               - It is very important to see the dermatologist at least once per year for follow-up and full body checks.       Diagnosis Plan   1. Hypercholesterolemia  Comprehensive Metabolic Panel    Urinalysis With Microscopic - Urine, Clean Catch    Microalbumin / Creatinine Urine Ratio - Urine, Clean Catch      2. Malignant melanoma of face excluding eyelid, nose, lip, and ear (HCC)        3. Abnormal glucose  Hemoglobin A1c      4. Overweight with body mass index (BMI) of 29 to 29.9 in adult        5. Acquired keratosis pilaris        6. Actinic keratosis of left cheek        7. Menopausal and postmenopausal disorder                Plan of care reviewed with patient at the conclusion of today's visit. Education was provided regarding diagnosis, management, and any prescribed or recommended OTC medications.Patient verbalizes understanding of and agreement with management plan.      Transcribed from ambient dictation for Carina Chan MD by Suha Isidro.  12/21/22   14:23 EST    Patient or patient representative verbalized consent to the visit recording.

## 2022-12-21 NOTE — ASSESSMENT & PLAN NOTE
- Cholesterol has improved.   - Continue regular exercise and low fat, low sugar diet.   - Continue weight loss.

## 2022-12-22 NOTE — PATIENT INSTRUCTIONS
Patient Instructions   Problem List Items Addressed This Visit          Cardiac and Vasculature    Hypercholesterolemia - Primary (Chronic)    Current Assessment & Plan     - Cholesterol has improved.   - Continue regular exercise and low fat, low sugar diet.   - Continue weight loss.             Endocrine and Metabolic    Abnormal glucose    Current Assessment & Plan     - Hemoglobin A1c is improving.   - Continue to decrease sugars and carbohydrates in the diet.   - Continue trying to take metformin every morning.   - Continue regular exercise and walking.   - Continue working on weight loss.             Genitourinary and Reproductive     Menopausal and postmenopausal disorder (Chronic)    Overview     Since starting HRT in October 2021, patient feels much better.  She is having less palpitations, less mood irritability, less anxious, sleeping better, less vaginal dryness, less migraines.  More irritability/mood swings and hot flashes over last several weeks.         Current Assessment & Plan     - Increase the Climara estradiol patch to 0.0375 mg per 24 hours once a week patch.   - Continue the current dose of progesterone 100 mg every evening.               Hematology and Neoplasia    Malignant melanoma of face excluding eyelid, nose, lip, and ear (HCC) (Chronic)    Overview     R cheek excision 2018.            Skin    Actinic keratosis of left cheek (Chronic)    Current Assessment & Plan     - She will call Dr. Tran Patrick's office to see if she can get her appointment scheduled, we did a referral in 06/2022.          Relevant Medications    silver sulfadiazine (SILVADENE, SSD) 1 % cream    Acquired keratosis pilaris (Chronic)    Current Assessment & Plan     - Continue using a cold pack as needed for severe itching.   - Continue using lotions twice a day.   - May continue the silver sulfadiazine cream twice per day.   - Avoid hot showers.   - Keep the water as cool as possible.   - We referred her to   Tran Patrick's office and she will call them back to see if she can get her appointment scheduled.         Relevant Medications    silver sulfadiazine (SILVADENE, SSD) 1 % cream       Other    Overweight with body mass index (BMI) of 29 to 29.9 in adult (Chronic)    Current Assessment & Plan     - Continue walking every day.   - Continue to decrease sugars, breads, and carbohydrates in the diet.   - Drink lots of fluids.   - Continue taking phentermine every morning.   - Continue taking metformin every morning.   - Weigh once per week at home to monitor progress.                           - If she does not get an appointment set with Dr. Patrick's office, she will call us back and let us know.               - It is very important to see the dermatologist at least once per year for follow-up and full body checks.  BMI for Adults  What is BMI?  Body mass index (BMI) is a number that is calculated from a person's weight and height. BMI can help estimate how much of a person's weight is composed of fat. BMI does not measure body fat directly. Rather, it is an alternative to procedures that directly measure body fat, which can be difficult and expensive.  BMI can help identify people who may be at higher risk for certain medical problems.  What are BMI measurements used for?  BMI is used as a screening tool to identify possible weight problems. It helps determine whether a person is obese, overweight, a healthy weight, or underweight.  BMI is useful for:  Identifying a weight problem that may be related to a medical condition or may increase the risk for medical problems.  Promoting changes, such as changes in diet and exercise, to help reach a healthy weight. BMI screening can be repeated to see if these changes are working.  How is BMI calculated?  BMI involves measuring your weight in relation to your height. Both height and weight are measured, and the BMI is calculated from those numbers. This can be done either in  "English ([place] or metric measurements. Note that charts and online BMI calculators are available to help you find your BMI quickly and easily without having to do these calculations yourself.  To calculate your BMI in English ([place] measurements:    Measure your weight in pounds (lb).  Multiply the number of pounds by 703.  For example, for a person who weighs 180 lb, multiply that number by 703, which equals 126,540.  Measure your height in inches. Then multiply that number by itself to get a measurement called \"inches squared.\"  For example, for a person who is 70 inches tall, the \"inches squared\" measurement is 70 inches x 70 inches, which equals 4,900 inches squared.  Divide the total from step 2 (number of lb x 703) by the total from step 3 (inches squared): 126,540 ÷ 4,900 = 25.8. This is your BMI.  To calculate your BMI in metric measurements:  Measure your weight in kilograms (kg).  Measure your height in meters (m). Then multiply that number by itself to get a measurement called \"meters squared.\"  For example, for a person who is 1.75 m tall, the \"meters squared\" measurement is 1.75 m x 1.75 m, which is equal to 3.1 meters squared.  Divide the number of kilograms (your weight) by the meters squared number. In this example: 70 ÷ 3.1 = 22.6. This is your BMI.  What do the results mean?  BMI charts are used to identify whether you are underweight, normal weight, overweight, or obese. The following guidelines will be used:  Underweight: BMI less than 18.5.  Normal weight: BMI between 18.5 and 24.9.  Overweight: BMI between 25 and 29.9.  Obese: BMI of 30 or above.  Keep these notes in mind:  Weight includes both fat and muscle, so someone with a muscular build, such as an athlete, may have a BMI that is higher than 24.9. In cases like these, BMI is not an accurate measure of body fat.  To determine if excess body fat is the cause of a BMI of 25 or higher, further assessments may need to be done by a health care " provider.  BMI is usually interpreted in the same way for men and women.  Where to find more information  For more information about BMI, including tools to quickly calculate your BMI, go to these websites:  Centers for Disease Control and Prevention: www.cdc.gov  American Heart Association: www.heart.org  National Heart, Lung, and Blood [place]: www.nhlbi.nih.gov  Summary  Body mass index (BMI) is a number that is calculated from a person's weight and height.  BMI may help estimate how much of a person's weight is composed of fat. BMI can help identify those who may be at higher risk for certain medical problems.  BMI can be measured using English measurements or metric measurements.  BMI charts are used to identify whether you are underweight, normal weight, overweight, or obese.  This information is not intended to replace advice given to you by your health care provider. Make sure you discuss any questions you have with your health care provider.  Document Revised: 09/09/2020 Document Reviewed: 07/17/2020  [employer] Patient Education © 2022 [employer]  Calorie Counting for Weight Loss  Calories are units of energy. Your body needs a certain number of calories from food to keep going throughout the day. When you eat or drink more calories than your body needs, your body stores the extra calories mostly as fat. When you eat or drink fewer calories than your body needs, your body burns fat to get the energy it needs.  Calorie counting means keeping track of how many calories you eat and drink each day. Calorie counting can be helpful if you need to lose weight. If you eat fewer calories than your body needs, you should lose weight. Ask your health care provider what a healthy weight is for you.  For calorie counting to work, you will need to eat the right number of calories each day to lose a healthy amount of weight per week. A dietitian can help you figure out how many calories you need in a day and will  suggest ways to reach your calorie goal.  A healthy amount of weight to lose each week is usually 1-2 lb (0.5-0.9 kg). This usually means that your daily calorie intake should be reduced by 500-750 calories.  Eating 1,200-1,500 calories a day can help most women lose weight.  Eating 1,500-1,800 calories a day can help most men lose weight.  What do I need to know about calorie counting?  Work with your health care provider or dietitian to determine how many calories you should get each day. To meet your daily calorie goal, you will need to:  Find out how many calories are in each food that you would like to eat. Try to do this before you eat.  Decide how much of the food you plan to eat.  Keep a food log. Do this by writing down what you ate and how many calories it had.  To successfully lose weight, it is important to balance calorie counting with a healthy lifestyle that includes regular activity.  Where do I find calorie information?  The number of calories in a food can be found on a Nutrition Facts label. If a food does not have a Nutrition Facts label, try to look up the calories online or ask your dietitian for help.  Remember that calories are listed per serving. If you choose to have more than one serving of a food, you will have to multiply the calories per serving by the number of servings you plan to eat. For example, the label on a package of bread might say that a serving size is 1 slice and that there are 90 calories in a serving. If you eat 1 slice, you will have eaten 90 calories. If you eat 2 slices, you will have eaten 180 calories.  How do I keep a food log?  After each time that you eat, record the following in your food log as soon as possible:  What you ate. Be sure to include toppings, sauces, and other extras on the food.  How much you ate. This can be measured in cups, ounces, or number of items.  How many calories were in each food and drink.  The total number of calories in the food you  ate.  Keep your food log near you, such as in a pocket-sized notebook or on an mendel or website on your mobile phone. Some programs will calculate calories for you and show you how many calories you have left to meet your daily goal.  What are some portion-control tips?  Know how many calories are in a serving. This will help you know how many servings you can have of a certain food.  Use a measuring cup to measure serving sizes. You could also try weighing out portions on a kitchen scale. With time, you will be able to estimate serving sizes for some foods.  Take time to put servings of different foods on your favorite plates or in your favorite bowls and cups so you know what a serving looks like.  Try not to eat straight from a food's packaging, such as from a bag or box. Eating straight from the package makes it hard to see how much you are eating and can lead to overeating. Put the amount you would like to eat in a cup or on a plate to make sure you are eating the right portion.  Use smaller plates, glasses, and bowls for smaller portions and to prevent overeating.  Try not to multitask. For example, avoid watching TV or using your computer while eating. If it is time to eat, sit down at a table and enjoy your food. This will help you recognize when you are full. It will also help you be more mindful of what and how much you are eating.  What are tips for following this plan?  Reading food labels  Check the calorie count compared with the serving size. The serving size may be smaller than what you are used to eating.  Check the source of the calories. Try to choose foods that are high in protein, fiber, and vitamins, and low in saturated fat, trans fat, and sodium.  Shopping  Read nutrition labels while you shop. This will help you make healthy decisions about which foods to buy.  Pay attention to nutrition labels for low-fat or fat-free foods. These foods sometimes have the same number of calories or more  calories than the full-fat versions. They also often have added sugar, starch, or salt to make up for flavor that was removed with the fat.  Make a grocery list of lower-calorie foods and stick to it.  Cooking  Try to cook your favorite foods in a healthier way. For example, try baking instead of frying.  Use low-fat dairy products.  Meal planning  Use more fruits and vegetables. One-half of your plate should be fruits and vegetables.  Include lean proteins, such as chicken, turkey, and fish.  Lifestyle  Each week, aim to do one of the followin minutes of moderate exercise, such as walking.  75 minutes of vigorous exercise, such as running.  General information  Know how many calories are in the foods you eat most often. This will help you calculate calorie counts faster.  Find a way of tracking calories that works for you. Get creative. Try different apps or programs if writing down calories does not work for you.  What foods should I eat?    Eat nutritious foods. It is better to have a nutritious, high-calorie food, such as an avocado, than a food with few nutrients, such as a bag of potato chips.  Use your calories on foods and drinks that will fill you up and will not leave you hungry soon after eating.  Examples of foods that fill you up are nuts and nut butters, vegetables, lean proteins, and high-fiber foods such as whole grains. High-fiber foods are foods with more than 5 g of fiber per serving.  Pay attention to calories in drinks. Low-calorie drinks include water and unsweetened drinks.  The items listed above may not be a complete list of foods and beverages you can eat. Contact a dietitian for more information.  What foods should I limit?  Limit foods or drinks that are not good sources of vitamins, minerals, or protein or that are high in unhealthy fats. These include:  Candy.  Other sweets.  Sodas, specialty coffee drinks, alcohol, and juice.  The items listed above may not be a complete list of  foods and beverages you should avoid. Contact a dietitian for more information.  How do I count calories when eating out?  Pay attention to portions. Often, portions are much larger when eating out. Try these tips to keep portions smaller:  Consider sharing a meal instead of getting your own.  If you get your own meal, eat only half of it. Before you start eating, ask for a container and put half of your meal into it.  When available, consider ordering smaller portions from the menu instead of full portions.  Pay attention to your food and drink choices. Knowing the way food is cooked and what is included with the meal can help you eat fewer calories.  If calories are listed on the menu, choose the lower-calorie options.  Choose dishes that include vegetables, fruits, whole grains, low-fat dairy products, and lean proteins.  Choose items that are boiled, broiled, grilled, or steamed. Avoid items that are buttered, battered, fried, or served with cream sauce. Items labeled as crispy are usually fried, unless stated otherwise.  Choose water, low-fat milk, unsweetened iced tea, or other drinks without added sugar. If you want an alcoholic beverage, choose a lower-calorie option, such as a glass of wine or light beer.  Ask for dressings, sauces, and syrups on the side. These are usually high in calories, so you should limit the amount you eat.  If you want a salad, choose a garden salad and ask for grilled meats. Avoid extra toppings such as izquierdo, cheese, or fried items. Ask for the dressing on the side, or ask for olive oil and vinegar or lemon to use as dressing.  Estimate how many servings of a food you are given. Knowing serving sizes will help you be aware of how much food you are eating at restaurants.  Where to find more information  Centers for Disease Control and Prevention: www.cdc.gov  [place]. Department of Agriculture: myplate.gov  Summary  Calorie counting means keeping track of how many calories you eat and  drink each day. If you eat fewer calories than your body needs, you should lose weight.  A healthy amount of weight to lose per week is usually 1-2 lb (0.5-0.9 kg). This usually means reducing your daily calorie intake by 500-750 calories.  The number of calories in a food can be found on a Nutrition Facts label. If a food does not have a Nutrition Facts label, try to look up the calories online or ask your dietitian for help.  Use smaller plates, glasses, and bowls for smaller portions and to prevent overeating.  Use your calories on foods and drinks that will fill you up and not leave you hungry shortly after a meal.  This information is not intended to replace advice given to you by your health care provider. Make sure you discuss any questions you have with your health care provider.  Document Revised: 01/28/2021 Document Reviewed: 01/28/2021  Elsevier Patient Education © 2022 [employer]

## 2023-02-08 NOTE — TELEPHONE ENCOUNTER
Rx Refill Note  Requested Prescriptions     Pending Prescriptions Disp Refills   • estradiol (CLIMARA) 0.025 MG/24HR patch [Pharmacy Med Name: ESTRADIOL PATCH 0.025 MG/DAY ([facility])] 4 patch      Sig: APPLY ONE PATCH TO THE SKIN ONCE WEEKLY      Last office visit with prescribing clinician: 12/21/2022   Last telemedicine visit with prescribing clinician: Visit date not found   Next office visit with prescribing clinician: 12/7/2023                         Would you like a call back once the refill request has been completed: [] Yes [] No    If the office needs to give you a call back, can they leave a voicemail: [] Yes [] No    Bridgett Peña LPN  02/08/23, 07:42 EST

## 2023-02-26 DIAGNOSIS — E66.09 CLASS 1 OBESITY DUE TO EXCESS CALORIES WITH SERIOUS COMORBIDITY AND BODY MASS INDEX (BMI) OF 31.0 TO 31.9 IN ADULT: ICD-10-CM

## 2023-02-27 RX ORDER — PHENTERMINE HYDROCHLORIDE 37.5 MG/1
CAPSULE ORAL
Qty: 30 CAPSULE | Refills: 2 | Status: SHIPPED | OUTPATIENT
Start: 2023-02-27 | End: 2023-04-03 | Stop reason: SDUPTHER

## 2023-02-27 NOTE — TELEPHONE ENCOUNTER
Rx Refill Note  Requested Prescriptions     Pending Prescriptions Disp Refills   • silver sulfadiazine (SILVADENE, SSD) 1 % cream [Pharmacy Med Name: SILVER SULFADIAZINE 1% CREAM] 50 g      Sig: APPLY TOPICALLY TO APPROPRIATE AREA AS DIRECTED TWO TIMES A DAY AS NEEDED FOR WOUND/CRACKING HANDS   • phentermine 37.5 MG capsule [Pharmacy Med Name: PHENTERMINE 37.5 MG CAPSULE] 30 capsule      Sig: TAKE ONE CAPSULE BY MOUTH EVERY MORNING      Last office visit with prescribing clinician: 12/21/2022   Next office visit with prescribing clinician: 12/7/2023     LA: 11/14/22 #30 2R                            Would you like a call back once the refill request has been completed: [] Yes [] No    If the office needs to give you a call back, can they leave a voicemail: [] Yes [] No    Nicole Gamez LPN  02/27/23, 09:13 EST

## 2023-04-03 ENCOUNTER — TELEPHONE (OUTPATIENT)
Dept: INTERNAL MEDICINE | Facility: CLINIC | Age: 53
End: 2023-04-03

## 2023-04-03 DIAGNOSIS — E66.09 CLASS 1 OBESITY DUE TO EXCESS CALORIES WITH SERIOUS COMORBIDITY AND BODY MASS INDEX (BMI) OF 31.0 TO 31.9 IN ADULT: ICD-10-CM

## 2023-04-03 RX ORDER — ONDANSETRON 4 MG/1
4 TABLET, ORALLY DISINTEGRATING ORAL AS NEEDED
Qty: 30 TABLET | Refills: 0 | Status: CANCELLED | OUTPATIENT
Start: 2023-04-03

## 2023-04-03 RX ORDER — PHENTERMINE HYDROCHLORIDE 37.5 MG/1
37.5 CAPSULE ORAL EVERY MORNING
Qty: 30 CAPSULE | Refills: 2 | Status: SHIPPED | OUTPATIENT
Start: 2023-04-03

## 2023-04-03 RX ORDER — PAROXETINE 10 MG/1
10 TABLET, FILM COATED ORAL EVERY MORNING
Qty: 30 TABLET | Refills: 5 | Status: SHIPPED | OUTPATIENT
Start: 2023-04-03

## 2023-04-03 RX ORDER — ONDANSETRON 4 MG/1
4 TABLET, ORALLY DISINTEGRATING ORAL EVERY 12 HOURS PRN
Qty: 30 TABLET | Refills: 0 | Status: SHIPPED | OUTPATIENT
Start: 2023-04-03

## 2023-04-03 RX ORDER — MINOXIDIL 10 MG/1
5 TABLET ORAL DAILY
Qty: 30 TABLET | Refills: 5 | Status: SHIPPED | OUTPATIENT
Start: 2023-04-03

## 2023-04-03 RX ORDER — ONDANSETRON 4 MG/1
4 TABLET, ORALLY DISINTEGRATING ORAL AS NEEDED
Qty: 30 TABLET | Refills: 0 | Status: SHIPPED | OUTPATIENT
Start: 2023-04-03 | End: 2023-04-03 | Stop reason: SDUPTHER

## 2023-04-03 NOTE — TELEPHONE ENCOUNTER
Rx Refill Note  Requested Prescriptions     Pending Prescriptions Disp Refills   • ondansetron ODT (ZOFRAN-ODT) 4 MG disintegrating tablet       Sig: As Needed.   • phentermine 37.5 MG capsule 30 capsule 2     Sig: Take 1 capsule by mouth Every Morning.      Last office visit with prescribing clinician: 12/21/2022   Next office visit with prescribing clinician: 12/7/2023     LA: 02/27/23 #30 2R                          Would you like a call back once the refill request has been completed: [] Yes [] No    If the office needs to give you a call back, can they leave a voicemail: [] Yes [] No    Nicole Gamez LPN  04/03/23, 10:45 EDT

## 2023-04-03 NOTE — TELEPHONE ENCOUNTER
Pharmacy Name: [facility] [id number] - [place], [place] - [address] AT [facility] [address] - [phone]  - [phone]      Pharmacy representative name: TEMO    Pharmacy representative phone number: [phone]    What medication are you calling in regards to: KANNAN    What question does the pharmacy have: PHARMACY NEEDS CLARIFICATION-FREQUENCY

## 2023-04-03 NOTE — TELEPHONE ENCOUNTER
Rx Refill Note  Requested Prescriptions     Pending Prescriptions Disp Refills   • silver sulfadiazine (SILVADENE, SSD) 1 % cream [Pharmacy Med Name: SILVER SULFADIAZINE 1% CREAM] 50 g 0     Sig: APPLY TO AFFECTED AREA(S) TWO TIMES A DAY AS NEEDED FOR WOUND/CRACKING HANDS      Last office visit with prescribing clinician: 12/21/2022   Next office visit with prescribing clinician: 12/07/23                        Would you like a call back once the refill request has been completed: [] Yes [] No    If the office needs to give you a call back, can they leave a voicemail: [] Yes [] No    Nicole Gamez LPN  04/03/23, 10:51 EDT

## 2023-06-19 RX ORDER — OMEPRAZOLE 40 MG/1
CAPSULE, DELAYED RELEASE ORAL
Qty: 90 CAPSULE | Refills: 1 | Status: SHIPPED | OUTPATIENT
Start: 2023-06-19

## 2023-07-23 DIAGNOSIS — E66.09 CLASS 1 OBESITY DUE TO EXCESS CALORIES WITH SERIOUS COMORBIDITY AND BODY MASS INDEX (BMI) OF 31.0 TO 31.9 IN ADULT: ICD-10-CM

## 2023-07-24 RX ORDER — METFORMIN HYDROCHLORIDE 500 MG/1
TABLET, EXTENDED RELEASE ORAL
Qty: 90 TABLET | Refills: 1 | Status: SHIPPED | OUTPATIENT
Start: 2023-07-24

## 2023-07-24 NOTE — TELEPHONE ENCOUNTER
Rx Refill Note  Requested Prescriptions     Pending Prescriptions Disp Refills    metFORMIN ER (GLUCOPHAGE-XR) 500 MG 24 hr tablet [Pharmacy Med Name: METFORMIN HCL  MG TABLET] 90 tablet 1     Sig: TAKE ONE TABLET BY MOUTH DAILY WITH BREAKFAST      Last office visit with prescribing clinician: 12/21/2022   Last telemedicine visit with prescribing clinician: Visit date not found   Next office visit with prescribing clinician: 12/7/2023                         Would you like a call back once the refill request has been completed: [] Yes [] No    If the office needs to give you a call back, can they leave a voicemail: [] Yes [] No    Bridgett Peña LPN  07/24/23, 08:52 EDT

## 2023-09-04 DIAGNOSIS — E66.09 CLASS 1 OBESITY DUE TO EXCESS CALORIES WITH SERIOUS COMORBIDITY AND BODY MASS INDEX (BMI) OF 31.0 TO 31.9 IN ADULT: ICD-10-CM

## 2023-09-05 RX ORDER — PHENTERMINE HYDROCHLORIDE 37.5 MG/1
CAPSULE ORAL
Qty: 30 CAPSULE | Refills: 0 | Status: SHIPPED | OUTPATIENT
Start: 2023-09-05

## 2023-09-05 NOTE — TELEPHONE ENCOUNTER
Rx Refill Note  Requested Prescriptions     Pending Prescriptions Disp Refills    phentermine 37.5 MG capsule [Pharmacy Med Name: PHENTERMINE 37.5 MG CAPSULE] 30 capsule      Sig: TAKE ONE CAPSULE BY MOUTH EVERY MORNING      Last office visit with prescribing clinician: 12/21/2022   Last telemedicine visit with prescribing clinician: Visit date not found   Next office visit with prescribing clinician: 12/7/2023     LA: 04/03/23 #30 2R                          Would you like a call back once the refill request has been completed: [] Yes [] No    If the office needs to give you a call back, can they leave a voicemail: [] Yes [] No    Nicole Gamez LPN  09/05/23, 09:42 EDT

## 2023-09-13 DIAGNOSIS — M79.673 CHRONIC HEEL PAIN, UNSPECIFIED LATERALITY: Primary | ICD-10-CM

## 2023-09-13 DIAGNOSIS — G89.29 CHRONIC HEEL PAIN, UNSPECIFIED LATERALITY: Primary | ICD-10-CM

## 2023-12-06 ENCOUNTER — LAB (OUTPATIENT)
Dept: LAB | Facility: HOSPITAL | Age: 53
End: 2023-12-06
Payer: COMMERCIAL

## 2023-12-06 DIAGNOSIS — R73.09 ABNORMAL GLUCOSE: ICD-10-CM

## 2023-12-06 DIAGNOSIS — E78.00 HYPERCHOLESTEROLEMIA: ICD-10-CM

## 2023-12-06 DIAGNOSIS — E55.9 VITAMIN D DEFICIENCY: ICD-10-CM

## 2023-12-06 LAB
BASOPHILS # BLD AUTO: 0.05 10*3/MM3 (ref 0–0.2)
BASOPHILS NFR BLD AUTO: 0.7 % (ref 0–1.5)
DEPRECATED RDW RBC AUTO: 40.9 FL (ref 37–54)
EOSINOPHIL # BLD AUTO: 0.15 10*3/MM3 (ref 0–0.4)
EOSINOPHIL NFR BLD AUTO: 2.2 % (ref 0.3–6.2)
ERYTHROCYTE [DISTWIDTH] IN BLOOD BY AUTOMATED COUNT: 12.2 % (ref 12.3–15.4)
HBA1C MFR BLD: 5.7 % (ref 4.8–5.6)
HCT VFR BLD AUTO: 41.5 % (ref 34–46.6)
HGB BLD-MCNC: 14.8 G/DL (ref 12–15.9)
IMM GRANULOCYTES # BLD AUTO: 0.02 10*3/MM3 (ref 0–0.05)
IMM GRANULOCYTES NFR BLD AUTO: 0.3 % (ref 0–0.5)
LYMPHOCYTES # BLD AUTO: 2.07 10*3/MM3 (ref 0.7–3.1)
LYMPHOCYTES NFR BLD AUTO: 30.5 % (ref 19.6–45.3)
MCH RBC QN AUTO: 33 PG (ref 26.6–33)
MCHC RBC AUTO-ENTMCNC: 35.7 G/DL (ref 31.5–35.7)
MCV RBC AUTO: 92.6 FL (ref 79–97)
MONOCYTES # BLD AUTO: 0.49 10*3/MM3 (ref 0.1–0.9)
MONOCYTES NFR BLD AUTO: 7.2 % (ref 5–12)
NEUTROPHILS NFR BLD AUTO: 4 10*3/MM3 (ref 1.7–7)
NEUTROPHILS NFR BLD AUTO: 59.1 % (ref 42.7–76)
NRBC BLD AUTO-RTO: 0 /100 WBC (ref 0–0.2)
PLATELET # BLD AUTO: 379 10*3/MM3 (ref 140–450)
PMV BLD AUTO: 9.9 FL (ref 6–12)
RBC # BLD AUTO: 4.48 10*6/MM3 (ref 3.77–5.28)
WBC NRBC COR # BLD AUTO: 6.78 10*3/MM3 (ref 3.4–10.8)

## 2023-12-06 PROCEDURE — 80050 GENERAL HEALTH PANEL: CPT

## 2023-12-06 PROCEDURE — 80061 LIPID PANEL: CPT

## 2023-12-06 PROCEDURE — 81001 URINALYSIS AUTO W/SCOPE: CPT

## 2023-12-06 PROCEDURE — 83036 HEMOGLOBIN GLYCOSYLATED A1C: CPT

## 2023-12-06 PROCEDURE — 82043 UR ALBUMIN QUANTITATIVE: CPT

## 2023-12-06 PROCEDURE — 82570 ASSAY OF URINE CREATININE: CPT

## 2023-12-06 PROCEDURE — 82306 VITAMIN D 25 HYDROXY: CPT

## 2023-12-07 ENCOUNTER — OFFICE VISIT (OUTPATIENT)
Dept: INTERNAL MEDICINE | Facility: CLINIC | Age: 53
End: 2023-12-07
Payer: COMMERCIAL

## 2023-12-07 VITALS
DIASTOLIC BLOOD PRESSURE: 66 MMHG | HEIGHT: 66 IN | BODY MASS INDEX: 30.7 KG/M2 | HEART RATE: 81 BPM | WEIGHT: 191 LBS | OXYGEN SATURATION: 98 % | TEMPERATURE: 97.8 F | SYSTOLIC BLOOD PRESSURE: 124 MMHG

## 2023-12-07 DIAGNOSIS — Z12.11 COLON CANCER SCREENING: ICD-10-CM

## 2023-12-07 DIAGNOSIS — Z12.31 ENCOUNTER FOR SCREENING MAMMOGRAM FOR BREAST CANCER: ICD-10-CM

## 2023-12-07 DIAGNOSIS — M62.9 NONTRAUMATIC TEAR OF PLANTAR FASCIA: ICD-10-CM

## 2023-12-07 DIAGNOSIS — N95.2 POST-MENOPAUSAL ATROPHIC VAGINITIS: Chronic | ICD-10-CM

## 2023-12-07 DIAGNOSIS — E55.9 VITAMIN D DEFICIENCY: ICD-10-CM

## 2023-12-07 DIAGNOSIS — N94.19 DYSPAREUNIA DUE TO MEDICAL CONDITION IN FEMALE: Chronic | ICD-10-CM

## 2023-12-07 DIAGNOSIS — Z13.820 OSTEOPOROSIS SCREENING: ICD-10-CM

## 2023-12-07 DIAGNOSIS — E66.09 CLASS 1 OBESITY DUE TO EXCESS CALORIES WITH SERIOUS COMORBIDITY AND BODY MASS INDEX (BMI) OF 30.0 TO 30.9 IN ADULT: ICD-10-CM

## 2023-12-07 DIAGNOSIS — E66.09 CLASS 1 OBESITY DUE TO EXCESS CALORIES WITH SERIOUS COMORBIDITY AND BODY MASS INDEX (BMI) OF 31.0 TO 31.9 IN ADULT: ICD-10-CM

## 2023-12-07 DIAGNOSIS — E78.00 HYPERCHOLESTEROLEMIA: Chronic | ICD-10-CM

## 2023-12-07 DIAGNOSIS — F51.01 PRIMARY INSOMNIA: ICD-10-CM

## 2023-12-07 DIAGNOSIS — C43.30 MALIGNANT MELANOMA OF FACE EXCLUDING EYELID, NOSE, LIP, AND EAR: Chronic | ICD-10-CM

## 2023-12-07 DIAGNOSIS — R73.09 ABNORMAL GLUCOSE: ICD-10-CM

## 2023-12-07 DIAGNOSIS — Z00.00 ANNUAL PHYSICAL EXAM: Primary | ICD-10-CM

## 2023-12-07 PROBLEM — E66.811 CLASS 1 OBESITY DUE TO EXCESS CALORIES WITH SERIOUS COMORBIDITY AND BODY MASS INDEX (BMI) OF 30.0 TO 30.9 IN ADULT: Status: ACTIVE | Noted: 2022-06-21

## 2023-12-07 LAB
25(OH)D3 SERPL-MCNC: 50.7 NG/ML (ref 30–100)
ALBUMIN SERPL-MCNC: 5.1 G/DL (ref 3.5–5.2)
ALBUMIN UR-MCNC: 1.2 MG/DL
ALBUMIN/GLOB SERPL: 2.4 G/DL
ALP SERPL-CCNC: 78 U/L (ref 39–117)
ALT SERPL W P-5'-P-CCNC: 14 U/L (ref 1–33)
AMORPH URATE CRY URNS QL MICRO: NORMAL /HPF
ANION GAP SERPL CALCULATED.3IONS-SCNC: 12.5 MMOL/L (ref 5–15)
AST SERPL-CCNC: 16 U/L (ref 1–32)
BACTERIA UR QL AUTO: NORMAL /HPF
BILIRUB SERPL-MCNC: 0.6 MG/DL (ref 0–1.2)
BILIRUB UR QL STRIP: NEGATIVE
BUN SERPL-MCNC: 14 MG/DL (ref 6–20)
BUN/CREAT SERPL: 18.9 (ref 7–25)
CALCIUM SPEC-SCNC: 9.9 MG/DL (ref 8.6–10.5)
CHLORIDE SERPL-SCNC: 99 MMOL/L (ref 98–107)
CHOLEST SERPL-MCNC: 239 MG/DL (ref 0–200)
CLARITY UR: CLEAR
CO2 SERPL-SCNC: 26.5 MMOL/L (ref 22–29)
COLOR UR: YELLOW
CREAT SERPL-MCNC: 0.74 MG/DL (ref 0.57–1)
CREAT UR-MCNC: 75.6 MG/DL
EGFRCR SERPLBLD CKD-EPI 2021: 96.9 ML/MIN/1.73
GLOBULIN UR ELPH-MCNC: 2.1 GM/DL
GLUCOSE SERPL-MCNC: 95 MG/DL (ref 65–99)
GLUCOSE UR STRIP-MCNC: NEGATIVE MG/DL
HDLC SERPL-MCNC: 84 MG/DL (ref 40–60)
HGB UR QL STRIP.AUTO: NEGATIVE
HYALINE CASTS UR QL AUTO: NORMAL /LPF
KETONES UR QL STRIP: NEGATIVE
LDLC SERPL CALC-MCNC: 125 MG/DL (ref 0–100)
LDLC/HDLC SERPL: 1.43 {RATIO}
LEUKOCYTE ESTERASE UR QL STRIP.AUTO: NEGATIVE
MICROALBUMIN/CREAT UR: 15.9 MG/G (ref 0–29)
NITRITE UR QL STRIP: NEGATIVE
PH UR STRIP.AUTO: 7 [PH] (ref 5–8)
POTASSIUM SERPL-SCNC: 4 MMOL/L (ref 3.5–5.2)
PROT SERPL-MCNC: 7.2 G/DL (ref 6–8.5)
PROT UR QL STRIP: NEGATIVE
RBC # UR STRIP: NORMAL /HPF
REF LAB TEST METHOD: NORMAL
SODIUM SERPL-SCNC: 138 MMOL/L (ref 136–145)
SP GR UR STRIP: 1.01 (ref 1–1.03)
SQUAMOUS #/AREA URNS HPF: NORMAL /HPF
STARCH GRANULES URNS QL MICRO: NORMAL /HPF
TRIGL SERPL-MCNC: 173 MG/DL (ref 0–150)
TSH SERPL DL<=0.05 MIU/L-ACNC: 0.72 UIU/ML (ref 0.27–4.2)
UROBILINOGEN UR QL STRIP: NORMAL
VLDLC SERPL-MCNC: 30 MG/DL (ref 5–40)
WBC # UR STRIP: NORMAL /HPF

## 2023-12-07 RX ORDER — PHENDIMETRAZINE TARTRATE 35 MG/1
35 TABLET ORAL 3 TIMES DAILY
Qty: 90 EACH | Refills: 2 | Status: SHIPPED | OUTPATIENT
Start: 2023-12-07

## 2023-12-07 RX ORDER — NIACIN 500 MG
1500 TABLET ORAL DAILY
COMMUNITY

## 2023-12-07 RX ORDER — TRAZODONE HYDROCHLORIDE 50 MG/1
50 TABLET ORAL NIGHTLY
Qty: 30 TABLET | Refills: 5 | Status: SHIPPED | OUTPATIENT
Start: 2023-12-07

## 2023-12-07 NOTE — ASSESSMENT & PLAN NOTE
We will try half to a whole tablet of trazodone every evening at least an hour or more before she wants to be asleep. Try to get ready for bed at 9 PM in order to get enough time in bed every night.

## 2023-12-07 NOTE — ASSESSMENT & PLAN NOTE
Increasing exercise and decreasing sugars and white carbohydrates in the diet will help a lot to prevent blood sugar from worsening.

## 2023-12-07 NOTE — ASSESSMENT & PLAN NOTE
She will gradually increase walking. Also try swimming or other exercises that are easy on her foot. She could also try chair yoga, Pilates, and Ronald Chi. She will decrease fats and sugars in the diet. Weigh at home once per week to monitor progress. Try to lose about 10 pounds over the next 6 months.

## 2023-12-07 NOTE — PROGRESS NOTES
Preventative Annual Visit    Laine Hernandez  1970   [id number]    Patient Care Team:  Carina Cahn MD as PCP - General (Internal Medicine)  Franky Mckeon MD as Consulting Physician (Cardiology)    Chief Complaint::   Chief Complaint   Patient presents with    Annual Exam    Hyperlipidemia    Weight Check        Subjective   History of Present Illness    Laine Hernandez is a 53 y.o. female who presents for an Annual Wellness Visit.    Health maintenance  She has had her influenza vaccine.  The patient is due for the new COVID-19 booster.  She is due for a mammogram.  The patient has not had a bone density scan.  She is due for a colonoscopy.   Her mother had colon cancer.     CHRONIC CONDITIONS    Hypertension  Her blood pressure is good at 124/66 mmHg.    Acid reflux  The patient has been on omeprazole 20 mg for almost a year. She weaned herself off of it. She notices acid reflex twice a week. The patient was put on 40 mg and that is how she weaned herself off of it. She puts some vinegar in her drink and it really helps.     Patient Active Problem List   Diagnosis    Dyspareunia due to medical condition in female    Post-menopausal atrophic vaginitis    Migraine without aura and without status migrainosus, not intractable    Primary osteoarthritis of both hands    Palpitations    Family history of colon cancer in mother    Menopausal and postmenopausal disorder    Precordial pain    Abnormal glucose    Sudden idiopathic hearing loss of right ear with unrestricted hearing of left ear    Dizziness    Ganglion cyst of flexor tendon sheath of finger of left hand    Cough in adult    Class 1 obesity due to excess calories with serious comorbidity and body mass index (BMI) of 30.0 to 30.9 in adult    Dysgeusia    Malignant melanoma of face excluding eyelid, nose, lip, and ear    Hypercholesterolemia    Actinic keratosis of left cheek    Acquired keratosis pilaris    Lesion of nose    Primary  insomnia    Nontraumatic tear of plantar fascia        Past Medical History:   Diagnosis Date    Allergic 2005    The usual pollen allergies, plus sulfa antibiotics and latex    Arthritis     Cancer 2019    On face - removed in 2019    Cataract 2019    Class 1 obesity due to excess calories with serious comorbidity and body mass index (BMI) of 31.0 to 31.9 in adult 06/21/2022    Depression     Diverticulosis 2011    Just one episode, none since    Generalized anxiety disorder     GERD (gastroesophageal reflux disease)     Headache     Menorrhalgia 2012    uterine ablation performed       Past Surgical History:   Procedure Laterality Date    BREAST SURGERY  2002    augmentation     BUNIONECTOMY      2014 and 2015    ENDOMETRIAL ABLATION  2005    EYE SURGERY  2000    Lasik    FRACTURE SURGERY  2014 and 2015    Not fractures but bunionectomies    SKIN CANCER EXCISION  2018    R cheek melanoma       Family History   Problem Relation Age of Onset    Arthritis Mother     Colon cancer Mother 81    Heart attack Mother 84        cause of death    COPD Mother     Alcohol abuse Mother     Anxiety disorder Mother     Cancer Mother         Colon Cancer    Heart disease Mother     Miscarriages / Stillbirths Mother         Stillbirth    Arthritis Father     Diabetes Father         oral med    Hyperlipidemia Father     Hypertension Father     Stroke Father 76    Obesity Father     Meniere's disease Father     Heart attack Father     Alcohol abuse Father     Hearing loss Father     Heart disease Father     Other Sister         Meniere's    Meniere's disease Sister     Hearing loss Sister     Sleep apnea Brother     Diabetes Paternal Grandfather     Miscarriages / Stillbirths Sister         Stillbirth       Social History     Socioeconomic History    Marital status:    Tobacco Use    Smoking status: Never    Smokeless tobacco: Never    Tobacco comments:     Both parents smoked when I was growing up   Vaping Use    Vaping Use:  Never used   Substance and Sexual Activity    Alcohol use: Yes     Alcohol/week: 4.0 standard drinks of alcohol     Types: 2 Glasses of wine, 2 Drinks containing 0.5 oz of alcohol per week     Comment: 1 aweek    Drug use: Never    Sexual activity: Yes     Partners: Male     Birth control/protection: Vasectomy     Comment: My vaginal atrophy is so bad, intercourse is not possible.       Allergies   Allergen Reactions    Sulfa Antibiotics Other (See Comments)     Per pt - swollen lymph nodes         Current Outpatient Medications:     Ascorbic Acid (VITAMIN C PO), Take  by mouth Daily., Disp: , Rfl:     Calcium Carb-Cholecalciferol (CALCIUM 500/VITAMIN D PO), Take  by mouth. 2 gummies daily, Disp: , Rfl:     Cholecalciferol (VITAMIN D3 PO), Take 1,000 Units by mouth 2 (Two) Times a Day. Not in summer months, Disp: , Rfl:     diclofenac (VOLTAREN) 50 MG EC tablet, TAKE ONE TABLET BY MOUTH TWICE A DAY AS NEEDED FOR PAIN, Disp: 60 tablet, Rfl: 5    fexofenadine (ALLEGRA) 180 MG tablet, Take 1 tablet by mouth Daily As Needed (allergies)., Disp: , Rfl:     Lactobacillus (ACIDOPHILUS PO), Take  by mouth Daily., Disp: , Rfl:     magnesium, as, gluconate (MAGONATE) 500 (27 Mg) MG tablet, Take 1 tablet by mouth Daily., Disp: 90 tablet, Rfl: 1    meclizine (ANTIVERT) 12.5 MG tablet, As Needed., Disp: , Rfl:     metFORMIN ER (GLUCOPHAGE-XR) 500 MG 24 hr tablet, Take 1 tablet by mouth Daily With Breakfast., Disp: 90 tablet, Rfl: 1    multivitamin with minerals tablet tablet, Take 1 tablet by mouth Daily., Disp: , Rfl:     niacin 500 MG tablet, Take 3 tablets by mouth Daily., Disp: , Rfl:     Sodium Hyaluronate, oral, (HYALURONIC ACID PO), Take  by mouth Daily., Disp: , Rfl:     ondansetron ODT (ZOFRAN-ODT) 4 MG disintegrating tablet, Place 1 tablet on the tongue Every 12 (Twelve) Hours As Needed for Nausea. (Patient not taking: Reported on 12/7/2023), Disp: 30 tablet, Rfl: 0    PARoxetine (Paxil) 10 MG tablet, Take 1 tablet by  "mouth Every Morning., Disp: 30 tablet, Rfl: 5    Phendimetrazine Tartrate 35 MG tablet, Take 35 mg by mouth 3 times a day., Disp: 90 each, Rfl: 2    traZODone (DESYREL) 50 MG tablet, Take 1 tablet by mouth Every Night., Disp: 30 tablet, Rfl: 5    Immunization History   Administered Date(s) Administered    COVID-19 (MODERNA) 1st,2nd,3rd Dose Monovalent 03/31/2021, 05/04/2021, 12/23/2021    COVID-19 (PFIZER) BIVALENT 12+YRS 11/01/2022    Flu Vaccine Quad PF >36MO 09/30/2019    Fluzone (or Fluarix & Flulaval for VFC) >6mos 09/30/2019, 09/29/2021, 12/21/2022, 12/07/2023    Shingrix 12/16/2021, 06/24/2022    Tdap 04/26/2019        Health Maintenance Due   Topic Date Due    MAMMOGRAM  Never done    COLORECTAL CANCER SCREENING  Never done    HEPATITIS C SCREENING  Never done    PAP SMEAR  09/18/2022    ANNUAL PHYSICAL  06/21/2023    COVID-19 Vaccine (5 - 2023-24 season) 09/01/2023        Review of Systems     Vital Signs  Vitals:    12/07/23 1110   BP: 124/66   BP Location: Left arm   Patient Position: Sitting   Cuff Size: Adult   Pulse: 81   Temp: 97.8 °F (36.6 °C)   TempSrc: Infrared   SpO2: 98%   Weight: 86.6 kg (191 lb)   Height: 167.6 cm (65.98\")     BMI is >= 30 and <35. (Class 1 Obesity). The following options were offered after discussion;: weight loss educational material (shared in after visit summary), exercise counseling/recommendations, nutrition counseling/recommendations, and Information on healthy weight added to patient's after visit summary.     Physical Exam  Vitals and nursing note reviewed.   Constitutional:       Appearance: She is well-developed and overweight.   Eyes:      Conjunctiva/sclera: Conjunctivae normal.      Pupils: Pupils are equal, round, and reactive to light.   Neck:      Thyroid: No thyromegaly.   Cardiovascular:      Rate and Rhythm: Normal rate and regular rhythm.      Heart sounds: Normal heart sounds. No murmur heard.  Pulmonary:      Effort: Pulmonary effort is normal.      " Breath sounds: Normal breath sounds. No wheezing.   Chest:   Breasts:     Right: No inverted nipple, mass, nipple discharge, skin change or tenderness.      Left: No inverted nipple, mass, nipple discharge, skin change or tenderness.      Comments: Bilateral breast implants are soft. Chest wall is smooth.   Abdominal:      General: Bowel sounds are normal. There is no distension.      Palpations: Abdomen is soft. There is no mass.      Tenderness: There is no abdominal tenderness.   Musculoskeletal:         General: No tenderness. Normal range of motion.      Cervical back: Normal range of motion and neck supple.      Comments: Right foot in a soft brace.   Lymphadenopathy:      Cervical: No cervical adenopathy.      Upper Body:      Right upper body: No axillary adenopathy.      Left upper body: No axillary adenopathy.   Skin:     General: Skin is warm and dry.      Findings: No rash.   Neurological:      Mental Status: She is alert and oriented to person, place, and time.      Cranial Nerves: No cranial nerve deficit.      Sensory: No sensory deficit.      Coordination: Coordination normal.      Gait: Gait normal.   Psychiatric:         Speech: Speech normal.         Behavior: Behavior normal.         Thought Content: Thought content normal.         Judgment: Judgment normal.            ECG 12 Lead    Date/Time: 12/8/2023 8:20 AM  Performed by: Carina Chan MD    Authorized by: Carina Chan MD  Comparison: compared with previous ECG   Similar to previous ECG  Rhythm: sinus rhythm  Rate: normal  BPM: 73  Conduction: conduction normal  ST Segments: ST segments normal  T Waves: T waves normal  QRS axis: normal    Clinical impression: normal ECG           Fall Risk Screen:  [facility] Fall Risk Assessment has not been completed.    Health Habits and Functional and Cognitive Screening:       No data to display                Smoking Status:  Social History     Tobacco Use   Smoking Status Never   Smokeless  Tobacco Never   Tobacco Comments    Both parents smoked when I was growing up       Alcohol Consumption:  Social History     Substance and Sexual Activity   Alcohol Use Yes    Alcohol/week: 4.0 standard drinks of alcohol    Types: 2 Glasses of wine, 2 Drinks containing 0.5 oz of alcohol per week    Comment: 1 aweek       Depression [place]  PHQ-9:        12/7/2023    11:14 AM   PHQ-2/PHQ-9 Depression Screening   Little Interest or Pleasure in Doing Things 0-->not at all   Feeling Down, Depressed or Hopeless 0-->not at all   PHQ-9: Brief Depression Severity Measure Score 0      Labs  Results for orders placed or performed in visit on 12/06/23   Comprehensive Metabolic Panel    Specimen: Blood   Result Value Ref Range    Glucose 95 65 - 99 mg/dL    BUN 14 6 - 20 mg/dL    Creatinine 0.74 0.57 - 1.00 mg/dL    Sodium 138 136 - 145 mmol/L    Potassium 4.0 3.5 - 5.2 mmol/L    Chloride 99 98 - 107 mmol/L    CO2 26.5 22.0 - 29.0 mmol/L    Calcium 9.9 8.6 - 10.5 mg/dL    Total Protein 7.2 6.0 - 8.5 g/dL    Albumin 5.1 3.5 - 5.2 g/dL    ALT (SGPT) 14 1 - 33 U/L    AST (SGOT) 16 1 - 32 U/L    Alkaline Phosphatase 78 39 - 117 U/L    Total Bilirubin 0.6 0.0 - 1.2 mg/dL    Globulin 2.1 gm/dL    A/G Ratio 2.4 g/dL    BUN/Creatinine Ratio 18.9 7.0 - 25.0    Anion Gap 12.5 5.0 - 15.0 mmol/L    eGFR 96.9 >60.0 mL/min/1.73   Hemoglobin A1c    Specimen: Blood   Result Value Ref Range    Hemoglobin A1C 5.70 (H) 4.80 - 5.60 %   Lipid Panel    Specimen: Blood   Result Value Ref Range    Total Cholesterol 239 (H) 0 - 200 mg/dL    Triglycerides 173 (H) 0 - 150 mg/dL    HDL Cholesterol 84 (H) 40 - 60 mg/dL    LDL Cholesterol  125 (H) 0 - 100 mg/dL    VLDL Cholesterol 30 5 - 40 mg/dL    LDL/HDL Ratio 1.43    Microalbumin / Creatinine Urine Ratio - Urine, Clean Catch    Specimen: Urine, Clean Catch   Result Value Ref Range    Microalbumin/Creatinine Ratio 15.9 0.0 - 29.0 mg/g    Creatinine, Urine 75.6 mg/dL    Microalbumin, Urine 1.2 mg/dL    TSH    Specimen: Blood   Result Value Ref Range    TSH 0.721 0.270 - 4.200 uIU/mL   Vitamin D,25-Hydroxy    Specimen: Blood   Result Value Ref Range    25 Hydroxy, Vitamin D 50.7 30.0 - 100.0 ng/ml   CBC Auto Differential    Specimen: Blood   Result Value Ref Range    WBC 6.78 3.40 - 10.80 10*3/mm3    RBC 4.48 3.77 - 5.28 10*6/mm3    Hemoglobin 14.8 12.0 - 15.9 g/dL    Hematocrit 41.5 34.0 - 46.6 %    MCV 92.6 79.0 - 97.0 fL    MCH 33.0 26.6 - 33.0 pg    MCHC 35.7 31.5 - 35.7 g/dL    RDW 12.2 (L) 12.3 - 15.4 %    RDW-SD 40.9 37.0 - 54.0 fl    MPV 9.9 6.0 - 12.0 fL    Platelets 379 140 - 450 10*3/mm3    Neutrophil % 59.1 42.7 - 76.0 %    Lymphocyte % 30.5 19.6 - 45.3 %    Monocyte % 7.2 5.0 - 12.0 %    Eosinophil % 2.2 0.3 - 6.2 %    Basophil % 0.7 0.0 - 1.5 %    Immature Grans % 0.3 0.0 - 0.5 %    Neutrophils, Absolute 4.00 1.70 - 7.00 10*3/mm3    Lymphocytes, Absolute 2.07 0.70 - 3.10 10*3/mm3    Monocytes, Absolute 0.49 0.10 - 0.90 10*3/mm3    Eosinophils, Absolute 0.15 0.00 - 0.40 10*3/mm3    Basophils, Absolute 0.05 0.00 - 0.20 10*3/mm3    Immature Grans, Absolute 0.02 0.00 - 0.05 10*3/mm3    nRBC 0.0 0.0 - 0.2 /100 WBC   Urinalysis without microscopic (no culture) - Urine, Clean Catch    Specimen: Urine, Clean Catch   Result Value Ref Range    Color, UA Yellow Yellow, Straw    Appearance, UA Clear Clear    pH, UA 7.0 5.0 - 8.0    Specific Gravity, UA 1.013 1.005 - 1.030    Glucose, UA Negative Negative    Ketones, UA Negative Negative    Bilirubin, UA Negative Negative    Blood, UA Negative Negative    Protein, UA Negative Negative    Leuk Esterase, UA Negative Negative    Nitrite, UA Negative Negative    Urobilinogen, UA 0.2 E.U./dL 0.2 - 1.0 E.U./dL   Urinalysis, Microscopic Only - Urine, Clean Catch    Specimen: Urine, Clean Catch   Result Value Ref Range    RBC, UA None Seen None Seen, 0-2 /HPF    WBC, UA 0-2 None Seen, 0-2 /HPF    Bacteria, UA None Seen None Seen /HPF    Squamous Epithelial Cells, UA 0-2  None Seen, 0-2 /HPF    Hyaline Casts, UA None Seen None Seen /LPF    Amorphous Crystals, UA Small/1+ None Seen /HPF    Starch, UA Small/1+ None Seen /HPF    Methodology Manual Light Microscopy         Assessment & Plan     Patient Self-Management and Personalized Health Advice    The patient has been provided counseling and guidance about: diet, exercise, weight management, prevention of cardiac or vascular disease, the relationship between weight and GERD, and mental health concerns and preventive services including:   Annual Wellness Visit (AWV).  Patient Instructions   Problem List Items Addressed This Visit          Cardiac and Vasculature    Hypercholesterolemia (Chronic)    Current Assessment & Plan     She will gradually increase walking. Also try swimming or other exercises that are easy on her foot. She could also try chair yoga, Pilates, and Ronald Chi. She will decrease fats and sugars in the diet. Weigh at home once per week to monitor progress. Try to lose about 10 pounds over the next 6 months.         Relevant Orders    CBC & Differential (Completed)    Comprehensive Metabolic Panel (Completed)    Lipid Panel (Completed)    Microalbumin / Creatinine Urine Ratio - Urine, Clean Catch (Completed)    Urinalysis With Microscopic - Urine, Clean Catch (Completed)    TSH (Completed)       Endocrine and Metabolic    Abnormal glucose    Current Assessment & Plan     Increasing exercise and decreasing sugars and white carbohydrates in the diet will help a lot to prevent blood sugar from worsening.         Relevant Orders    Hemoglobin A1c (Completed)    Class 1 obesity due to excess calories with serious comorbidity and body mass index (BMI) of 30.0 to 30.9 in adult    Current Assessment & Plan     She will continue taking metformin daily.  Add phendimetrazine 3 times a day with meals.  We discussed possible side effects.    She will gradually increase walking. Also try swimming, and other exercises that are easy  on her foot. She could also try chair yoga, Pilates, and Ronald Chi. She will decrease fats and sugars in the diet. Weigh at home once per week to monitor progress. Try to lose about 10 pounds over the next 6 months.           Relevant Medications    Phendimetrazine Tartrate 35 MG tablet    metFORMIN ER (GLUCOPHAGE-XR) 500 MG 24 hr tablet       Genitourinary and Reproductive     Dyspareunia due to medical condition in female (Chronic)    Overview     See above.         Current Assessment & Plan     We are referring to Dr. Cassidy Rodriguez, Gyn.          Relevant Orders    Ambulatory Referral to Gynecology    Post-menopausal atrophic vaginitis (Chronic)    Overview     Continue estradiol patch weekly and progesterone tablet every evening.    She will try Replens vaginal moisturizer.  We also discussed the possibility of compounding a coconut oil vaginal cream.         Current Assessment & Plan     We are referring to Dr. Cassidy Rodriguez.         Relevant Orders    Ambulatory Referral to Gynecology       Hematology and Neoplasia    Malignant melanoma of face excluding eyelid, nose, lip, and ear (Chronic)    Overview     R cheek excision 2018.         Current Assessment & Plan     She will continue regular follow-up with her dermatologist at least once a year.             Musculoskeletal and Injuries    Nontraumatic tear of plantar fascia    Current Assessment & Plan     She will continue wearing good supportive shoes.  Gradually increase the walking.  Be careful not to overdo it.  Continue wearing the soft brace.  Follow-up with the podiatrist.            Sleep    Primary insomnia    Current Assessment & Plan     We will try half to a whole tablet of trazodone every evening at least an hour or more before she wants to be asleep. Try to get ready for bed at 9 PM in order to get enough time in bed every night.         Relevant Medications    traZODone (DESYREL) 50 MG tablet     Other Visit Diagnoses       Annual physical exam    -   Primary    Colon cancer screening        Relevant Orders    Ambulatory Referral For Screening Colonoscopy    Vitamin D deficiency        Relevant Orders    Vitamin D,25-Hydroxy (Completed)    Osteoporosis screening        Relevant Orders    DEXA Bone Density Axial    Encounter for screening mammogram for breast cancer        Relevant Orders    Mammo Screening Digital Tomosynthesis Bilateral With CAD    Class 1 obesity due to excess calories with serious comorbidity and body mass index (BMI) of 31.0 to 31.9 in adult        Relevant Medications    metFORMIN ER (GLUCOPHAGE-XR) 500 MG 24 hr tablet               Diagnosis Plan   1. Annual physical exam        2. Hypercholesterolemia  CBC & Differential    Comprehensive Metabolic Panel    Lipid Panel    Microalbumin / Creatinine Urine Ratio - Urine, Clean Catch    Urinalysis With Microscopic - Urine, Clean Catch    TSH      3. Abnormal glucose  Hemoglobin A1c      4. Primary insomnia  traZODone (DESYREL) 50 MG tablet      5. Post-menopausal atrophic vaginitis  Ambulatory Referral to Gynecology      6. Dyspareunia due to medical condition in female  Ambulatory Referral to Gynecology      7. Class 1 obesity due to excess calories with serious comorbidity and body mass index (BMI) of 30.0 to 30.9 in adult  Phendimetrazine Tartrate 35 MG tablet      8. Malignant melanoma of face excluding eyelid, nose, lip, and ear        9. Nontraumatic tear of plantar fascia        10. Colon cancer screening  Ambulatory Referral For Screening Colonoscopy      11. Vitamin D deficiency  Vitamin D,25-Hydroxy      12. Osteoporosis screening  DEXA Bone Density Axial      13. Encounter for screening mammogram for breast cancer  Mammo Screening Digital Tomosynthesis Bilateral With CAD      14. Class 1 obesity due to excess calories with serious comorbidity and body mass index (BMI) of 31.0 to 31.9 in adult  metFORMIN ER (GLUCOPHAGE-XR) 500 MG 24 hr tablet          Outpatient Encounter Medications  as of 12/7/2023   Medication Sig Dispense Refill    Ascorbic Acid (VITAMIN C PO) Take  by mouth Daily.      Calcium Carb-Cholecalciferol (CALCIUM 500/VITAMIN D PO) Take  by mouth. 2 gummies daily      Cholecalciferol (VITAMIN D3 PO) Take 1,000 Units by mouth 2 (Two) Times a Day. Not in summer months      diclofenac (VOLTAREN) 50 MG EC tablet TAKE ONE TABLET BY MOUTH TWICE A DAY AS NEEDED FOR PAIN 60 tablet 5    fexofenadine (ALLEGRA) 180 MG tablet Take 1 tablet by mouth Daily As Needed (allergies).      Lactobacillus (ACIDOPHILUS PO) Take  by mouth Daily.      magnesium, as, gluconate (MAGONATE) 500 (27 Mg) MG tablet Take 1 tablet by mouth Daily. 90 tablet 1    meclizine (ANTIVERT) 12.5 MG tablet As Needed.      metFORMIN ER (GLUCOPHAGE-XR) 500 MG 24 hr tablet Take 1 tablet by mouth Daily With Breakfast. 90 tablet 1    multivitamin with minerals tablet tablet Take 1 tablet by mouth Daily.      niacin 500 MG tablet Take 3 tablets by mouth Daily.      Sodium Hyaluronate, oral, (HYALURONIC ACID PO) Take  by mouth Daily.      [DISCONTINUED] metFORMIN ER (GLUCOPHAGE-XR) 500 MG 24 hr tablet TAKE ONE TABLET BY MOUTH DAILY WITH BREAKFAST 90 tablet 1    ondansetron ODT (ZOFRAN-ODT) 4 MG disintegrating tablet Place 1 tablet on the tongue Every 12 (Twelve) Hours As Needed for Nausea. (Patient not taking: Reported on 12/7/2023) 30 tablet 0    PARoxetine (Paxil) 10 MG tablet Take 1 tablet by mouth Every Morning. 30 tablet 5    Phendimetrazine Tartrate 35 MG tablet Take 35 mg by mouth 3 times a day. 90 each 2    traZODone (DESYREL) 50 MG tablet Take 1 tablet by mouth Every Night. 30 tablet 5    [DISCONTINUED] estradiol (CLIMARA) 0.025 MG/24HR patch APPLY ONE PATCH TO THE SKIN ONCE WEEKLY 4 patch 11    [DISCONTINUED] minoxidil (LONITEN) 10 MG tablet Take 0.5 tablets by mouth Daily. (Patient not taking: Reported on 12/7/2023) 30 tablet 5    [DISCONTINUED] omeprazole (priLOSEC) 40 MG capsule TAKE ONE CAPSULE BY MOUTH DAILY 90  capsule 1    [DISCONTINUED] phentermine 37.5 MG capsule TAKE ONE CAPSULE BY MOUTH EVERY MORNING (Patient not taking: Reported on 12/7/2023) 30 capsule 0    [DISCONTINUED] Progesterone (PROMETRIUM) 100 MG capsule Take 1 capsule by mouth Daily. 90 capsule 3    [DISCONTINUED] silver sulfadiazine (SILVADENE, SSD) 1 % cream APPLY TO AFFECTED AREA(S) TWO TIMES A DAY AS NEEDED FOR WOUND/CRACKING HANDS 50 g 0    [DISCONTINUED] sodium-potassium-magnesium sulfates (Suprep Bowel Prep Kit) 17.5-3.13-1.6 GM/177ML solution oral solution Take 1 bottle by mouth Take As Directed. Follow instructions that were mailed to your home. If you didn't receive these call [phone]. (Patient taking differently: Take 1 bottle by mouth Take As Directed. Follow instructions that were mailed to your home. If you didn't receive these call [phone].    Needs new referral) 354 mL 0     No facility-administered encounter medications on file as of 12/7/2023.     Age appropriate preventive counseling done including age appropriate vaccines,regular  Mammogram and self breast exam, pap smear, colonoscopy, regular dental visits, mental health, injury prevention such as wearing seat belt and preventing falls, healthy  nutrition, healthy weight, regular physical exercise. Alcohol use is moderate.  Tobacco history-none. Drug use-none.  STD's-not at risk.    Follow Up:  Return in about 1 year (around 12/7/2024) for Annual physical.         An After Visit Summary and PPPS with all of these plans were given to the patient.      Additional E&M Note during same encounter follows:  Patient has multiple medical problems which are significant and separately identifiable that require additional work above and beyond the Medicare Wellness Visit.      Chief Complaint  Annual Exam, Hyperlipidemia, and Weight Check    Subjective        HPI  Laine Hernandez is also being seen today for post URI cough resolving, weight gain, obesity, vaginal dryness, atrophy,  and dyspareunia    Cough  The patient states that she got sick 17 days ago and is still coughing through the night. She did the COVID-19 test twice and it was negative. The patient never got a fever. She is getting better, but still has a little cough.    Weight gain/obesity/hypercholesterolemia  The patient is concerned about her weight and her cholesterol being high. She would like to work on that without medication. The patient would like to try and bring that down herself. She would like to try and get some weight off.     Since she could not exercise due to foot pain, she was eating. She thinks she gained 20 pounds. The patient was doing well on the phentermine in the past but then it seemed to quit working.  She has been off of it several months. The patient has not tried phendimetrazine. She is taking metformin.she feels that has helped.  The patient used to do yoga, but the arthritis in her hands prevents her from doing many of the poses. She just saw a video and chair yoga and she is going to try that.     Right foot pain  The patient states that she tore her plantar fascia of right foot in 06/2023. She thought it was plantar fasciitis and was stretching it  She could not walk by 09/2023. The patient was sent to [facility]. It is , but she is able to walk finally. She did not get any decent exercise for 4 months. The patient is walking her dogs now every day about a quarter mile. 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     Vaginal atrophy  The patient has not been able to have sex in over a year. She has tried Premarin cream without improvement. The patient used it for more than a month. She is committed to this because she needs it. The patient has not found the patch helpful in the past. She went off it is because it did not help the hot flashes or moodiness. The patient is still  "having hot flashes and moodiness. She went on the [employer] root powder, but it helps with that. The patient was using the estrogen cream 3 nights a week.    Insomnia  The patient is having trouble sleeping. She has tried melatonin 5 to 10 mg, which helps her fall asleep, but it does not keep her asleep. The patient had some old trazodone 50 mg that she tried and it worked really well.  The patient takes it 1.5 hours later when she turns the light out. She reads.    Hyperlipidemia  Her LDL was 125 mg/dL and back in 2022 it was 99 mg/dL. Her HDL is very good at 84 mg/dL. Her triglycerides went up as well to 173 mg/dL. The patient has been eating a lot of cheese.       Objective   Vital Signs:  /66 (BP Location: Left arm, Patient Position: Sitting, Cuff Size: Adult)   Pulse 81   Temp 97.8 °F (36.6 °C) (Infrared)   Ht 167.6 cm (65.98\")   Wt 86.6 kg (191 lb)   SpO2 98%   BMI 30.84 kg/m²     [unfilled]               Assessment and Plan   Diagnoses and all orders for this visit:    1. Annual physical exam (Primary)    2. Hypercholesterolemia  Assessment & Plan:  She will gradually increase walking. Also try swimming or other exercises that are easy on her foot. She could also try chair yoga, Pilates, and Ronald Chi. She will decrease fats and sugars in the diet. Weigh at home once per week to monitor progress. Try to lose about 10 pounds over the next 6 months.    Orders:  -     CBC & Differential; Future  -     Comprehensive Metabolic Panel; Future  -     Lipid Panel; Future  -     Microalbumin / Creatinine Urine Ratio - Urine, Clean Catch; Future  -     Urinalysis With Microscopic - Urine, Clean Catch; Future  -     TSH; Future    3. Abnormal glucose  Assessment & Plan:  Increasing exercise and decreasing sugars and white carbohydrates in the diet will help a lot to prevent blood sugar from worsening.    Orders:  -     Hemoglobin A1c; Future    4. Primary insomnia  Assessment & Plan:  We will try half to a whole " tablet of trazodone every evening at least an hour or more before she wants to be asleep. Try to get ready for bed at 9 PM in order to get enough time in bed every night.    Orders:  -     traZODone (DESYREL) 50 MG tablet; Take 1 tablet by mouth Every Night.  Dispense: 30 tablet; Refill: 5    5. Post-menopausal atrophic vaginitis  Assessment & Plan:  We are referring to Dr. Cassidy Rodriguez.    Orders:  -     Ambulatory Referral to Gynecology    6. Dyspareunia due to medical condition in female  Assessment & Plan:  We are referring to Dr. Cassidy Rodriguez, Gyn.     Orders:  -     Ambulatory Referral to Gynecology    7. Class 1 obesity due to excess calories with serious comorbidity and body mass index (BMI) of 30.0 to 30.9 in adult  Assessment & Plan:  She will continue taking metformin daily.  Add phendimetrazine 3 times a day with meals.  We discussed possible side effects.    She will gradually increase walking. Also try swimming, and other exercises that are easy on her foot. She could also try chair yoga, Pilates, and Ronald Chi. She will decrease fats and sugars in the diet. Weigh at home once per week to monitor progress. Try to lose about 10 pounds over the next 6 months.      Orders:  -     Phendimetrazine Tartrate 35 MG tablet; Take 35 mg by mouth 3 times a day.  Dispense: 90 each; Refill: 2    8. Malignant melanoma of face excluding eyelid, nose, lip, and ear  Assessment & Plan:  She will continue regular follow-up with her dermatologist at least once a year.       9. Nontraumatic tear of plantar fascia  Assessment & Plan:  She will continue wearing good supportive shoes.  Gradually increase the walking.  Be careful not to overdo it.  Continue wearing the soft brace.  Follow-up with the podiatrist.      10. Colon cancer screening  -     Ambulatory Referral For Screening Colonoscopy    11. Vitamin D deficiency  -     Vitamin D,25-Hydroxy; Future    12. Osteoporosis screening  -     DEXA Bone Density Axial; Future    13.  Encounter for screening mammogram for breast cancer  -     Mammo Screening Digital Tomosynthesis Bilateral With CAD; Future    14. Class 1 obesity due to excess calories with serious comorbidity and body mass index (BMI) of 31.0 to 31.9 in adult  -     metFORMIN ER (GLUCOPHAGE-XR) 500 MG 24 hr tablet; Take 1 tablet by mouth Daily With Breakfast.  Dispense: 90 tablet; Refill: 1    Other orders  -     Fluzone (or Fluarix & Flulaval for VFC) >6mos  -     ECG 12 Lead             Follow Up   Return in about 1 year (around 12/7/2024) for Annual physical.  Patient was given instructions and counseling regarding her condition or for health maintenance advice. Please see specific information pulled into the AVS if appropriate.     Note: Part of this note may be an electronic transcription/translation of spoken language to printed text using the Dragon Dictation System.     Carina Chan MD     Transcribed from ambient dictation for Carina Chan MD by Gretchen Pena.  12/07/23   13:32 EST    Patient or patient representative verbalized consent to the visit recording.  I have personally performed the services described in this document as transcribed by the above individual, and it is both accurate and complete.

## 2023-12-07 NOTE — ASSESSMENT & PLAN NOTE
She will continue taking metformin daily.  Add phendimetrazine 3 times a day with meals.  We discussed possible side effects.    She will gradually increase walking. Also try swimming, and other exercises that are easy on her foot. She could also try chair yoga, Pilates, and Ronald Chi. She will decrease fats and sugars in the diet. Weigh at home once per week to monitor progress. Try to lose about 10 pounds over the next 6 months.

## 2023-12-08 PROBLEM — M62.9 NONTRAUMATIC TEAR OF PLANTAR FASCIA: Status: ACTIVE | Noted: 2023-12-08

## 2023-12-08 RX ORDER — METFORMIN HYDROCHLORIDE 500 MG/1
500 TABLET, EXTENDED RELEASE ORAL
Qty: 90 TABLET | Refills: 1 | Status: SHIPPED | OUTPATIENT
Start: 2023-12-08

## 2023-12-08 NOTE — PATIENT INSTRUCTIONS
Patient Instructions  Problem List Items Addressed This Visit          Cardiac and Vasculature    Hypercholesterolemia (Chronic)    Current Assessment & Plan     She will gradually increase walking. Also try swimming or other exercises that are easy on her foot. She could also try chair yoga, Pilates, and Ronald Chi. She will decrease fats and sugars in the diet. Weigh at home once per week to monitor progress. Try to lose about 10 pounds over the next 6 months.         Relevant Orders    CBC & Differential (Completed)    Comprehensive Metabolic Panel (Completed)    Lipid Panel (Completed)    Microalbumin / Creatinine Urine Ratio - Urine, Clean Catch (Completed)    Urinalysis With Microscopic - Urine, Clean Catch (Completed)    TSH (Completed)       Endocrine and Metabolic    Abnormal glucose    Current Assessment & Plan     Increasing exercise and decreasing sugars and white carbohydrates in the diet will help a lot to prevent blood sugar from worsening.         Relevant Orders    Hemoglobin A1c (Completed)    Class 1 obesity due to excess calories with serious comorbidity and body mass index (BMI) of 30.0 to 30.9 in adult    Current Assessment & Plan     She will continue taking metformin daily.  Add phendimetrazine 3 times a day with meals.  We discussed possible side effects.    She will gradually increase walking. Also try swimming, and other exercises that are easy on her foot. She could also try chair yoga, Pilates, and Ronald Chi. She will decrease fats and sugars in the diet. Weigh at home once per week to monitor progress. Try to lose about 10 pounds over the next 6 months.           Relevant Medications    Phendimetrazine Tartrate 35 MG tablet    metFORMIN ER (GLUCOPHAGE-XR) 500 MG 24 hr tablet       Genitourinary and Reproductive     Dyspareunia due to medical condition in female (Chronic)    Overview     See above.         Current Assessment & Plan     We are referring to Dr. Cassidy Rodriguez, Gyn.           Relevant Orders    Ambulatory Referral to Gynecology    Post-menopausal atrophic vaginitis (Chronic)    Overview     Continue estradiol patch weekly and progesterone tablet every evening.    She will try Replens vaginal moisturizer.  We also discussed the possibility of compounding a coconut oil vaginal cream.         Current Assessment & Plan     We are referring to Dr. Cassidy Rodriguez.         Relevant Orders    Ambulatory Referral to Gynecology       Hematology and Neoplasia    Malignant melanoma of face excluding eyelid, nose, lip, and ear (Chronic)    Overview     R cheek excision 2018.         Current Assessment & Plan     She will continue regular follow-up with her dermatologist at least once a year.             Musculoskeletal and Injuries    Nontraumatic tear of plantar fascia    Current Assessment & Plan     She will continue wearing good supportive shoes.  Gradually increase the walking.  Be careful not to overdo it.  Continue wearing the soft brace.  Follow-up with the podiatrist.            Sleep    Primary insomnia    Current Assessment & Plan     We will try half to a whole tablet of trazodone every evening at least an hour or more before she wants to be asleep. Try to get ready for bed at 9 PM in order to get enough time in bed every night.         Relevant Medications    traZODone (DESYREL) 50 MG tablet     Other Visit Diagnoses       Annual physical exam    -  Primary    Colon cancer screening        Relevant Orders    Ambulatory Referral For Screening Colonoscopy    Vitamin D deficiency        Relevant Orders    Vitamin D,25-Hydroxy (Completed)    Osteoporosis screening        Relevant Orders    DEXA Bone Density Axial    Encounter for screening mammogram for breast cancer        Relevant Orders    Mammo Screening Digital Tomosynthesis Bilateral With CAD    Class 1 obesity due to excess calories with serious comorbidity and body mass index (BMI) of 31.0 to 31.9 in adult        Relevant Medications     "metFORMIN ER (GLUCOPHAGE-XR) 500 MG 24 hr tablet            BMI for Adults  What is BMI?  Body mass index (BMI) is a number that is calculated from a person's weight and height. BMI can help estimate how much of a person's weight is composed of fat. BMI does not measure body fat directly. Rather, it is an alternative to procedures that directly measure body fat, which can be difficult and expensive.  BMI can help identify people who may be at higher risk for certain medical problems.  What are BMI measurements used for?  BMI is used as a screening tool to identify possible weight problems. It helps determine whether a person is obese, overweight, a healthy weight, or underweight.  BMI is useful for:  Identifying a weight problem that may be related to a medical condition or may increase the risk for medical problems.  Promoting changes, such as changes in diet and exercise, to help reach a healthy weight. BMI screening can be repeated to see if these changes are working.  How is BMI calculated?  BMI involves measuring your weight in relation to your height. Both height and weight are measured, and the BMI is calculated from those numbers. This can be done either in English ([place] or metric measurements. Note that charts and online BMI calculators are available to help you find your BMI quickly and easily without having to do these calculations yourself.  To calculate your BMI in English ([place] measurements:    Measure your weight in pounds (lb).  Multiply the number of pounds by 703.  For example, for a person who weighs 180 lb, multiply that number by 703, which equals [phone].  Measure your height in inches. Then multiply that number by itself to get a measurement called \"inches squared.\"  For example, for a person who is 70 inches tall, the \"inches squared\" measurement is 70 inches x 70 inches, which equals 4,900 inches squared.  Divide the total from step 2 (number of lb x 703) by the total from step 3 (inches " "squared): 126,540 ÷ 4,900 = 25.8. This is your BMI.  To calculate your BMI in metric measurements:  Measure your weight in kilograms (kg).  Measure your height in meters (m). Then multiply that number by itself to get a measurement called \"meters squared.\"  For example, for a person who is 1.75 m tall, the \"meters squared\" measurement is 1.75 m x 1.75 m, which is equal to 3.1 meters squared.  Divide the number of kilograms (your weight) by the meters squared number. In this example: 70 ÷ 3.1 = 22.6. This is your BMI.  What do the results mean?  BMI charts are used to identify whether you are underweight, normal weight, overweight, or obese. The following guidelines will be used:  Underweight: BMI less than 18.5.  Normal weight: BMI between 18.5 and 24.9.  Overweight: BMI between 25 and 29.9.  Obese: BMI of 30 or above.  Keep these notes in mind:  Weight includes both fat and muscle, so someone with a muscular build, such as an athlete, may have a BMI that is higher than 24.9. In cases like these, BMI is not an accurate measure of body fat.  To determine if excess body fat is the cause of a BMI of 25 or higher, further assessments may need to be done by a health care provider.  BMI is usually interpreted in the same way for men and women.  Where to find more information  For more information about BMI, including tools to quickly calculate your BMI, go to these websites:  Centers for Disease Control and Prevention: www.cdc.gov  American Heart Association: www.heart.org  National Heart, Lung, and Blood [place]: www.nhlbi.nih.gov  Summary  Body mass index (BMI) is a number that is calculated from a person's weight and height.  BMI may help estimate how much of a person's weight is composed of fat. BMI can help identify those who may be at higher risk for certain medical problems.  BMI can be measured using English measurements or metric measurements.  BMI charts are used to identify whether you are underweight, normal " weight, overweight, or obese.  This information is not intended to replace advice given to you by your health care provider. Make sure you discuss any questions you have with your health care provider.  Document Revised: 05/31/2023 Document Reviewed: 07/17/2020  [employer] Patient Education © 2023 [employer]  Exercising to Stay Healthy  To become healthy and stay healthy, it is recommended that you do moderate-intensity and vigorous-intensity exercise. You can tell that you are exercising at a moderate intensity if your heart starts beating faster and you start breathing faster but can still hold a conversation. You can tell that you are exercising at a vigorous intensity if you are breathing much harder and faster and cannot hold a conversation while exercising.  How can exercise benefit me?  Exercising regularly is important. It has many health benefits, such as:  Improving overall fitness, flexibility, and endurance.  Increasing bone density.  Helping with weight control.  Decreasing body fat.  Increasing muscle strength and endurance.  Reducing stress and tension, anxiety, depression, or anger.  Improving overall health.  What guidelines should I follow while exercising?  Before you start a new exercise program, talk with your health care provider.  Do not exercise so much that you hurt yourself, feel dizzy, or get very short of breath.  Wear comfortable clothes and wear shoes with good support.  Drink plenty of water while you exercise to prevent dehydration or heat stroke.  Work out until your breathing and your heartbeat get faster (moderate intensity).  How often should I exercise?  Choose an activity that you enjoy, and set realistic goals. Your health care provider can help you make an activity plan that is individually designed and works best for you.  Exercise regularly as told by your health care provider. This may include:  Doing strength training two times a week, such as:  Lifting weights.  Using  resistance bands.  Push-ups.  Sit-ups.  Yoga.  Doing a certain intensity of exercise for a given amount of time. Choose from these options:  A total of 150 minutes of moderate-intensity exercise every week.  A total of 75 minutes of vigorous-intensity exercise every week.  A mix of moderate-intensity and vigorous-intensity exercise every week.  Children, pregnant women, people who have not exercised regularly, people who are overweight, and older adults may need to talk with a health care provider about what activities are safe to perform. If you have a medical condition, be sure to talk with your health care provider before you start a new exercise program.  What are some exercise ideas?  Moderate-intensity exercise ideas include:  Walking 1 mile (1.6 km) in about 15 minutes.  Biking.  Hiking.  Golfing.  Dancing.  Water aerobics.  Vigorous-intensity exercise ideas include:  Walking 4.5 miles (7.2 km) or more in about 1 hour.  Jogging or running 5 miles (8 km) in about 1 hour.  Biking 10 miles (16.1 km) or more in about 1 hour.  Lap swimming.  Roller-skating or in-line skating.  Cross-country skiing.  Vigorous competitive sports, such as football, basketball, and soccer.  Jumping rope.  Aerobic dancing.  What are some everyday activities that can help me get exercise?  Yard work, such as:  Pushing a .  Raking and bagging leaves.  Washing your car.  Pushing a stroller.  Shoveling snow.  Gardening.  Washing windows or floors.  How can I be more active in my day-to-day activities?  Use stairs instead of an elevator.  Take a walk during your lunch break.  If you drive, park your car farther away from your work or school.  If you take public transportation, get off one stop early and walk the rest of the way.  Stand up or walk around during all of your indoor phone calls.  Get up, stretch, and walk around every 30 minutes throughout the day.  Enjoy exercise with a friend. Support to continue exercising will  help you keep a regular routine of activity.  Where to find more information  You can find more information about exercising to stay healthy from:  [place] Department of Health and Human Services: www.hhs.gov  Centers for Disease Control and Prevention (CDC): www.cdc.gov  Summary  Exercising regularly is important. It will improve your overall fitness, flexibility, and endurance.  Regular exercise will also improve your overall health. It can help you control your weight, reduce stress, and improve your bone density.  Do not exercise so much that you hurt yourself, feel dizzy, or get very short of breath.  Before you start a new exercise program, talk with your health care provider.  This information is not intended to replace advice given to you by your health care provider. Make sure you discuss any questions you have with your health care provider.  Document Revised: 04/15/2022 Document Reviewed: 04/15/2022  [employer] Patient Education © 2023 [employer] Heart-Healthy Eating Plan  Many factors influence your heart (coronary) health, including eating and exercise habits. Coronary risk increases with abnormal blood fat (lipid) levels. Heart-healthy meal planning includes limiting unhealthy fats, increasing healthy fats, and making other diet and lifestyle changes.  What is my plan?  Your health care provider may recommend that you:  Limit your fat intake to _________% or less of your total calories each day.  Limit your saturated fat intake to _________% or less of your total calories each day.  Limit the amount of cholesterol in your diet to less than _________ mg per day.  What are tips for following this plan?  Cooking  Cook foods using methods other than frying. Baking, boiling, grilling, and broiling are all good options. Other ways to reduce fat include:  Removing the skin from poultry.  Removing all visible fats from meats.  Steaming vegetables in water or broth.  Meal planning  A plate with examples of foods  in a healthy diet.      At meals, imagine dividing your plate into fourths:  Fill one-half of your plate with vegetables and green salads.  Fill one-fourth of your plate with whole grains.  Fill one-fourth of your plate with lean protein foods.  Eat 4-5 servings of vegetables per day. One serving equals 1 cup raw or cooked vegetable, or 2 cups raw leafy greens.  Eat 4-5 servings of fruit per day. One serving equals 1 medium whole fruit, ¼ cup dried fruit, ½ cup fresh, frozen, or canned fruit, or ½ cup 100% fruit juice.  Eat more foods that contain soluble fiber. Examples include apples, broccoli, carrots, beans, peas, and barley. Aim to get 25-30 g of fiber per day.  Increase your consumption of legumes, nuts, and seeds to 4-5 servings per week. One serving of dried beans or legumes equals ½ cup cooked, 1 serving of nuts is ¼ cup, and 1 serving of seeds equals 1 tablespoon.  Fats  Choose healthy fats more often. Choose monounsaturated and polyunsaturated fats, such as olive and canola oils, flaxseeds, walnuts, almonds, and seeds.  Eat more omega-3 fats. Choose salmon, mackerel, sardines, tuna, flaxseed oil, and ground flaxseeds. Aim to eat fish at least 2 times each week.  Check food labels carefully to identify foods with trans fats or high amounts of saturated fat.  Limit saturated fats. These are found in animal products, such as meats, butter, and cream. Plant sources of saturated fats include palm oil, palm kernel oil, and coconut oil.  Avoid foods with partially hydrogenated oils in them. These contain trans fats. Examples are stick margarine, some tub margarines, cookies, crackers, and other baked goods.  Avoid fried foods.  General information  Eat more home-cooked food and less restaurant, buffet, and fast food.  Limit or avoid alcohol.  Limit foods that are high in starch and sugar.  Lose weight if you are overweight. Losing just 5-10% of your body weight can help your overall health and prevent diseases  such as diabetes and heart disease.  Monitor your salt (sodium) intake, especially if you have high blood pressure. Talk with your health care provider about your sodium intake.  Try to incorporate more vegetarian meals weekly.  What foods can I eat?  Fruits  All fresh, canned (in natural juice), or frozen fruits.  Vegetables  Fresh or frozen vegetables (raw, steamed, roasted, or grilled). Green salads.  Grains  Most grains. Choose whole wheat and whole grains most of the time. Rice and pasta, including brown rice and pastas made with whole wheat.  Meats and other proteins  Lean, well-trimmed beef, veal, pork, and lamb. Chicken and turkey without skin. All fish and shellfish. Wild duck, rabbit, pheasant, and venison. Egg whites or low-cholesterol egg substitutes. Dried beans, peas, lentils, and tofu. Seeds and most nuts.  Dairy  Low-fat or nonfat cheeses, including ricotta and mozzarella. Skim or 1% milk (liquid, powdered, or evaporated). Buttermilk made with low-fat milk. Nonfat or low-fat yogurt.  Fats and oils  Non-hydrogenated (trans-free) margarines. Vegetable oils, including soybean, sesame, sunflower, olive, peanut, safflower, corn, canola, and cottonseed. Salad dressings or mayonnaise made with a vegetable oil.  Beverages  Water (mineral or sparkling). Coffee and tea. Diet carbonated beverages.  Sweets and desserts  Sherbet, gelatin, and fruit ice. Small amounts of dark chocolate.  Limit all sweets and desserts.  Seasonings and condiments  All seasonings and condiments.  The items listed above may not be a complete list of foods and beverages you can eat. Contact a dietitian for more options.  What foods are not recommended?  Fruits  Canned fruit in heavy syrup. Fruit in cream or butter sauce. Fried fruit. Limit coconut.  Vegetables  Vegetables cooked in cheese, cream, or butter sauce. Fried vegetables.  Grains  Breads made with saturated or trans fats, oils, or whole milk. Croissants. Sweet rolls. Donuts.  High-fat crackers, such as cheese crackers.  Meats and other proteins  Fatty meats, such as hot dogs, ribs, sausage, izquierdo, rib-eye roast or steak. High-fat deli meats, such as salami and bologna. Caviar. Domestic duck and goose. Organ meats, such as liver.  Dairy  Cream, sour cream, cream cheese, and creamed cottage cheese. Whole-milk cheeses. Whole or 2% milk (liquid, evaporated, or condensed). Whole buttermilk. Cream sauce or high-fat cheese sauce. Whole-milk yogurt.  Fats and oils  Meat fat, or shortening. Cocoa butter, hydrogenated oils, palm oil, coconut oil, palm kernel oil. Solid fats and shortenings, including izquierdo fat, salt pork, lard, and butter. Nondairy cream substitutes. Salad dressings with cheese or sour cream.  Beverages  Regular sodas and any drinks with added sugar.  Sweets and desserts  Frosting. Pudding. Cookies. Cakes. Pies. Milk chocolate or white chocolate. Buttered syrups. Full-fat ice cream or ice cream drinks.  The items listed above may not be a complete list of foods and beverages to avoid. Contact a dietitian for more information.  Summary  Heart-healthy meal planning includes limiting unhealthy fats, increasing healthy fats, and making other diet and lifestyle changes.  Lose weight if you are overweight. Losing just 5-10% of your body weight can help your overall health and prevent diseases such as diabetes and heart disease.  Focus on eating a balance of foods, including fruits and vegetables, low-fat or nonfat dairy, lean protein, nuts and legumes, whole grains, and heart-healthy oils and fats.  This information is not intended to replace advice given to you by your health care provider. Make sure you discuss any questions you have with your health care provider.  Document Revised: 04/28/2022 Document Reviewed: 04/28/2022  [employer] Patient Education © 2022 [employer]

## 2023-12-08 NOTE — ASSESSMENT & PLAN NOTE
She will continue wearing good supportive shoes.  Gradually increase the walking.  Be careful not to overdo it.  Continue wearing the soft brace.  Follow-up with the podiatrist.

## 2023-12-27 RX ORDER — OMEPRAZOLE 40 MG/1
40 CAPSULE, DELAYED RELEASE ORAL DAILY
Qty: 90 CAPSULE | Refills: 1 | OUTPATIENT
Start: 2023-12-27

## 2024-01-03 RX ORDER — DEXTROMETHORPHAN HYDROBROMIDE AND PROMETHAZINE HYDROCHLORIDE 15; 6.25 MG/5ML; MG/5ML
5 SYRUP ORAL 4 TIMES DAILY PRN
Qty: 180 ML | Refills: 0 | Status: SHIPPED | OUTPATIENT
Start: 2024-01-03

## 2024-01-08 ENCOUNTER — HOSPITAL ENCOUNTER (OUTPATIENT)
Dept: BONE DENSITY | Facility: HOSPITAL | Age: 54
Discharge: HOME | End: 2024-01-08
Admitting: INTERNAL MEDICINE
Payer: COMMERCIAL

## 2024-01-08 DIAGNOSIS — Z13.820 OSTEOPOROSIS SCREENING: ICD-10-CM

## 2024-01-08 PROCEDURE — 77080 DXA BONE DENSITY AXIAL: CPT

## 2024-01-24 ENCOUNTER — OUTSIDE FACILITY SERVICE (OUTPATIENT)
Dept: GASTROENTEROLOGY | Facility: CLINIC | Age: 54
End: 2024-01-24
Payer: COMMERCIAL

## 2024-01-24 PROCEDURE — 45380 COLONOSCOPY AND BIOPSY: CPT | Performed by: INTERNAL MEDICINE

## 2024-01-24 PROCEDURE — 88305 TISSUE EXAM BY PATHOLOGIST: CPT | Performed by: INTERNAL MEDICINE

## 2024-01-25 ENCOUNTER — LAB REQUISITION (OUTPATIENT)
Dept: LAB | Facility: HOSPITAL | Age: 54
End: 2024-01-25
Payer: COMMERCIAL

## 2024-01-25 DIAGNOSIS — D12.3 BENIGN NEOPLASM OF TRANSVERSE COLON: ICD-10-CM

## 2024-01-25 DIAGNOSIS — Z80.0 FAMILY HISTORY OF MALIGNANT NEOPLASM OF DIGESTIVE ORGANS: ICD-10-CM

## 2024-01-25 DIAGNOSIS — K64.8 OTHER HEMORRHOIDS: ICD-10-CM

## 2024-01-25 DIAGNOSIS — K57.30 DIVERTICULOSIS OF LARGE INTESTINE WITHOUT PERFORATION OR ABSCESS WITHOUT BLEEDING: ICD-10-CM

## 2024-01-25 DIAGNOSIS — Z12.11 ENCOUNTER FOR SCREENING FOR MALIGNANT NEOPLASM OF COLON: ICD-10-CM

## 2024-01-26 LAB — REF LAB TEST METHOD: NORMAL

## 2024-02-01 DIAGNOSIS — G89.29 CHRONIC LEFT SHOULDER PAIN: Primary | ICD-10-CM

## 2024-02-01 DIAGNOSIS — M25.512 CHRONIC LEFT SHOULDER PAIN: Primary | ICD-10-CM

## 2024-02-02 ENCOUNTER — OFFICE VISIT (OUTPATIENT)
Dept: ORTHOPEDIC SURGERY | Facility: CLINIC | Age: 54
End: 2024-02-02
Payer: COMMERCIAL

## 2024-02-02 VITALS
WEIGHT: 193 LBS | SYSTOLIC BLOOD PRESSURE: 122 MMHG | HEIGHT: 66 IN | BODY MASS INDEX: 31.02 KG/M2 | DIASTOLIC BLOOD PRESSURE: 78 MMHG

## 2024-02-02 DIAGNOSIS — M75.42 IMPINGEMENT SYNDROME OF LEFT SHOULDER: ICD-10-CM

## 2024-02-02 DIAGNOSIS — M25.512 LEFT SHOULDER PAIN, UNSPECIFIED CHRONICITY: ICD-10-CM

## 2024-02-02 DIAGNOSIS — S46.002A INJURY OF LEFT ROTATOR CUFF, INITIAL ENCOUNTER: Primary | ICD-10-CM

## 2024-02-02 DIAGNOSIS — M75.52 BURSITIS OF LEFT SHOULDER: ICD-10-CM

## 2024-02-02 RX ORDER — METHOCARBAMOL 500 MG/1
500 TABLET, FILM COATED ORAL 3 TIMES DAILY PRN
Qty: 40 TABLET | Refills: 1 | Status: SHIPPED | OUTPATIENT
Start: 2024-02-02 | End: 2024-02-16

## 2024-02-02 RX ORDER — METHYLPREDNISOLONE 4 MG/1
TABLET ORAL
Qty: 1 EACH | Refills: 0 | Status: SHIPPED | OUTPATIENT
Start: 2024-02-02

## 2024-02-02 NOTE — PROGRESS NOTES
"                                                                    [facility] Orthopaedic Surgery Office Visit - Simba Serrano MD    Office Visit       Patient Name: Laine Hernandez    Chief Complaint:   Chief Complaint   Patient presents with    Left Shoulder - Pain       Referring Physician: Carina Chan MD  - I appreciate the referral      History of Present Illness:   Laine Hernandez is a 53 y.o. female who presents with left body part: shoulder Reason: pain.  Onset:Onset: atraumatic and gradual in nature. The issue has been ongoing for 1 month(s). Pain is a 5/10 on the pain scale. Pain is described as Pain Characterization: aching and throbbing. Associated symptoms include Symptoms: pain and stiffness. The pain is worse with sleeping and lying on affected side; nothing improves the pain. Previous treatments have included: NSAIDS, weight loss, and steroid injection (last injection 2018). I have reviewed the patient's history of present illness as noted/entered above.    I have reviewed the patient's past medical history, surgical history, social history, family history, medications, and allergies as noted in the electronic medical record and as noted/entered.  I have reviewed the patient's review of systems as noted/enter and updated as noted in the patient's HPI.      LEFT SHOULDER PAIN  Prior history of MRI (no known tears) and shoulder injection LEFT shoulder 2018 with excellent relief until January 2024    Treated with prior injection, NSAIDs (Advil)    Metformin  Palpitations; Cardiology - Dr. 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 female  Body mass index is 31.15 kg/m².    Subjective   Subjective      Review of Systems   Constitutional: Negative.  Negative for chills, fatigue and fever.   HENT: Negative.  Negative " for congestion and dental problem.    Eyes: Negative.  Negative for blurred vision.   Respiratory: Negative.  Negative for shortness of breath.    Cardiovascular: Negative.  Negative for leg swelling.   Gastrointestinal: Negative.  Negative for abdominal pain.   Endocrine: Negative.  Negative for polyuria.   Genitourinary: Negative.  Negative for difficulty urinating.   Musculoskeletal:  Positive for arthralgias.   Skin: Negative.    Allergic/Immunologic: Negative.    Neurological: Negative.    Hematological: Negative.  Negative for adenopathy.   Psychiatric/Behavioral: Negative.  Negative for behavioral problems.         Past Medical History:   Past Medical History:   Diagnosis Date    Allergic 2005    The usual pollen allergies, plus sulfa antibiotics and latex    Ankle sprain     Arthritis     Cancer 2019    On face - removed in 2019    Cataract 2019    Class 1 obesity due to excess calories with serious comorbidity and body mass index (BMI) of 31.0 to 31.9 in adult 06/21/2022    Depression     Diverticulosis 2011    Just one episode, none since    Fracture, foot 2007    Generalized anxiety disorder     GERD (gastroesophageal reflux disease)     Headache     Hip arthrosis July 2020    Menorrhalgia 2012    uterine ablation performed    Neuroma of foot 2013    Rotator cuff syndrome 2012    Tendinitis of knee 1996    I tried running for daily exercise. My knees said no.    Tennis elbow 2011    From walking strong dogs       Past Surgical History:   Past Surgical History:   Procedure Laterality Date    BREAST SURGERY  2002    augmentation     BUNIONECTOMY      2014 and 2015    ENDOMETRIAL ABLATION  2005    EYE SURGERY  2000    Lasik    FRACTURE SURGERY  2014 and 2015    Not fractures but bunionectomies    SKIN CANCER EXCISION  2018    R cheek melanoma       Family History:   Family History   Problem Relation Age of Onset    Arthritis Mother     Colon cancer Mother 81    Heart attack Mother 84        cause of death     COPD Mother     Alcohol abuse Mother     Anxiety disorder Mother     Cancer Mother         Colon Cancer    Heart disease Mother     Miscarriages / Stillbirths Mother         Stillbirth    Arthritis Father     Diabetes Father         oral med    Hyperlipidemia Father     Hypertension Father     Stroke Father 76    Obesity Father     Meniere's disease Father     Heart attack Father     Alcohol abuse Father     Hearing loss Father     Heart disease Father     Other Sister         Meniere's    Meniere's disease Sister     Hearing loss Sister     Sleep apnea Brother     Diabetes Paternal Grandfather     Miscarriages / Stillbirths Sister         Stillbirth       Social History:   Social History     Socioeconomic History    Marital status:    Tobacco Use    Smoking status: Never     Passive exposure: Past    Smokeless tobacco: Never    Tobacco comments:     Both parents smoked when I was growing up   Vaping Use    Vaping Use: Never used   Substance and Sexual Activity    Alcohol use: Yes     Alcohol/week: 4.0 standard drinks of alcohol     Types: 2 Glasses of wine, 2 Drinks containing 0.5 oz of alcohol per week     Comment: 1 aweek    Drug use: Never    Sexual activity: Yes     Partners: Male     Birth control/protection: Vasectomy     Comment: My vaginal atrophy is so bad, intercourse is not possible.       Medications:   Current Outpatient Medications:     Ascorbic Acid (VITAMIN C PO), Take  by mouth Daily., Disp: , Rfl:     Calcium Carb-Cholecalciferol (CALCIUM 500/VITAMIN D PO), Take  by mouth. 2 gummies daily, Disp: , Rfl:     Cholecalciferol (VITAMIN D3 PO), Take 1,000 Units by mouth 2 (Two) Times a Day. Not in summer months, Disp: , Rfl:     diclofenac (VOLTAREN) 50 MG EC tablet, TAKE ONE TABLET BY MOUTH TWICE A DAY AS NEEDED FOR PAIN, Disp: 60 tablet, Rfl: 5    fexofenadine (ALLEGRA) 180 MG tablet, Take 1 tablet by mouth Daily As Needed (allergies)., Disp: , Rfl:     Lactobacillus (ACIDOPHILUS PO), Take  by  "mouth Daily., Disp: , Rfl:     magnesium, as, gluconate (MAGONATE) 500 (27 Mg) MG tablet, Take 1 tablet by mouth Daily., Disp: 90 tablet, Rfl: 1    meclizine (ANTIVERT) 12.5 MG tablet, As Needed., Disp: , Rfl:     metFORMIN ER (GLUCOPHAGE-XR) 500 MG 24 hr tablet, Take 1 tablet by mouth Daily With Breakfast., Disp: 90 tablet, Rfl: 1    methocarbamol (ROBAXIN) 500 MG tablet, Take 1 tablet by mouth 3 (Three) Times a Day As Needed for Muscle Spasms for up to 14 days., Disp: 40 tablet, Rfl: 1    methylPREDNISolone (MEDROL) 4 MG dose pack, Use as directed by package instructions, Disp: 1 each, Rfl: 0    multivitamin with minerals tablet tablet, Take 1 tablet by mouth Daily., Disp: , Rfl:     niacin 500 MG tablet, Take 3 tablets by mouth Daily., Disp: , Rfl:     ondansetron ODT (ZOFRAN-ODT) 4 MG disintegrating tablet, Place 1 tablet on the tongue Every 12 (Twelve) Hours As Needed for Nausea. (Patient not taking: Reported on 12/7/2023), Disp: 30 tablet, Rfl: 0    Phendimetrazine Tartrate 35 MG tablet, Take 35 mg by mouth 3 times a day., Disp: 90 each, Rfl: 2    Sodium Hyaluronate, oral, (HYALURONIC ACID PO), Take  by mouth Daily., Disp: , Rfl:     traZODone (DESYREL) 50 MG tablet, Take 1 tablet by mouth Every Night., Disp: 30 tablet, Rfl: 5    Allergies:   Allergies   Allergen Reactions    Sulfa Antibiotics Other (See Comments)     Per pt - swollen lymph nodes       The following portions of the patient's history were reviewed and updated as appropriate: allergies, current medications, past family history, past medical history, past social history, past surgical history and problem list.        Objective    Objective      Vital Signs:   Vitals:    02/02/24 1141   BP: 122/78   Weight: 87.5 kg (193 lb)   Height: 167.6 cm (66\")       Ortho Exam:  LEFT SHOULDER  General: no acute distress, comfortable  Vitals reviewed in chart    Musculoskeletal Exam    SIDE: LEFT SHOULDER  Shoulder Exam:    Tenderness: rotator cuff    Range of " motion measurements (degrees)  Forward flexion/Abduction/External rotation at side/ER at 90/IR at 90/IR position  Active: pain limited 140/140/50/80/80  Passive: same    Painful arc of motion: yes  No evidence of septic joint  Pain with forward flexion and abduction greater: 90 degrees  Impingement testing Neer's test - positive/painful  Impingement testing Hawkin's test - positive/painful    Rotator Cuff Testing:  Tenderness to palpation at rotator cuff - YES  Rotator cuff testing Blake's test - positive  Rotator cuff testing External rotation - no  Rotator cuff testing Lag signs - no  Rotator cuff testing Belly press - no  Pain with abduction great than 90 degrees - yes    Scapular dyskinesis - present, abnormal scapular motion    Long head of the biceps testing:  Joshi's test for biceps & Speed's test - pain  Bicipital groove tenderness to palpation/tenderness to palpation of biceps tendon - pain    Pain and weakness overhead, particularly with Blake's testing  No skin changes      Results Review:   Imaging Results (Last 24 Hours)       Procedure Component Value Units Date/Time    XR Shoulder 2+ View Left [[id number]] Resulted: 02/02/24 1214     Updated: 02/02/24 1216    Narrative:      Imaging: shoulder x-rays 2 views - AP and axillary x-ray views    Side: LEFT SHOULDER    Indication for shoulder x-ray 2 views: shoulder pain    Comparison: no comparison views available    Findings: No acute bony pathology. No superior humeral head migration.    The humeral head remains centered in the glenohumeral joint. No evidence   of calcific tendonitis.  Chronic subchondral changes to greater tuberosity   and acromion.      I personally reviewed the above x-rays.            Procedures             Assessment / Plan      Assessment/Plan:   Problem List Items Addressed This Visit          Musculoskeletal and Injuries    Injury of left rotator cuff - Primary    Relevant Medications    methylPREDNISolone (MEDROL) 4 MG dose pack     methocarbamol (ROBAXIN) 500 MG tablet    Other Relevant Orders    MRI Shoulder Left Without Contrast    Impingement syndrome of left shoulder    Relevant Medications    methylPREDNISolone (MEDROL) 4 MG dose pack    methocarbamol (ROBAXIN) 500 MG tablet    Other Relevant Orders    MRI Shoulder Left Without Contrast    Bursitis of left shoulder    Relevant Medications    methylPREDNISolone (MEDROL) 4 MG dose pack    methocarbamol (ROBAXIN) 500 MG tablet    Other Relevant Orders    MRI Shoulder Left Without Contrast    Left shoulder pain    Relevant Medications    methylPREDNISolone (MEDROL) 4 MG dose pack    methocarbamol (ROBAXIN) 500 MG tablet    Other Relevant Orders    XR Shoulder 2+ View Left (Completed)    MRI Shoulder Left Without Contrast       LEFT SHOULDER  MRI of the shoulder is recommended.  Indication: suspected rotator cuff tear  The MRI is critical to evaluate for rotator cuff tearing and will help with possible surgical planning.  Pain and weakness left shoulder, degenerative changes on xrays  Concern for rotator cuff tearing, left-hand dominant, weakness overhead    Follow Up: AFTER LEFT SHOULDER MRI        Simba Serrano MD, FAAOS  Orthopedic Surgeon  Fellowship Trained Shoulder and Elbow Surgeon  [facility]  Orthopedics and Sports Medicine  [address] [place] [address], [address]  [place] [address]    02/02/24  12:17 EST

## 2024-02-05 ENCOUNTER — HOSPITAL ENCOUNTER (OUTPATIENT)
Dept: MAMMOGRAPHY | Facility: HOSPITAL | Age: 54
Discharge: HOME | End: 2024-02-05
Admitting: INTERNAL MEDICINE
Payer: COMMERCIAL

## 2024-02-05 DIAGNOSIS — Z12.31 ENCOUNTER FOR SCREENING MAMMOGRAM FOR BREAST CANCER: ICD-10-CM

## 2024-02-05 PROCEDURE — 77063 BREAST TOMOSYNTHESIS BI: CPT

## 2024-02-05 PROCEDURE — 77067 SCR MAMMO BI INCL CAD: CPT

## 2024-02-06 PROCEDURE — 77063 BREAST TOMOSYNTHESIS BI: CPT | Performed by: RADIOLOGY

## 2024-02-06 PROCEDURE — 77067 SCR MAMMO BI INCL CAD: CPT | Performed by: RADIOLOGY

## 2024-02-13 ENCOUNTER — OFFICE VISIT (OUTPATIENT)
Dept: OBSTETRICS AND GYNECOLOGY | Facility: CLINIC | Age: 54
End: 2024-02-13
Payer: COMMERCIAL

## 2024-02-13 VITALS
HEIGHT: 66 IN | DIASTOLIC BLOOD PRESSURE: 84 MMHG | BODY MASS INDEX: 31.5 KG/M2 | WEIGHT: 196 LBS | SYSTOLIC BLOOD PRESSURE: 124 MMHG

## 2024-02-13 DIAGNOSIS — Z12.39 ENCOUNTER FOR BREAST CANCER SCREENING USING NON-MAMMOGRAM MODALITY: ICD-10-CM

## 2024-02-13 DIAGNOSIS — N95.2 ATROPHIC VAGINITIS: ICD-10-CM

## 2024-02-13 DIAGNOSIS — Z78.0 POSTMENOPAUSAL STATUS: ICD-10-CM

## 2024-02-13 DIAGNOSIS — N95.1 MENOPAUSAL SYMPTOMS: ICD-10-CM

## 2024-02-13 DIAGNOSIS — Z01.419 WOMEN'S ANNUAL ROUTINE GYNECOLOGICAL EXAMINATION: Primary | ICD-10-CM

## 2024-02-13 RX ORDER — ESTRADIOL 10 UG/1
1 INSERT VAGINAL DAILY
Qty: 18 TABLET | Refills: 0 | Status: SHIPPED | OUTPATIENT
Start: 2024-02-13 | End: 2024-03-02

## 2024-02-13 RX ORDER — CLINDAMYCIN PHOSPHATE 11.9 MG/ML
SOLUTION TOPICAL
COMMUNITY
Start: 2024-02-08

## 2024-02-13 RX ORDER — ESTRADIOL 10 UG/1
1 INSERT VAGINAL 2 TIMES WEEKLY
Qty: 8 TABLET | Refills: 11 | Status: SHIPPED | OUTPATIENT
Start: 2024-02-15 | End: 2025-01-16

## 2024-02-13 RX ORDER — MINOXIDIL 10 MG/1
TABLET ORAL
COMMUNITY
Start: 2024-02-08

## 2024-02-15 LAB — REF LAB TEST METHOD: NORMAL

## 2024-02-16 ENCOUNTER — TELEPHONE (OUTPATIENT)
Dept: OBSTETRICS AND GYNECOLOGY | Facility: CLINIC | Age: 54
End: 2024-02-16
Payer: COMMERCIAL

## 2024-02-18 ENCOUNTER — PATIENT MESSAGE (OUTPATIENT)
Dept: OBSTETRICS AND GYNECOLOGY | Facility: CLINIC | Age: 54
End: 2024-02-18
Payer: COMMERCIAL

## 2024-02-19 NOTE — TELEPHONE ENCOUNTER
I called the pharmacy and they do not understand what happened.     I had already talk to the pharmacy last week and clarify the sig to run #18 for 28 not #18 for 18 on the starter dose and to put the maintenance rx  #8/28 back. Now that she has already picked up the 8 for 10.00 they need some time to figure out what to do. I was unable to reach the pt.

## 2024-02-19 NOTE — TELEPHONE ENCOUNTER
From: Laine Hernandez  To: Lisa Urbina  Sent: 2/18/2024 11:15 PM EST  Subject: Vaginal estradiol script    Hi Dr. Urbina! When we discussed the vaginal hormone option, I thought you said I'd use it every night for 2 weeks, then go to 2x/week for the next 5 months.   The prescription from the pharmacy is only for 2x week though. Is it an insurance thing? Or can you write another so I have enough for the beginning 14 days?    Thanks!    Laine Hernandez  [phone]

## 2024-02-21 ENCOUNTER — HOSPITAL ENCOUNTER (OUTPATIENT)
Dept: MRI IMAGING | Facility: HOSPITAL | Age: 54
Discharge: HOME | End: 2024-02-21
Admitting: ORTHOPAEDIC SURGERY
Payer: COMMERCIAL

## 2024-02-21 DIAGNOSIS — M75.42 IMPINGEMENT SYNDROME OF LEFT SHOULDER: ICD-10-CM

## 2024-02-21 DIAGNOSIS — S46.002A INJURY OF LEFT ROTATOR CUFF, INITIAL ENCOUNTER: ICD-10-CM

## 2024-02-21 DIAGNOSIS — M25.512 LEFT SHOULDER PAIN, UNSPECIFIED CHRONICITY: ICD-10-CM

## 2024-02-21 DIAGNOSIS — M75.52 BURSITIS OF LEFT SHOULDER: ICD-10-CM

## 2024-02-21 PROCEDURE — 73221 MRI JOINT UPR EXTREM W/O DYE: CPT

## 2024-02-27 ENCOUNTER — OFFICE VISIT (OUTPATIENT)
Dept: ORTHOPEDIC SURGERY | Facility: CLINIC | Age: 54
End: 2024-02-27
Payer: COMMERCIAL

## 2024-02-27 VITALS
WEIGHT: 195.99 LBS | HEIGHT: 66 IN | SYSTOLIC BLOOD PRESSURE: 120 MMHG | DIASTOLIC BLOOD PRESSURE: 80 MMHG | BODY MASS INDEX: 31.5 KG/M2

## 2024-02-27 DIAGNOSIS — M75.52 BURSITIS OF LEFT SHOULDER: ICD-10-CM

## 2024-02-27 DIAGNOSIS — M25.512 LEFT SHOULDER PAIN, UNSPECIFIED CHRONICITY: ICD-10-CM

## 2024-02-27 DIAGNOSIS — M75.42 IMPINGEMENT SYNDROME OF LEFT SHOULDER: ICD-10-CM

## 2024-02-27 DIAGNOSIS — S43.432A SUPERIOR GLENOID LABRUM LESION OF LEFT SHOULDER, INITIAL ENCOUNTER: ICD-10-CM

## 2024-02-27 DIAGNOSIS — M75.122 NONTRAUMATIC COMPLETE TEAR OF LEFT ROTATOR CUFF: Primary | ICD-10-CM

## 2024-02-27 DIAGNOSIS — M75.22 BICEPS TENDINITIS OF LEFT UPPER EXTREMITY: ICD-10-CM

## 2024-02-27 DIAGNOSIS — S46.002A INJURY OF LEFT ROTATOR CUFF, INITIAL ENCOUNTER: ICD-10-CM

## 2024-02-27 RX ORDER — TRIAMCINOLONE ACETONIDE 40 MG/ML
40 INJECTION, SUSPENSION INTRA-ARTICULAR; INTRAMUSCULAR
Status: COMPLETED | OUTPATIENT
Start: 2024-02-27 | End: 2024-02-27

## 2024-02-27 RX ORDER — LIDOCAINE HYDROCHLORIDE 10 MG/ML
5 INJECTION, SOLUTION EPIDURAL; INFILTRATION; INTRACAUDAL; PERINEURAL
Status: COMPLETED | OUTPATIENT
Start: 2024-02-27 | End: 2024-02-27

## 2024-02-27 RX ADMIN — TRIAMCINOLONE ACETONIDE 40 MG: 40 INJECTION, SUSPENSION INTRA-ARTICULAR; INTRAMUSCULAR at 11:36

## 2024-02-27 RX ADMIN — LIDOCAINE HYDROCHLORIDE 5 ML: 10 INJECTION, SOLUTION EPIDURAL; INFILTRATION; INTRACAUDAL; PERINEURAL at 11:36

## 2024-02-27 NOTE — PROGRESS NOTES
"                                                                [facility] Orthopaedic Surgery Office Follow Up       Office Follow Up Visit       Patient Name: Laine Hernandez    Chief Complaint:   Chief Complaint   Patient presents with   • Follow-up     3.5 week (MRI) follow-up: Injury of left rotator cuff, subsequent encounter       Referring Physician: No ref. provider found    History of Present Illness:   It has been 3  week(s) since Laine Hernandez's last visit. Laine Hernandez returns to clinic today for F/U: follow-up of leftBody Part: shoulderReason: pain. The issue has been ongoing for 2 month(s). Laine Hernandez rates HIS/HER: herpain at 6/10 on the pain scale. Previous/current treatments: NSAIDS, weight loss, oral steroids, and steroid injection (last injection 2018). Current symptoms:Symptoms: pain and stiffness. The pain is worse with sleeping and lying on affected side; rest improves the pain. Overall, he/she: sheis doing the same. I have reviewed the patient's history of present illness as noted/entered above.    I have reviewed the patient's past medical history, surgical history, social history, family history, medications, and allergies as noted in the electronic medical record and as noted/entered.  I have reviewed the patient's review of systems as noted/enter and updated as noted in the patient's HPI.      LEFT SHOULDER PAIN  Prior history of MRI (no known tears) and shoulder injection LEFT shoulder 2018 (upated to 2019) with excellent relief until January 2024     Treated with prior injection, NSAIDs (Advil)     Metformin  Palpitations; Cardiology - Dr. Linda Persaud     Self-employed - antiques, designs Halloween decorations, restoration  Played softball in the past and felt like shoulder pain started at that time  LHD     Pain and weakness     \"Crow-ker\"        53 y.o. female  Body mass index is 31.15 kg/m².      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  The general recommendation for somebody otherwise young and healthy will proceed with arthroscopic rotator cuff repair possible biceps tenodesis given full-thickness rotator cuff tearing.  She has significant night pain.  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  She would like to hold off and wait till the fall or closer toward winter. Counseled on potential tear progression/atrophy/fatty infiltration over time. She understands that tear progression can occur over time.  She understands that we would typically only recommend 1 injection at the most.  She was definitely in favor of injection and holding off on surgery at this time.  Physical therapy also recommended in the interim.  I will see her back in 2 to 3 months for recheck.  She will monitor pain as pain can be as symptom of tear progression.      Subjective   Subjective      Review of Systems   Constitutional: Negative.  Negative for chills, fatigue and fever.   HENT: Negative.  Negative for congestion and dental problem.    Eyes: Negative.  Negative for blurred vision.   Respiratory: Negative.  Negative for shortness of breath.    Cardiovascular: Negative.  Negative for leg swelling.   Gastrointestinal: Negative.  Negative for abdominal pain.   Endocrine: Negative.  Negative for polyuria.   Genitourinary: Negative.  Negative for difficulty urinating.   Musculoskeletal:  Positive for arthralgias.   Skin: Negative.    Allergic/Immunologic: Negative.    Neurological:  Negative.    Hematological: Negative.  Negative for adenopathy.   Psychiatric/Behavioral: Negative.  Negative for behavioral problems.         Past Medical History:   Past Medical History:   Diagnosis Date   • Abnormal Pap smear of cervix 2003   • Allergic 2005    The usual pollen allergies, plus sulfa antibiotics and latex   • Ankle sprain    • Arthritis    • Cancer 2019    On face - removed in 2019   • Cataract 2019   • Cervical dysplasia 2003   • Class 1 obesity due to excess calories with serious comorbidity and body mass index (BMI) of 31.0 to 31.9 in adult 06/21/2022   • Depression    • Diverticulosis 2011    Just one episode, none since   • Fracture, foot 2007   • GERD (gastroesophageal reflux disease)    • Headache    • Hip arthrosis July 2020   • HPV (human papilloma virus) infection 2003   • Hyperlipidemia 2023   • Menorrhalgia 2012    uterine ablation performed   • Migraine 2009    I get ~6 migraines/year that last 24-48 hrs   • Neuroma of foot 2013   • PMS (premenstrual syndrome) 1986   • Rotator cuff syndrome 2012   • Tendinitis of knee 1996    I tried running for daily exercise. My knees said no.   • Tennis elbow 2011    From walking strong dogs   • Varicella 1985       Past Surgical History:   Past Surgical History:   Procedure Laterality Date   • AUGMENTATION MAMMAPLASTY Bilateral 2002    SALINE   • BREAST SURGERY  2002    augmentation    • BUNIONECTOMY      2014 and 2015   • ENDOMETRIAL ABLATION  2005   • EYE SURGERY  2000    Lasik   • FRACTURE SURGERY  2014 and 2015    Not fractures but bunionectomies   • SKIN CANCER EXCISION  2018    R cheek melanoma       Family History:   Family History   Problem Relation Age of Onset   • Arthritis Father    • Diabetes Father         oral med   • Hyperlipidemia Father    • Hypertension Father    • Stroke Father 76   • Obesity Father    • Meniere's disease Father    • Heart attack Father    • Alcohol abuse Father    • Hearing loss Father    • Heart disease Father     • Arthritis Mother    • Colon cancer Mother 81   • Heart attack Mother 84        cause of death   • COPD Mother    • Alcohol abuse Mother    • Anxiety disorder Mother    • Cancer Mother         Colon Cancer   • Heart disease Mother    • Miscarriages / Stillbirths Mother         Stillbirth   • Sleep apnea Brother    • Other Sister         Meniere's   • Meniere's disease Sister    • Hearing loss Sister    • Miscarriages / Stillbirths Sister         Stillbirth   • Diabetes Paternal Grandfather    • Breast cancer Neg Hx    • Ovarian cancer Neg Hx    • Uterine cancer Neg Hx        Social History:   Social History     Socioeconomic History   • Marital status:    Tobacco Use   • Smoking status: Never     Passive exposure: Past   • Smokeless tobacco: Never   • Tobacco comments:     Both parents smoked when I was growing up   Vaping Use   • Vaping Use: Never used   Substance and Sexual Activity   • Alcohol use: Yes     Alcohol/week: 3.0 standard drinks of alcohol     Types: 1 Glasses of wine, 2 Drinks containing 0.5 oz of alcohol per week     Comment: 1 aweek   • Drug use: Never   • Sexual activity: Yes     Partners: Male     Birth control/protection: Vasectomy     Comment: My vaginal atrophy is so bad, intercourse is not possible.       Medications:   Current Outpatient Medications:   •  Ascorbic Acid (VITAMIN C PO), Take  by mouth Daily., Disp: , Rfl:   •  Calcium Carb-Cholecalciferol (CALCIUM 500/VITAMIN D PO), Take  by mouth. 2 gummies daily, Disp: , Rfl:   •  Cholecalciferol (VITAMIN D3 PO), Take 1,000 Units by mouth 2 (Two) Times a Day. Not in summer months, Disp: , Rfl:   •  clindamycin (CLEOCIN T) 1 % external solution, , Disp: , Rfl:   •  diclofenac (VOLTAREN) 50 MG EC tablet, TAKE ONE TABLET BY MOUTH TWICE A DAY AS NEEDED FOR PAIN, Disp: 60 tablet, Rfl: 5  •  estradiol (Vagifem) 10 MCG tablet vaginal tablet, Insert 1 tablet into the vagina Daily for 18 days. Take daily for two weeks then twice a week  thereafter.  Refills of 8 after initial rx., Disp: 18 tablet, Rfl: 0  •  estradiol (Vagifem) 10 MCG tablet vaginal tablet, Insert 1 tablet into the vagina 2 (Two) Times a Week for 336 days., Disp: 8 tablet, Rfl: 11  •  fexofenadine (ALLEGRA) 180 MG tablet, Take 1 tablet by mouth Daily As Needed (allergies)., Disp: , Rfl:   •  Lactobacillus (ACIDOPHILUS PO), Take  by mouth Daily., Disp: , Rfl:   •  magnesium, as, gluconate (MAGONATE) 500 (27 Mg) MG tablet, Take 1 tablet by mouth Daily., Disp: 90 tablet, Rfl: 1  •  meclizine (ANTIVERT) 12.5 MG tablet, As Needed., Disp: , Rfl:   •  metFORMIN ER (GLUCOPHAGE-XR) 500 MG 24 hr tablet, Take 1 tablet by mouth Daily With Breakfast., Disp: 90 tablet, Rfl: 1  •  minoxidil (LONITEN) 10 MG tablet, , Disp: , Rfl:   •  multivitamin with minerals tablet tablet, Take 1 tablet by mouth Daily., Disp: , Rfl:   •  niacin 500 MG tablet, Take 3 tablets by mouth Daily., Disp: , Rfl:   •  NON FORMULARY, Wild yam root capsules, Disp: , Rfl:   •  ondansetron ODT (ZOFRAN-ODT) 4 MG disintegrating tablet, Place 1 tablet on the tongue Every 12 (Twelve) Hours As Needed for Nausea., Disp: 30 tablet, Rfl: 0  •  Phendimetrazine Tartrate 35 MG tablet, Take 35 mg by mouth 3 times a day., Disp: 90 each, Rfl: 2  •  Sodium Hyaluronate, oral, (HYALURONIC ACID PO), Take  by mouth Daily., Disp: , Rfl:   •  traZODone (DESYREL) 50 MG tablet, Take 1 tablet by mouth Every Night. (Patient taking differently: Take 1 tablet by mouth Every Night. As needed), Disp: 30 tablet, Rfl: 5    Allergies:   Allergies   Allergen Reactions   • Latex Itching   • Sulfa Antibiotics Other (See Comments)     Per pt - swollen lymph nodes       The following portions of the patient's history were reviewed and updated as appropriate: allergies, current medications, past family history, past medical history, past social history, past surgical history and problem list.        Objective    Objective      Vital Signs:   Vitals:    02/27/24  "1054   BP: 120/80   Weight: 88.9 kg (195 lb 15.8 oz)   Height: 167.5 cm (65.95\")       Ortho Exam:  LEFT SHOULDER  Pain with Blake's testing and biceps testing  Stoic  Subacromial pain    Results Review:  Imaging Results (Last 24 Hours)       ** No results found for the last 24 hours. **            MRI Shoulder Left Without Contrast    Result Date: 2/22/2024  Impression: Full-thickness, partial-width tear of the supraspinatus tendon. Small glenohumeral joint effusion with fluid in the subacromial-subdeltoid bursa owing to the full-thickness rotator cuff tear. There is thin wispy fluid extending from the area of the subscapularis recess and subcoracoid bursa which extends along the anterior aspect of the subscapularis muscle belly, likely leaking bursal fluid. Electronically Signed: Xavier Waters MD  2/22/2024 9:45 AM EST  Workstation ID: [id number]        I personally reviewed the imaging above. Full thickness supraspinatus tear, effusion, bursitis  Images provided to patient showing tearing      Procedures    LEFT SHOULDER SUBACROMIAL SPACE INJECTION: Risks and benefits of a shoulder subacromial space injection were discussed and the patient desired to proceed. Verbal consent was obtained. The patient understood the risk of infection, potential skin changes, bump in blood glucose especially with diabetes, nerve injury, possibility of increased pain in the short term, and possible incomplete pain relief.  Using sterile technique, the shoulder subacromial space was injected from a posterior approach with 1mL of 40 mg triamcinolone acetonide 40 MG/ML and 5cc of lidocaine with aspiration prior to injection. The patient tolerated the procedure without difficulty.  CPT CODE 97784 for major joint aspiration/injection          Assessment / Plan      Assessment/Plan:   Problem List Items Addressed This Visit          Musculoskeletal and Injuries    Injury of left rotator cuff    Relevant Orders    Ambulatory Referral to " Physical Therapy Evaluate and treat, Ortho (Completed)    Impingement syndrome of left shoulder    Relevant Orders    Ambulatory Referral to Physical Therapy Evaluate and treat, Ortho (Completed)    Bursitis of left shoulder    Relevant Orders    Ambulatory Referral to Physical Therapy Evaluate and treat, Ortho (Completed)    Left shoulder pain    Relevant Orders    Ambulatory Referral to Physical Therapy Evaluate and treat, Ortho (Completed)    Nontraumatic complete tear of left rotator cuff - Primary    Relevant Orders    Ambulatory Referral to Physical Therapy Evaluate and treat, Ortho (Completed)    Biceps tendinitis of left upper extremity    Relevant Orders    Ambulatory Referral to Physical Therapy Evaluate and treat, Ortho (Completed)    Superior glenoid labrum lesion of left shoulder    Relevant Orders    Ambulatory Referral to Physical Therapy Evaluate and treat, Ortho (Completed)       LEFT SHOULDER  Counseled on operative versus nonoperative measures.  The general recommendation for somebody otherwise young and healthy will proceed with arthroscopic rotator cuff repair possible biceps tenodesis given full-thickness rotator cuff tearing.  She has significant night pain.  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  She would like to hold off and wait till the fall or closer toward winter. Counseled on potential tear progression/atrophy/fatty infiltration over time. She understands that tear progression can occur over time.  She understands that we would typically only recommend 1 injection at the most.  She was definitely in favor of injection and holding off on surgery at this time.  Physical therapy also recommended in the interim.  I will see her back in 2 to 3 months for recheck.  She will monitor pain as pain can be as symptom of tear progression.    Follow Up: 2-3 months       Simba Serrano MD, FAAOS  Orthopedic Surgeon  Fellowship Trained Shoulder and Elbow Surgeon  [facility]  Orthopedics and Sports Medicine  [address], [address]  [place] [address]    02/27/24  11:52 EST

## 2024-02-27 NOTE — PROGRESS NOTES
Procedure   - Large Joint Arthrocentesis: L subacromial bursa on 2/27/2024 11:36 AM  Indications: pain  Details: 21 G needle, posterior approach  Medications: 5 mL lidocaine PF 1% 1 %; 40 mg triamcinolone acetonide 40 MG/ML  Outcome: tolerated well, no immediate complications  Procedure, treatment alternatives, risks and benefits explained, specific risks discussed. Consent was given by the patient. Immediately prior to procedure a time out was called to verify the correct patient, procedure, equipment, support staff and site/side marked as required. Patient was prepped and draped in the usual sterile fashion.

## 2024-03-14 DIAGNOSIS — E66.09 CLASS 1 OBESITY DUE TO EXCESS CALORIES WITH SERIOUS COMORBIDITY AND BODY MASS INDEX (BMI) OF 30.0 TO 30.9 IN ADULT: ICD-10-CM

## 2024-03-14 RX ORDER — PHENDIMETRAZINE TARTRATE 35 MG/1
35 TABLET ORAL 3 TIMES DAILY
Qty: 90 EACH | Refills: 2 | Status: SHIPPED | OUTPATIENT
Start: 2024-03-14

## 2024-04-05 RX ORDER — MINOXIDIL 10 MG/1
5 TABLET ORAL DAILY
Qty: 46 TABLET | Refills: 1 | Status: SHIPPED | OUTPATIENT
Start: 2024-04-05

## 2024-04-05 NOTE — TELEPHONE ENCOUNTER
Rx Refill Note  Requested Prescriptions     Pending Prescriptions Disp Refills    minoxidil (LONITEN) 10 MG tablet [Pharmacy Med Name: MINOXIDIL 10 MG TABLET] 30 tablet      Sig: TAKE 1/2 TABLET BY MOUTH DAILY      Last office visit with prescribing clinician: 12/7/2023   Last telemedicine visit with prescribing clinician: Visit date not found   Next office visit with prescribing clinician: 12/10/2024                         Would you like a call back once the refill request has been completed: [] Yes [] No    If the office needs to give you a call back, can they leave a voicemail: [] Yes [] No    Ana Hinkle MA  04/05/24, 08:52 EDT

## 2024-04-11 RX ORDER — DEXTROMETHORPHAN HYDROBROMIDE AND PROMETHAZINE HYDROCHLORIDE 15; 6.25 MG/5ML; MG/5ML
SYRUP ORAL
Qty: 180 ML | Refills: 0 | OUTPATIENT
Start: 2024-04-11

## 2024-04-16 ENCOUNTER — TELEPHONE (OUTPATIENT)
Dept: OBSTETRICS AND GYNECOLOGY | Facility: CLINIC | Age: 54
End: 2024-04-16
Payer: COMMERCIAL

## 2024-04-16 NOTE — TELEPHONE ENCOUNTER
PA received for Vagifem, pending. I called [facility] and they said it is going though her insurance but her Copay is $57.

## 2024-05-07 ENCOUNTER — OFFICE VISIT (OUTPATIENT)
Age: 54
End: 2024-05-07
Payer: COMMERCIAL

## 2024-05-07 VITALS — HEIGHT: 66 IN | DIASTOLIC BLOOD PRESSURE: 86 MMHG | BODY MASS INDEX: 31.69 KG/M2 | SYSTOLIC BLOOD PRESSURE: 122 MMHG

## 2024-05-07 DIAGNOSIS — M25.512 LEFT SHOULDER PAIN, UNSPECIFIED CHRONICITY: ICD-10-CM

## 2024-05-07 DIAGNOSIS — M75.122 NONTRAUMATIC COMPLETE TEAR OF LEFT ROTATOR CUFF: Primary | ICD-10-CM

## 2024-05-07 DIAGNOSIS — M75.52 BURSITIS OF LEFT SHOULDER: ICD-10-CM

## 2024-05-07 DIAGNOSIS — M75.42 IMPINGEMENT SYNDROME OF LEFT SHOULDER: ICD-10-CM

## 2024-05-07 DIAGNOSIS — M75.22 BICEPS TENDINITIS OF LEFT UPPER EXTREMITY: ICD-10-CM

## 2024-05-07 DIAGNOSIS — S46.002A INJURY OF LEFT ROTATOR CUFF, INITIAL ENCOUNTER: ICD-10-CM

## 2024-05-07 DIAGNOSIS — S43.432A SUPERIOR GLENOID LABRUM LESION OF LEFT SHOULDER, INITIAL ENCOUNTER: ICD-10-CM

## 2024-05-07 PROCEDURE — 99214 OFFICE O/P EST MOD 30 MIN: CPT | Performed by: ORTHOPAEDIC SURGERY

## 2024-05-09 ENCOUNTER — HOSPITAL ENCOUNTER (OUTPATIENT)
Dept: HOSPITAL 22 - PT | Age: 54
LOS: 12 days | Discharge: HOME | End: 2024-05-21
Payer: COMMERCIAL

## 2024-05-09 DIAGNOSIS — M75.42: ICD-10-CM

## 2024-05-09 DIAGNOSIS — M75.122: Primary | ICD-10-CM

## 2024-05-09 DIAGNOSIS — S46.002A: ICD-10-CM

## 2024-05-09 DIAGNOSIS — S43.432A: ICD-10-CM

## 2024-05-09 DIAGNOSIS — M25.512: ICD-10-CM

## 2024-05-09 DIAGNOSIS — M75.22: ICD-10-CM

## 2024-05-09 DIAGNOSIS — M75.52: ICD-10-CM

## 2024-05-09 PROCEDURE — G0283 ELEC STIM OTHER THAN WOUND: HCPCS

## 2024-05-09 PROCEDURE — 97035 APP MDLTY 1+ULTRASOUND EA 15: CPT

## 2024-05-09 PROCEDURE — 97014 ELECTRIC STIMULATION THERAPY: CPT

## 2024-05-09 PROCEDURE — 97163 PT EVAL HIGH COMPLEX 45 MIN: CPT

## 2024-05-09 PROCEDURE — 97010 HOT OR COLD PACKS THERAPY: CPT

## 2024-05-09 PROCEDURE — 97110 THERAPEUTIC EXERCISES: CPT

## 2024-05-09 PROCEDURE — 97140 MANUAL THERAPY 1/> REGIONS: CPT

## 2024-06-15 DIAGNOSIS — F51.01 PRIMARY INSOMNIA: ICD-10-CM

## 2024-06-17 ENCOUNTER — OUTSIDE FACILITY SERVICE (OUTPATIENT)
Dept: ORTHOPEDIC SURGERY | Facility: CLINIC | Age: 54
End: 2024-06-17
Payer: COMMERCIAL

## 2024-06-17 ENCOUNTER — DOCUMENTATION (OUTPATIENT)
Dept: ORTHOPEDIC SURGERY | Facility: CLINIC | Age: 54
End: 2024-06-17

## 2024-06-17 DIAGNOSIS — M75.122 NONTRAUMATIC COMPLETE TEAR OF LEFT ROTATOR CUFF: ICD-10-CM

## 2024-06-17 DIAGNOSIS — M75.42 IMPINGEMENT SYNDROME OF LEFT SHOULDER: ICD-10-CM

## 2024-06-17 DIAGNOSIS — S43.432A SUPERIOR GLENOID LABRUM LESION OF LEFT SHOULDER, INITIAL ENCOUNTER: ICD-10-CM

## 2024-06-17 DIAGNOSIS — M75.52 BURSITIS OF LEFT SHOULDER: ICD-10-CM

## 2024-06-17 DIAGNOSIS — Z98.890 STATUS POST LEFT ROTATOR CUFF REPAIR: Primary | ICD-10-CM

## 2024-06-17 DIAGNOSIS — M25.512 LEFT SHOULDER PAIN, UNSPECIFIED CHRONICITY: ICD-10-CM

## 2024-06-17 DIAGNOSIS — S46.002A INJURY OF LEFT ROTATOR CUFF, INITIAL ENCOUNTER: ICD-10-CM

## 2024-06-17 DIAGNOSIS — M75.22 BICEPS TENDINITIS OF LEFT UPPER EXTREMITY: ICD-10-CM

## 2024-06-17 PROCEDURE — 29826 SHO ARTHRS SRG DECOMPRESSION: CPT

## 2024-06-17 PROCEDURE — 29827 SHO ARTHRS SRG RT8TR CUF RPR: CPT | Performed by: ORTHOPAEDIC SURGERY

## 2024-06-17 PROCEDURE — 29826 SHO ARTHRS SRG DECOMPRESSION: CPT | Performed by: ORTHOPAEDIC SURGERY

## 2024-06-17 PROCEDURE — 29827 SHO ARTHRS SRG RT8TR CUF RPR: CPT

## 2024-06-17 RX ORDER — METHOCARBAMOL 500 MG/1
500 TABLET, FILM COATED ORAL 3 TIMES DAILY PRN
Qty: 42 TABLET | Refills: 0 | Status: SHIPPED | OUTPATIENT
Start: 2024-06-17 | End: 2024-07-01

## 2024-06-17 RX ORDER — HYDROCODONE BITARTRATE AND ACETAMINOPHEN 10; 325 MG/1; MG/1
1 TABLET ORAL EVERY 6 HOURS PRN
Qty: 28 TABLET | Refills: 0 | Status: SHIPPED | OUTPATIENT
Start: 2024-06-17 | End: 2024-06-24

## 2024-06-17 RX ORDER — ONDANSETRON 4 MG/1
4 TABLET, FILM COATED ORAL EVERY 6 HOURS PRN
Qty: 30 TABLET | Refills: 1 | Status: SHIPPED | OUTPATIENT
Start: 2024-06-17 | End: 2024-07-06

## 2024-06-17 RX ORDER — TRAZODONE HYDROCHLORIDE 50 MG/1
50 TABLET ORAL NIGHTLY
Qty: 30 TABLET | Refills: 5 | Status: SHIPPED | OUTPATIENT
Start: 2024-06-17

## 2024-06-17 NOTE — PROGRESS NOTES
Operative Report     Side/SHOULDER: LEFT SHOULDER    LOCATION:   [facility]  [address] [place] [address]    [facility] OPERATIVE REPORT    Surgeon: Simba Serrano MD     Assistant: DAYA Mujica     The skilled assistance of the above noted first assistant was necessary during this complex surgical procedure.  The surgical assistant assisted with every aspect of the operation including, but not limited to, proper and safe positioning of the patient, obtaining adequate surgical exposure, manipulation of surgical instruments, suture management, surgical knot tying when necessary, the continual process of hemostasis during the procedure itself in addition to surgical wound closure and removal of the patient from the operating table and returning the patient back to the [facility].  The assistance of the surgical assistant allowed me to perform the most sensitive and technical potions of this operation using 2 hands, thus enhancing efficiency and patient safety.  This would not be possible without the help of a skilled assistant familiar with the procedure and capable of safely performing the aforementioned tasks.     Date of Surgery: 6/17/2024  Shoulder: LEFT shoulder   Preoperative Diagnosis:  1.     Rotator cuff tear, full-thickness retracted tear with dysplastic features- treated with arthroscopic rotator cuff repair - CPT 37884  2.     Shoulder impingement syndrome, CA ligament tearing- treated with arthroscopic subacromial decompression with acromioplasty - CPT 85513     Postoperative Diagnosis:  1.     SAME as preoperative diagnoses, mild biceps synovitis within the joint but otherwise intact and no indication for biceps tenodesis.     Procedure:  1.     Arthroscopic rotator cuff repair (2x2) - CPT 85853  2.     Arthroscopic subacromial decompression with acromioplasty - CPT 61146        Admission status: elective  outpatient surgery    Complications: None     EBL: 10mL     Specimen: NONE     Anesthesia: general anesthesia     Block: regional interscalene block with single shot per anesthesia     Antibiotics: weight based IV antibiotics infused prior to incision     Time out: Time out was called and the patient, side, site, and intended procedures were confirmed.     Counts: Needle and sponge counts were correct prior to closure.     DVT prophylaxis: The patient will be weight bearing as tolerated on bilateral lower extremities postoperatively with no additional chemical DVT prophylaxis indicated.     Marked: The patient was marked with an indelible marker in the preoperative holding area confirming the correct side and site.     History & Physical:   A history and physical examination was completed and updated in the preoperative holding area.     Consent: The patient signed the consent form for surgery with an understanding of the risks and benefits as outlined in the clinic and in the preoperative holding area.     Risks and Benefits: Specific risks and benefits discussed included - pain, bleeding, infection, injury to nerves or blood vessels, fracture, stiffness, failure to heal, revision surgery, deformity of biceps or asymmetry, complications of the block, anesthetic complications, and medical complications associated with surgery.        Indications for surgery:  The patient has history, physical examination, and radiographic findings confirming the diagnoses.  The patient has persistent pain and functional loss unresponsive to non-operative treatment consisting of selective rest/activity modification, medications, and exercises.  MRI documented the status of the rotator cuff - rotator cuff tearing was noted.     Impingement syndrome - the patient had history and clinical exam findings consistent with shoulder impingement syndrome.  Additionally, the patient had subacromial bursitis which was encountered during the  arthroscopic shoulder procedure.  Subacromial decompression with minimal acromioplasty was indicated as part of the treatment of impingement syndrome, and additionally to enhance arthroscopic visualization to enable minimally invasive, arthroscopic rotator cuff repair.       Patient had known full-thickness tearing noted on MRI and was hopeful to avoid surgery but left-hand-dominant and the dominant side was affected with the left shoulder.  Ultimately she desired to proceed with surgery which is a very reasonable choice given full-thickness retracted tears with chronic features.     Operative Findings:  Rotator Cuff Tissue Quality: Full-thickness tearing with chronic dysplastic features     Status of the Rotator Cuff:  Supraspinatus -full-thickness tearing with posterior extension     Size of Rotator Cuff Tear:  Combined supraspinatus tear with extension into the infraspinatus -20+ mm     Rotator Cuff Repair Construct:  2x2 Transosseous Equivalent Double Row Arthroscopic Rotator Cuff Repair      Rotator Cuff Implants:  Medial Row: 2 double loaded [employer] 4.5mm Healicoil PEEK-all 8 limbs were utilized  Lateral Row: 2 Lateral Anchors - [employer] 5.5mm Footprint PEEK      Bone Quality: Overall reasonable bone quality all 4 suture anchors had solid fixation, somewhat softer bone with the posterior lateral anchor but it was dynamically tested and noted to be stable.     Labrum: Degenerative SLAP 2 tearing, minimal synovitis of the biceps-biceps was otherwise well-appearing     Humeral Head: Negative  Glenoid: Degenerative labral tearing is noted     Contracture: Negative  Pathological laxity: Negative     Procedure in detail:  Regional interscalene block was completed in the preoperative area by the anesthesia team.  The patient was supine on the operative table and the anesthesia team initiated general anesthesia.  The patient was placed in a modified beach chair position with the neck secured in neutral  alignment and all bony prominences were well padded.     The shoulder was assessed with an examination under anesthesia.     The operative extremity was cleaned with alcohol and then the extremity was prepped and draped in the standard fashion.  The surgical arm was placed into a well-padded arm frye. A standard posterior portal was created with an incision made 1 cm inferior 1 cm medial to the posterolateral acromial corner.  A blunt metal trocar and cannula were inserted into the glenohumeral joint.  The arthroscopic pump was connected and the above findings and diagnoses were noted as part of the diagnostic shoulder arthroscopy.       A spinal needle was used to localize the anterior portal position in the rotator interval. A 5.5mm plastic cannula and trocar were inserted followed by a 4.5mm shaver/cautery device.  The shaver was used for debridement in the glenohumeral joint.  The biceps tendon was inspected and did not necessitate biceps tenodesis.  Closely inspected the biceps.  She did have some degenerative labral fraying which was debrided the biceps was pulled into the joint and noted to be intact there is minimal synovitis within the joint and otherwise well-appearing.     The arthroscope and metal cannula were then removed in order to transition to the subacromial space.  The blunt metal trocar and cannula were inserted into the subacromial space and the lateral portal site identified with a spinal needle.  An 8 mm plastic cannula and trocar were inserted.  I turned my attention to the arthroscopic subacromial decompression with acromioplasty. Bursitis was noted in the subacromial space and impacted visualization of the rotator cuff.  The 4.5mm shaver/cautery device was used to clear the subacromial space until the acromion could be identified and bursal resection was completed to allow rotator cuff visualization.  The shaver was used to remove fibrous tissue from the acromial undersurface until the  anterolateral and anterior medial corners of the acromion were clearly identified.  The coracoacromial ligament was not released.  The shaver was used to perform a minimal acromioplasty.  The shaver/cautery device was used to perform the subacromial decompression with minimal acromioplasty.      I turned my attention to the rotator cuff tear and the arthroscopic rotator cuff repair.  The torn rotator cuff tendon was grasped with a handheld grasper and assessed for mobility and integrity.  The soft tissue at the greater tuberosity insertion site was removed and a power shaver was used to create a healthy bleeding bed to enhance rotator cuff tendon healing.     Arthroscopic rotator cuff suture anchors were then inserted for the rotator cuff repair.  The two medial row arthroscopic rotator cuff repair anchors were inserted first from anterior to posterior and the sutures from the anchors were withdrawn through the anterior cannula. An arthroscopic suture passer was used to place the high strength sutures through the rotator cuff tendon in an inverted mattress fashion. The sutures were then inserted laterally into 2 lateral knotless anchors in a cruciform pattern with appropriate tensioning.  The completed arthroscopic rotator cuff repair was inspected dynamically and the arthroscopic instruments removed along with the arthroscopic fluid. The incisions were closed with nylon suture and steri-strips were placed over the incisions.  A sterile dressing was applied followed by a neutral rotation sling.  The patient tolerated the procedure well and was transported to the recovery room in satisfactory condition.        Rotator Cuff Repair - POSTOPERATIVE PLAN:  Follow-up in the office in 2 weeks with 1 view of the operative shoulder at that time  Sutures: Nylon sutures to come out in 2 weeks  DVT prophylaxis: No additional chemical DVT prophylaxis indicated  Weightbearing: Nonweightbearing on operative extremity, no biceps  loading, pendulums/elbow/wrist/hand motion encouraged  Neutral rotation sling for 3 to 4 weeks following the surgery  Physical therapy: Formal outpatient physical therapy will be provided at the 2-week appointment in the office.  Rotator cuff repair protocol    Electronically signed by Simba Serrano MD, 06/17/24, 11:42 AM EDT.

## 2024-06-19 DIAGNOSIS — E66.09 CLASS 1 OBESITY DUE TO EXCESS CALORIES WITH SERIOUS COMORBIDITY AND BODY MASS INDEX (BMI) OF 30.0 TO 30.9 IN ADULT: ICD-10-CM

## 2024-06-19 RX ORDER — PHENDIMETRAZINE TARTRATE 35 MG/1
1 TABLET ORAL 3 TIMES DAILY
Qty: 90 EACH | Refills: 2 | Status: SHIPPED | OUTPATIENT
Start: 2024-06-19

## 2024-06-19 NOTE — TELEPHONE ENCOUNTER
Rx Refill Note  Requested Prescriptions     Pending Prescriptions Disp Refills    Phendimetrazine Tartrate 35 MG tablet [Pharmacy Med Name: PHENDIMETRAZINE 35 MG TABLET]       Sig: TAKE 1 TABLET BY MOUTH 3 TIMES A DAY      Last office visit with prescribing clinician: 12/7/2023   Next office visit with prescribing clinician: 12/10/2024     LA: 03/14/24 #90 2R                          Would you like a call back once the refill request has been completed: [] Yes [] No    If the office needs to give you a call back, can they leave a voicemail: [] Yes [] No    Nicole Gamez LPN  06/19/24, 13:41 EDT

## 2024-07-02 ENCOUNTER — OFFICE VISIT (OUTPATIENT)
Age: 54
End: 2024-07-02
Payer: COMMERCIAL

## 2024-07-02 VITALS — TEMPERATURE: 97.8 F

## 2024-07-02 DIAGNOSIS — Z98.890 STATUS POST LEFT ROTATOR CUFF REPAIR: Primary | ICD-10-CM

## 2024-07-02 PROCEDURE — 99024 POSTOP FOLLOW-UP VISIT: CPT

## 2024-07-02 NOTE — PROGRESS NOTES
[facility] Office Follow Up       Office Follow Up Visit       Patient Name: Laine Hernandez    Chief Complaint:   Chief Complaint   Patient presents with   • Post-op     2 week s/p Arthroscopic rotator cuff repair; Arthroscopic subacromial decompression with acromioplasty left 6/17/24       Referring Physician: Carina Chan MD    History of Present Illness:   Laine Hernandez returns to clinic today for 2-week postop visit s/p left shoulder rotator cuff repair with Dr. Serrano.  She reports to be doing well.  Pain has been improving. Taking Vitamin E and C for the pain. She has been wearing the sling as instructed.    Procedure:  1.     Arthroscopic rotator cuff repair (2x2) - CPT 12087  2.     Arthroscopic subacromial decompression with acromioplasty - CPT 22215    Subjective     Review of Systems   Constitutional: Negative.  Negative for chills, fatigue and fever.   HENT: Negative.  Negative for congestion and dental problem.    Eyes: Negative.  Negative for blurred vision.   Respiratory: Negative.  Negative for shortness of breath.    Cardiovascular: Negative.  Negative for leg swelling.   Gastrointestinal: Negative.  Negative for abdominal pain.   Endocrine: Negative.  Negative for polyuria.   Genitourinary: Negative.  Negative for difficulty urinating.   Musculoskeletal:  Positive for arthralgias.   Skin: Negative.    Allergic/Immunologic: Negative.    Neurological: Negative.    Hematological: Negative.  Negative for adenopathy.   Psychiatric/Behavioral: Negative.  Negative for behavioral problems.         I have reviewed and updated the following portions of the patient's history and review of systems: allergies, current medications, past family history, past medical history, past social history, past surgical history and problem list.    Medications:   Current Outpatient Medications:   •  Ascorbic Acid (VITAMIN C PO),  Take  by mouth Daily., Disp: , Rfl:   •  Calcium Carb-Cholecalciferol (CALCIUM 500/VITAMIN D PO), Take  by mouth. 2 gummies daily, Disp: , Rfl:   •  Cholecalciferol (VITAMIN D3 PO), Take 1,000 Units by mouth 2 (Two) Times a Day. Not in summer months, Disp: , Rfl:   •  clindamycin (CLEOCIN T) 1 % external solution, , Disp: , Rfl:   •  diclofenac (VOLTAREN) 50 MG EC tablet, TAKE ONE TABLET BY MOUTH TWICE A DAY AS NEEDED FOR PAIN, Disp: 60 tablet, Rfl: 5  •  estradiol (Vagifem) 10 MCG tablet vaginal tablet, Insert 1 tablet into the vagina 2 (Two) Times a Week for 336 days., Disp: 8 tablet, Rfl: 11  •  fexofenadine (ALLEGRA) 180 MG tablet, Take 1 tablet by mouth Daily As Needed (allergies)., Disp: , Rfl:   •  Lactobacillus (ACIDOPHILUS PO), Take  by mouth Daily., Disp: , Rfl:   •  magnesium, as, gluconate (MAGONATE) 500 (27 Mg) MG tablet, Take 1 tablet by mouth Daily., Disp: 90 tablet, Rfl: 1  •  meclizine (ANTIVERT) 12.5 MG tablet, As Needed., Disp: , Rfl:   •  minoxidil (LONITEN) 10 MG tablet, TAKE 1/2 TABLET BY MOUTH DAILY, Disp: 46 tablet, Rfl: 1  •  multivitamin with minerals tablet tablet, Take 1 tablet by mouth Daily., Disp: , Rfl:   •  niacin 500 MG tablet, Take 3 tablets by mouth Daily., Disp: , Rfl:   •  NON FORMULARY, Wild yam root capsules, Disp: , Rfl:   •  ondansetron (Zofran) 4 MG tablet, Take 1 tablet by mouth Every 6 (Six) Hours As Needed for Nausea or Vomiting for up to 8 days., Disp: 30 tablet, Rfl: 1  •  Phendimetrazine Tartrate 35 MG tablet, TAKE 1 TABLET BY MOUTH 3 TIMES A DAY, Disp: 90 each, Rfl: 2  •  Sodium Hyaluronate, oral, (HYALURONIC ACID PO), Take  by mouth Daily., Disp: , Rfl:   •  traZODone (DESYREL) 50 MG tablet, TAKE ONE TABLET BY MOUTH ONCE NIGHTLY, Disp: 30 tablet, Rfl: 5    Allergies:   Allergies   Allergen Reactions   • Latex Itching   • Sulfa Antibiotics Other (See Comments)     Per pt - swollen lymph nodes         Objective      Vital Signs:   Vitals:    07/02/24 1137   Temp: 97.8  °F (36.6 °C)       Ortho Exam:  General: comfortable  Vascular: 2+ radial pulse  Neurologic: sensation to light touch is intact distally, elbow flexion/elbow extension/wrist flexion/wrist extension/hand intrinsics intact, able to fire deltoid; no residual defects noted from the block  Dermatologic: surgical incisions are well appearing, with no drainage or surrounding erythema; no signs or symptoms of infection or DVT      Results Review:  XR Shoulder 1 View Left  Imaging: shoulder x-rays 1 view - AP x-ray views    Side: LEFT SHOULDER    Indication for shoulder x-ray 1 view: shoulder pain, postop evaluation   following surgery    Comparison: the current xray was compared to preoperative imaging and   indicates expected postoperative changes.    Findings: No acute bony pathology. Well appearing and well positioned   compared to preoperative imaging.  Located and no fracture noted.    I personally reviewed the above x-rays.       Assessment / Plan      Assessment:   Diagnoses and all orders for this visit:    1. Status post left rotator cuff repair (Primary)  -     XR Shoulder 1 View Left  -     Ambulatory Referral to Physical Therapy for Evaluation & Treatment        Quality Metrics:   BMI:   BMI is >= 30 and <35. (Class 1 Obesity). The following options were offered after discussion;: weight loss educational material (shared in after visit summary)       Tobacco:   Laine Hernandez  reports that she has never smoked. She has been exposed to tobacco smoke. She has never used smokeless tobacco.           Plan:  Recommend over the counter anti-inflammatories for pain and/or swelling.  Patient will continue with the sling for another week or 2.  Remain nonweightbearing on operative side.  To start with physical therapy.  I have given a PT prescription as well as 2 copies of the protocol.  We reviewed the intraoperative photographs together.  Sutures out today.       History, diagnosis and treatment plan discussed with  Dr. Serrano.      Follow Up:   2 months    Georgia Rasmussen PA-C  [facility]    Dictated using Dragon Speech Recognition.

## 2024-08-13 ENCOUNTER — OFFICE VISIT (OUTPATIENT)
Dept: OBSTETRICS AND GYNECOLOGY | Facility: CLINIC | Age: 54
End: 2024-08-13
Payer: COMMERCIAL

## 2024-08-13 VITALS
SYSTOLIC BLOOD PRESSURE: 118 MMHG | DIASTOLIC BLOOD PRESSURE: 82 MMHG | HEIGHT: 66 IN | WEIGHT: 182.6 LBS | BODY MASS INDEX: 29.35 KG/M2

## 2024-08-13 DIAGNOSIS — N95.8 GENITOURINARY SYNDROME OF MENOPAUSE: ICD-10-CM

## 2024-08-13 DIAGNOSIS — N94.10 FEMALE DYSPAREUNIA: Primary | ICD-10-CM

## 2024-08-13 RX ORDER — ESTRADIOL 10 UG/1
1 INSERT VAGINAL DAILY
Qty: 30 TABLET | Refills: 11 | Status: SHIPPED | OUTPATIENT
Start: 2024-08-13 | End: 2025-08-08

## 2024-08-13 RX ORDER — DIAZEPAM 5 MG/1
5 TABLET ORAL EVERY 8 HOURS PRN
Qty: 30 TABLET | Refills: 0 | Status: SHIPPED | OUTPATIENT
Start: 2024-08-13 | End: 2025-08-13

## 2024-08-13 RX ORDER — PROGESTERONE 100 MG/1
100 CAPSULE ORAL DAILY
Qty: 30 CAPSULE | Refills: 11 | Status: SHIPPED | OUTPATIENT
Start: 2024-08-13

## 2024-08-13 NOTE — PROGRESS NOTES
Chief Complaint   Patient presents with    Follow-up       Subjective   HPI  Laine Hernandez is a 54 y.o. female, . Her last LMP was No LMP recorded. Patient has had an ablation. Patient presents for follow up on atrophic vaginitis.      At her last visit she was started on  Vagifem . She has been using Vagifem for 6 months. She reports noticing great improvement in vaginal dryness with the initiation dose for 2 weeks, but began having vaginal dryness when switching to twice weekly. She reports having rotator cuff surgery in 2024 and did not use Vagifem for 1 month due ot needing 2 hands for application. She states that she has tried to have IC 3 separate times since beginning Vagifem and was too painful. She states that 1 attempt at IC was during the initiation dose and was not able due to pain. The patient reports additional symptoms as none.        Additional OB/GYN History     Last Pap :   Last Completed Pap Smear            PAP SMEAR (Every 3 Years) Next due on 2024  LIQUID-BASED PAP SMEAR WITH HPV GENOTYPING REGARDLESS OF INTERPRETATION (ROSSANA,COR,MAD)    2019  Done                    Last mammogram:   Last Completed Mammogram            MAMMOGRAM (Every 2 Years) Next due on 2024  Mammo Screening Digital Tomosynthesis Bilateral With CAD                    Tobacco Usage?: No   OB History          0    Para   0    Term   0            AB        Living             SAB        IAB        Ectopic        Molar        Multiple        Live Births                      Current Outpatient Medications:     Ascorbic Acid (VITAMIN C PO), Take  by mouth Daily., Disp: , Rfl:     Calcium Carb-Cholecalciferol (CALCIUM 500/VITAMIN D PO), Take  by mouth. 2 gummies daily, Disp: , Rfl:     Cholecalciferol (VITAMIN D3 PO), Take 1,000 Units by mouth 2 (Two) Times a Day. Not in summer months, Disp: , Rfl:     clindamycin (CLEOCIN T) 1 % external  solution, , Disp: , Rfl:     diclofenac (VOLTAREN) 50 MG EC tablet, TAKE ONE TABLET BY MOUTH TWICE A DAY AS NEEDED FOR PAIN, Disp: 60 tablet, Rfl: 5    estradiol (Vagifem) 10 MCG tablet vaginal tablet, Insert 1 tablet into the vagina 2 (Two) Times a Week for 336 days., Disp: 8 tablet, Rfl: 11    fexofenadine (ALLEGRA) 180 MG tablet, Take 1 tablet by mouth Daily As Needed (allergies)., Disp: , Rfl:     Lactobacillus (ACIDOPHILUS PO), Take  by mouth Daily., Disp: , Rfl:     magnesium, as, gluconate (MAGONATE) 500 (27 Mg) MG tablet, Take 1 tablet by mouth Daily., Disp: 90 tablet, Rfl: 1    meclizine (ANTIVERT) 12.5 MG tablet, As Needed., Disp: , Rfl:     minoxidil (LONITEN) 10 MG tablet, TAKE 1/2 TABLET BY MOUTH DAILY, Disp: 46 tablet, Rfl: 1    multivitamin with minerals tablet tablet, Take 1 tablet by mouth Daily., Disp: , Rfl:     niacin 500 MG tablet, Take 3 tablets by mouth Daily., Disp: , Rfl:     NON FORMULARY, Wild yam root capsules, Disp: , Rfl:     Phendimetrazine Tartrate 35 MG tablet, TAKE 1 TABLET BY MOUTH 3 TIMES A DAY, Disp: 90 each, Rfl: 2    Sodium Hyaluronate, oral, (HYALURONIC ACID PO), Take  by mouth Daily., Disp: , Rfl:     traZODone (DESYREL) 50 MG tablet, TAKE ONE TABLET BY MOUTH ONCE NIGHTLY, Disp: 30 tablet, Rfl: 5     Past Medical History:   Diagnosis Date    Abnormal Pap smear of cervix 2003    Allergic 2005    The usual pollen allergies, plus sulfa antibiotics and latex    Ankle sprain     Arthritis     Cancer 2019    On face - removed in 2019    Cataract 2019    Cervical dysplasia 2003    Class 1 obesity due to excess calories with serious comorbidity and body mass index (BMI) of 31.0 to 31.9 in adult 06/21/2022    Depression     Diverticulosis 2011    Just one episode, none since    Fracture, foot 2007    GERD (gastroesophageal reflux disease)     Headache     Hip arthrosis July 2020    HPV (human papilloma virus) infection 2003    Hyperlipidemia 2023    Menorrhalgia 2012    uterine  "ablation performed    Migraine 2009    I get ~6 migraines/year that last 24-48 hrs    Neuroma of foot 2013    PMS (premenstrual syndrome) 1986    Rotator cuff syndrome 2012    Tendinitis of knee 1996    I tried running for daily exercise. My knees said no.    Tennis elbow 2011    From walking strong dogs    Varicella 1985        Past Surgical History:   Procedure Laterality Date    AUGMENTATION MAMMAPLASTY Bilateral 2002    SALINE    BREAST SURGERY  2002    augmentation     BUNIONECTOMY      2014 and 2015    ENDOMETRIAL ABLATION  2005    EYE SURGERY  2000    Lasik    FRACTURE SURGERY  2014 and 2015    Not fractures but bunionectomies    ROTATOR CUFF REPAIR Left 06/17/2024    Dr.Brent Serrano    SKIN CANCER EXCISION  2018    R cheek melanoma       The additional following portions of the patient's history were reviewed and updated as appropriate: allergies and current medications.    Review of Systems   Constitutional: Negative.    HENT: Negative.     Eyes: Negative.    Respiratory: Negative.     Cardiovascular: Negative.    Gastrointestinal: Negative.    Endocrine: Negative.    Genitourinary:  Positive for dyspareunia.        Vaginal dryness   Musculoskeletal: Negative.    Skin: Negative.    Allergic/Immunologic: Negative.    Neurological: Negative.    Hematological: Negative.    Psychiatric/Behavioral: Negative.         I have reviewed and agree with the HPI, ROS, and historical information as entered above. Lisa Urbina MD      Objective   /82   Ht 167.6 cm (66\")   Wt 82.8 kg (182 lb 9.6 oz)   BMI 29.47 kg/m²     Physical Exam  Constitutional:       Appearance: She is well-developed.   HENT:      Head: Normocephalic.   Eyes:      Conjunctiva/sclera: Conjunctivae normal.   Pulmonary:      Effort: Pulmonary effort is normal.   Abdominal:      Hernia: There is no hernia in the left inguinal area or right inguinal area.   Genitourinary:     General: Normal vulva.      Vagina: Tenderness present.      " Cervix: Normal.      Comments: Diffuse vaginal atrophy  Psychiatric:         Behavior: Behavior normal.       Assessment & Plan     Assessment     Problem List Items Addressed This Visit    None  Visit Diagnoses       Female dyspareunia    -  Primary    Genitourinary syndrome of menopause                  Plan     HERMES/Dysparunia - she has not had the improvement I had hoped for with the vaginal estrogen.  Will refer to PT, consider Fang Ngozi and try vaginal valium.  Speculum inserts without interference so hopefully we can improve the situation with these measures.  Increase vaginal estrogen to 5-7 days a week and add daily prometrium  Return in about 6 months (around 2/13/2025) for Annual physical.        Lisa Urbina MD  08/13/2024

## 2024-08-14 ENCOUNTER — TELEPHONE (OUTPATIENT)
Dept: OBSTETRICS AND GYNECOLOGY | Facility: CLINIC | Age: 54
End: 2024-08-14
Payer: COMMERCIAL

## 2024-08-14 NOTE — TELEPHONE ENCOUNTER
Prior Auth Estradiol 10MCG tablets 76 SUBMIT PA REQUEST TO:[email or web address] DAILY DOSE OF .[email or web address] prefers [id number] - ESTRADIOL CRE 0.01%, [id number] - YUVAFEM TAB 10MCG - pending.    (Key: [id number])  PA Case ID #: [id number]  Rx #: [id number]

## 2024-08-19 ENCOUNTER — TELEPHONE (OUTPATIENT)
Dept: OBSTETRICS AND GYNECOLOGY | Facility: CLINIC | Age: 54
End: 2024-08-19
Payer: COMMERCIAL

## 2024-08-19 NOTE — TELEPHONE ENCOUNTER
Update on daily Estradiol 10mcg tablets: N/A on August 15 by [employer]  The member recently filled this medication and will be able to return for their next refill according to their plan limits. Dr. Urbina wants to see if we can get Fang STAPLETON approval for Fang Melvin for genitourinary syndrome

## 2024-08-20 ENCOUNTER — TELEPHONE (OUTPATIENT)
Dept: OBSTETRICS AND GYNECOLOGY | Facility: CLINIC | Age: 54
End: 2024-08-20
Payer: COMMERCIAL

## 2024-08-20 NOTE — TELEPHONE ENCOUNTER
I did a new PA over the phone with Naeem, explaining Dr. Urbina wants the patient to have Vagifem 5-7 days a week not the traditional 2 times a week. #[id number] turn around 5-7 days

## 2024-08-20 NOTE — TELEPHONE ENCOUNTER
One month of 8 Vagifem was $54 dollars. With the PA the insurance will only pay for #18 a month and that cost is $130 .

## 2024-09-03 ENCOUNTER — OFFICE VISIT (OUTPATIENT)
Age: 54
End: 2024-09-03
Payer: COMMERCIAL

## 2024-09-03 DIAGNOSIS — Z98.890 STATUS POST LEFT ROTATOR CUFF REPAIR: Primary | ICD-10-CM

## 2024-09-03 PROCEDURE — 99024 POSTOP FOLLOW-UP VISIT: CPT

## 2024-09-03 NOTE — PROGRESS NOTES
[facility] Follow Up       Office Follow Up Visit       Patient Name: Laine Hernandez    Chief Complaint:   Chief Complaint   Patient presents with    Post-op     2.5 month recheck - status post Arthroscopic rotator cuff repair; Arthroscopic subacromial decompression with acromioplasty left -  6/17/24       Referring Physician: Carina Chan MD    History of Present Illness:   Laine Hernandez returns to clinic today for postop visit 2.5 months s/p left shoulder rotator cuff repair, subacromial decompression with Dr. Serrano.  She reports to be doing well.  She has been in physical therapy at [facility].  Seeing gradual improvements in left shoulder range of motion.    Procedure:  1.     Arthroscopic rotator cuff repair (2x2) - CPT 82132  2.     Arthroscopic subacromial decompression with acromioplasty - CPT 67534       Subjective     Review of Systems   Constitutional:  Negative for chills, fever, unexpected weight gain and unexpected weight loss.   HENT:  Negative for congestion, postnasal drip and rhinorrhea.    Eyes:  Negative for blurred vision.   Respiratory:  Negative for shortness of breath.    Cardiovascular:  Negative for leg swelling.   Gastrointestinal:  Negative for abdominal pain, nausea and vomiting.   Genitourinary:  Negative for difficulty urinating.   Musculoskeletal:  Positive for arthralgias. Negative for gait problem, joint swelling and myalgias.   Skin:  Negative for skin lesions and wound.   Neurological:  Negative for dizziness, weakness, light-headedness and numbness.   Hematological:  Does not bruise/bleed easily.   Psychiatric/Behavioral:  Negative for depressed mood.    All other systems reviewed and are negative.       I have reviewed and updated the following portions of the patient's history and review of systems: allergies, current medications, past family history, past medical  history, past social history, past surgical history and problem list.    Medications:   Current Outpatient Medications:     Ascorbic Acid (VITAMIN C PO), Take  by mouth Daily., Disp: , Rfl:     Calcium Carb-Cholecalciferol (CALCIUM 500/VITAMIN D PO), Take  by mouth. 2 gummies daily, Disp: , Rfl:     Cholecalciferol (VITAMIN D3 PO), Take 1,000 Units by mouth 2 (Two) Times a Day. Not in summer months, Disp: , Rfl:     clindamycin (CLEOCIN T) 1 % external solution, , Disp: , Rfl:     diazePAM (Valium) 5 MG tablet, Take 1 tablet by mouth Every 8 (Eight) Hours As Needed for Anxiety or Sleep., Disp: 30 tablet, Rfl: 0    diclofenac (VOLTAREN) 50 MG EC tablet, TAKE ONE TABLET BY MOUTH TWICE A DAY AS NEEDED FOR PAIN, Disp: 60 tablet, Rfl: 5    estradiol (Vagifem) 10 MCG tablet vaginal tablet, Insert 1 tablet into the vagina 2 (Two) Times a Week for 336 days., Disp: 8 tablet, Rfl: 11    estradiol (Vagifem) 10 MCG tablet vaginal tablet, Insert 1 tablet into the vagina Daily for 360 days., Disp: 30 tablet, Rfl: 11    fexofenadine (ALLEGRA) 180 MG tablet, Take 1 tablet by mouth Daily As Needed (allergies)., Disp: , Rfl:     Lactobacillus (ACIDOPHILUS PO), Take  by mouth Daily., Disp: , Rfl:     magnesium, as, gluconate (MAGONATE) 500 (27 Mg) MG tablet, Take 1 tablet by mouth Daily., Disp: 90 tablet, Rfl: 1    meclizine (ANTIVERT) 12.5 MG tablet, As Needed., Disp: , Rfl:     minoxidil (LONITEN) 10 MG tablet, TAKE 1/2 TABLET BY MOUTH DAILY, Disp: 46 tablet, Rfl: 1    multivitamin with minerals tablet tablet, Take 1 tablet by mouth Daily., Disp: , Rfl:     niacin 500 MG tablet, Take 3 tablets by mouth Daily., Disp: , Rfl:     NON FORMULARY, Wild yam root capsules, Disp: , Rfl:     Phendimetrazine Tartrate 35 MG tablet, TAKE 1 TABLET BY MOUTH 3 TIMES A DAY, Disp: 90 each, Rfl: 2    Progesterone (Prometrium) 100 MG capsule, Take 1 capsule by mouth Daily., Disp: 30 capsule, Rfl: 11    Sodium Hyaluronate, oral, (HYALURONIC ACID PO),  Take  by mouth Daily., Disp: , Rfl:     traZODone (DESYREL) 50 MG tablet, TAKE ONE TABLET BY MOUTH ONCE NIGHTLY, Disp: 30 tablet, Rfl: 5    Allergies:   Allergies   Allergen Reactions    Latex Itching    Sulfa Antibiotics Other (See Comments)     Per pt - swollen lymph nodes         Objective      Vital Signs: There were no vitals filed for this visit.    Ortho Exam:  General: no acute distress, comfortable  Vitals reviewed in chart    Musculoskeletal Exam:    SIDE: Left shoulder  Incisions well healed    Range of motion measurements (degrees): 130/120/60/40  Smooth arc  Negative lag sign  No evidence of septic joint      Results Review:  XR Shoulder 1 View Left  Imaging: shoulder x-rays 1 view - AP x-ray views    Side: LEFT SHOULDER    Indication for shoulder x-ray 1 view: shoulder pain, postop evaluation   following surgery    Comparison: the current xray was compared to preoperative imaging and   indicates expected postoperative changes.    Findings: No acute bony pathology. Well appearing and well positioned   compared to preoperative imaging.  Located and no fracture noted.    I personally reviewed the above x-rays.       I have noted results reviewed from previous encounter.      Assessment / Plan      Assessment:   Diagnoses and all orders for this visit:    1. Status post left rotator cuff repair (Primary)        Quality Metrics:   BMI:   BMI is >= 25 and <30. (Overweight) The following options were offered after discussion;: weight loss educational material (shared in after visit summary)       Tobacco:   Laine Hernandez  reports that she has never smoked. She has been exposed to tobacco smoke. She has never used smokeless tobacco.           Plan:  2.5 months s/p left rotator cuff repair with Dr. Serrano. Seeing gradual improvements as expected. Encouraged to continue with PT for range of motion. May start gradual strengthening in 2 weeks. She has 4 formal PT visits left and will then transition to home  program.  May take anti-inflammatories as needed.      Follow Up:   2 months    Georgia Rasmussen PA-C  [facility]    Dictated using Dragon Speech Recognition.

## 2024-10-02 RX ORDER — MINOXIDIL 10 MG/1
5 TABLET ORAL DAILY
Qty: 45 TABLET | Refills: 1 | Status: SHIPPED | OUTPATIENT
Start: 2024-10-02

## 2024-10-03 ENCOUNTER — TELEPHONE (OUTPATIENT)
Dept: OBSTETRICS AND GYNECOLOGY | Facility: CLINIC | Age: 54
End: 2024-10-03
Payer: COMMERCIAL

## 2024-10-03 DIAGNOSIS — N94.10 FEMALE DYSPAREUNIA: ICD-10-CM

## 2024-10-03 DIAGNOSIS — N95.2 ATROPHIC VAGINITIS: Primary | ICD-10-CM

## 2024-10-03 RX ORDER — ESTRADIOL 10 UG/1
INSERT VAGINAL
Qty: 18 TABLET | Refills: 11 | Status: SHIPPED | OUTPATIENT
Start: 2024-10-03

## 2024-10-03 NOTE — TELEPHONE ENCOUNTER
Insurance approves #18 for 28 generic Vagifem but the cost is cheaper with a discount card, $112.68. [facility]

## 2024-10-03 NOTE — TELEPHONE ENCOUNTER
The PA was denied for #30/30 Estradiol 10mcg vag tablets. Leeann said the health plan has a limit on the amount of this drug,18 tablets for 28 days. I called [facility] and they are going to profile that rx and ask that we send over a new one for the #18

## 2024-10-03 NOTE — TELEPHONE ENCOUNTER
PA received for Estradiol 10MCG tablets,[place] [employer], preferred: DAILY DOSE OF .6500Insurance prefers [id number] - ESTRADIOL CRE 0.01%, [id number] - YUVAFEM TAB 10MCG. Previous PA was denied. PA attempted again, pending.

## 2024-10-03 NOTE — ADDENDUM NOTE
Addended by: CONRAD PEARL on: 10/3/2024 11:58 AM     Modules accepted: Orders     How Severe Are Your Spot(S)?: moderate Hpi Title: Evaluation of Skin Lesions Year Removed: 1900

## 2024-11-05 ENCOUNTER — OFFICE VISIT (OUTPATIENT)
Age: 54
End: 2024-11-05
Payer: COMMERCIAL

## 2024-11-05 VITALS
SYSTOLIC BLOOD PRESSURE: 118 MMHG | DIASTOLIC BLOOD PRESSURE: 76 MMHG | BODY MASS INDEX: 29.72 KG/M2 | HEIGHT: 66 IN | WEIGHT: 184.9 LBS

## 2024-11-05 DIAGNOSIS — M79.641 BILATERAL HAND PAIN: ICD-10-CM

## 2024-11-05 DIAGNOSIS — M79.642 BILATERAL HAND PAIN: ICD-10-CM

## 2024-11-05 DIAGNOSIS — M18.10 ARTHRITIS OF CARPOMETACARPAL (CMC) JOINT OF THUMB: Primary | ICD-10-CM

## 2024-11-05 NOTE — PROGRESS NOTES
"                                                                 [facility]     Office Visit       Date: 11/05/2024   Patient Name: Laine Hernandez  MRN: [id number]  YOB: 1970    Referring Physician: Carina Chan MD     Chief Complaint:   Chief Complaint   Patient presents with    Right Hand - Pain    Left Hand - Pain     History of Present Illness:   Laine Hernandez is a 54 y.o. female left-hand-dominant presented clinic complaints of bilateral basilar thumb pain, left greater than right.  She reports symptoms have been present for over 2 years but have been worsening within the past 6 months.  She endorses pain and swelling to the base of her thumbs.  She has been treated by another provider with prior injections, therapy, and bracing.  He is provided temporary and modest improvements.  She denies any numbness or tingling.  She is most interested in discussion of definitive treatment.    PMH: Hypercholesterolemia, melanoma of face    Subjective   Review of Systems:   Review of Systems   Constitutional: Negative.    HENT: Negative.     Eyes: Negative.    Respiratory: Negative.     Cardiovascular: Negative.    Gastrointestinal: Negative.    Endocrine: Negative.    Genitourinary: Negative.    Musculoskeletal:  Positive for arthralgias.   Skin: Negative.    Allergic/Immunologic: Negative.    Neurological: Negative.    Hematological: Negative.    Psychiatric/Behavioral: Negative.        Pertinent review of systems per HPI    I reviewed the patient's chief complaint, history of present illness, review of systems, past medical history, surgical history, family history, social history, medications and allergy list in the EMR on 11/05/2024 and agree with the findings above.    Objective    Vital Signs:   Vitals:    11/05/24 0846   BP: 118/76   Weight: 83.9 kg (184 lb 14.4 oz)   Height: 167.6 cm (65.98\")     BMI:      General: No acute distress. Alert and oriented.   Cardiovascular: " Palpable radial pulse.   Respiratory: Breathing is nonlabored.     Ortho Exam:  Examination of the bilateral upper extremity:  No skin lesions or ecchymosis. No edema.  There is a Z deformity noted to the right hand.  No thenar, hypothenar, or interosseous wasting appreciated           Tender to palpation over the thumb CMC joints worse volarly.           Nontender to palpation over the scaphoid  Full and painless active flexion/extension/pronosupination of the wrist  Able to make a composite fist  positive Thumb CMC joint grind   positive Thumb MCP joint hyper extension, 25 to 30 degrees on the left and 45 degrees on the right.  Firm endpoint with radial/ulnar deviation of the thumb MCP at 0 and 30 degrees of flexion  negative Finkelstein's test  Median/radial/ulnar sensory intact to light touch  Digits are warm and well-perfused    Imaging / Studies:    Imaging Results (Last 24 Hours)       Procedure Component Value Units Date/Time    XR Hand 3+ View Bilateral [[id number]] Resulted: 11/05/24 0917     Updated: 11/05/24 0917    Narrative:      Bilateral Hand X-Ray    Indication: Pain    Views:  PA, lateral, oblique    Comparison:  None    Findings:  No fractures or dislocation. No bony lesions.Normal soft tissues.   Narrowing of bilateral thumb CMC joint spaces with osteophyte formation.    The STT joints appear well-maintained.  There is a Z deformity noted to   the right thumb.     Impression:   Degenerative changes at thumb bilateral CMC joints.                Assessment / Plan    Assessment/Plan:   Laine Hernandez is a 54 y.o. female with bilateral thumb CMC arthritis.    I discussed with the patient their clinical and radiographic findings demonstrate basal joint arthritis.  We had a lengthy discussion regarding the pathophysiology of their diagnosis.  Both conservative and surgical options were discussed.  Conservative treatments in the form of: anti-inflammatories, bracing (push meta /CMC comfort cool  wrap/Neoprene thumb sleeve), formal hand therapy, and injection were presented.  Operative treatments in the form of trapeziectomy with FCR to APL suspensionplasty was also presented, including the expected postoperative course.  She has previously attempted bracing and injections by another provider without sustained improvements.  Although she is most bothered by the weakness in her thumb and less of the pain, she would like to consider surgical treatment.  I discussed that CMC arthroplasty would improve her pain, however I cannot guarantee full restoration of her thumb strength.  Additionally, she has significant hyperextension to the right thumb MCP joint and less so at the left side.  If considering operative treatment on the right side this would also include an MCP joint fusion.  The hyperextension is less prominent on the left side and therefore we will hold off on MCP joint fusion, however she does understand that this may be required in the future. I explained to her the risk, benefits, alternatives and prognosis, and she elects to proceed with left thumb CMC arthroplasty.    The risks and benefits of the procedure were discussed with the patient and/or appropriate guardian, which include but are not limited to: the risk of bleeding, pain, infection, wound complications, neurovascular damage, post-operative stiffness, tendon and/or ligament injury, persistent pain, need for additional surgeries in the future, and general risks from anesthesia. CMC arthroplasty risks: Specific risks include: persistent thumb weakness, incisional numbness, persistent pain, damage to branches of the radial artery, damage to nearby tendons. We also discussed the post-operative rehabilitation, length of immobilization, the need for therapy, and the overall expected outcomes from the procedure. Proper time was given to answer the patient's questions regarding the procedure. The patient expressed understanding. Knowing what the  risks are and what the conservative treatment is, the patient elected to forgo any further conservative treatment options and proceed with the surgical intervention. Surgical consent was obtained in the clinic and signed by myself and the patient. She will follow up with me post operatively.        ICD-10-CM ICD-9-CM   1. Arthritis of carpometacarpal (CMC) joint of thumb  M18.10 716.94   2. Bilateral hand pain  M79.641 729.5    M79.642      Follow Up:   Return for Follow Up- After surgery.      Dasia Martínez MD  [facility]

## 2024-11-07 DIAGNOSIS — E66.811 CLASS 1 OBESITY DUE TO EXCESS CALORIES WITH SERIOUS COMORBIDITY AND BODY MASS INDEX (BMI) OF 30.0 TO 30.9 IN ADULT: ICD-10-CM

## 2024-11-07 DIAGNOSIS — E66.09 CLASS 1 OBESITY DUE TO EXCESS CALORIES WITH SERIOUS COMORBIDITY AND BODY MASS INDEX (BMI) OF 30.0 TO 30.9 IN ADULT: ICD-10-CM

## 2024-11-08 RX ORDER — PHENDIMETRAZINE TARTRATE 35 MG/1
1 TABLET ORAL 3 TIMES DAILY
Qty: 90 EACH | Refills: 0 | Status: SHIPPED | OUTPATIENT
Start: 2024-11-08

## 2024-11-08 NOTE — TELEPHONE ENCOUNTER
Rx Refill Note  Requested Prescriptions     Pending Prescriptions Disp Refills    Phendimetrazine Tartrate 35 MG tablet [Pharmacy Med Name: PHENDIMETRAZINE 35 MG TABLET]       Sig: TAKE 1 TABLET BY MOUTH 3 TIMES A DAY      Last office visit with prescribing clinician: 12/7/2023   Last telemedicine visit with prescribing clinician: Visit date not found   Next office visit with prescribing clinician: 12/10/2024                         Would you like a call back once the refill request has been completed: [] Yes [] No    If the office needs to give you a call back, can they leave a voicemail: [] Yes [] No    Bridgett Peña LPN  11/08/24, 08:23 EST

## 2024-12-03 ENCOUNTER — TELEPHONE (OUTPATIENT)
Dept: OBSTETRICS AND GYNECOLOGY | Facility: CLINIC | Age: 54
End: 2024-12-03
Payer: COMMERCIAL

## 2024-12-03 ENCOUNTER — PATIENT MESSAGE (OUTPATIENT)
Dept: OBSTETRICS AND GYNECOLOGY | Facility: CLINIC | Age: 54
End: 2024-12-03
Payer: COMMERCIAL

## 2024-12-03 DIAGNOSIS — N95.8 GENITOURINARY SYNDROME OF MENOPAUSE: ICD-10-CM

## 2024-12-03 DIAGNOSIS — N94.10 FEMALE DYSPAREUNIA: ICD-10-CM

## 2024-12-03 DIAGNOSIS — N95.2 POST-MENOPAUSAL ATROPHIC VAGINITIS: Primary | Chronic | ICD-10-CM

## 2024-12-03 RX ORDER — ESTRADIOL 10 UG/1
1 INSERT VAGINAL DAILY
Qty: 30 TABLET | Refills: 11 | Status: SHIPPED | OUTPATIENT
Start: 2024-12-03 | End: 2025-12-03

## 2024-12-03 NOTE — TELEPHONE ENCOUNTER
Dr. Urbina had originally prescribed Insert 1 tablet into the vagina Daily for 360 days and the insurance denied that and the most they would cover is #18 I will pend this rx to Dr. Urbina

## 2024-12-03 NOTE — TELEPHONE ENCOUNTER
Dr. Urbina sent your estradiol Vagifem 10MCG to [facility] today. If you need any further help please let me know. My chart message to the pt.

## 2024-12-06 ENCOUNTER — LAB (OUTPATIENT)
Facility: HOSPITAL | Age: 54
End: 2024-12-06
Payer: COMMERCIAL

## 2024-12-06 DIAGNOSIS — E78.00 HYPERCHOLESTEROLEMIA: ICD-10-CM

## 2024-12-06 DIAGNOSIS — R73.09 ABNORMAL GLUCOSE: ICD-10-CM

## 2024-12-06 DIAGNOSIS — E55.9 VITAMIN D DEFICIENCY: ICD-10-CM

## 2024-12-06 LAB
25(OH)D3 SERPL-MCNC: 43.6 NG/ML (ref 30–100)
ALBUMIN SERPL-MCNC: 4.3 G/DL (ref 3.5–5.2)
ALBUMIN/GLOB SERPL: 1.3 G/DL
ALP SERPL-CCNC: 80 U/L (ref 39–117)
ALT SERPL W P-5'-P-CCNC: 14 U/L (ref 1–33)
ANION GAP SERPL CALCULATED.3IONS-SCNC: 9.1 MMOL/L (ref 5–15)
AST SERPL-CCNC: 19 U/L (ref 1–32)
BACTERIA UR QL AUTO: NORMAL /HPF
BILIRUB SERPL-MCNC: 0.5 MG/DL (ref 0–1.2)
BILIRUB UR QL STRIP: NEGATIVE
BUN SERPL-MCNC: 17 MG/DL (ref 6–20)
BUN/CREAT SERPL: 21.5 (ref 7–25)
CALCIUM SPEC-SCNC: 9.7 MG/DL (ref 8.6–10.5)
CHLORIDE SERPL-SCNC: 104 MMOL/L (ref 98–107)
CHOLEST SERPL-MCNC: 238 MG/DL (ref 0–200)
CLARITY UR: CLEAR
CO2 SERPL-SCNC: 27.9 MMOL/L (ref 22–29)
COLOR UR: YELLOW
CREAT SERPL-MCNC: 0.79 MG/DL (ref 0.57–1)
EGFRCR SERPLBLD CKD-EPI 2021: 89 ML/MIN/1.73
GLOBULIN UR ELPH-MCNC: 3.3 GM/DL
GLUCOSE SERPL-MCNC: 98 MG/DL (ref 65–99)
GLUCOSE UR STRIP-MCNC: NEGATIVE MG/DL
HBA1C MFR BLD: 5.2 % (ref 4.8–5.6)
HDLC SERPL-MCNC: 68 MG/DL (ref 40–60)
HGB UR QL STRIP.AUTO: NEGATIVE
HYALINE CASTS UR QL AUTO: NORMAL /LPF
KETONES UR QL STRIP: NEGATIVE
LDLC SERPL CALC-MCNC: 144 MG/DL (ref 0–100)
LDLC/HDLC SERPL: 2.07 {RATIO}
LEUKOCYTE ESTERASE UR QL STRIP.AUTO: NEGATIVE
NITRITE UR QL STRIP: NEGATIVE
PH UR STRIP.AUTO: 6 [PH] (ref 5–8)
POTASSIUM SERPL-SCNC: 4.6 MMOL/L (ref 3.5–5.2)
PROT SERPL-MCNC: 7.6 G/DL (ref 6–8.5)
PROT UR QL STRIP: NEGATIVE
RBC # UR STRIP: NORMAL /HPF
REF LAB TEST METHOD: NORMAL
SODIUM SERPL-SCNC: 141 MMOL/L (ref 136–145)
SP GR UR STRIP: 1.02 (ref 1–1.03)
SQUAMOUS #/AREA URNS HPF: NORMAL /HPF
TRIGL SERPL-MCNC: 147 MG/DL (ref 0–150)
TSH SERPL DL<=0.05 MIU/L-ACNC: 1.05 UIU/ML (ref 0.27–4.2)
UROBILINOGEN UR QL STRIP: NORMAL
VIT B12 BLD-MCNC: 811 PG/ML (ref 211–946)
VLDLC SERPL-MCNC: 26 MG/DL (ref 5–40)
WBC # UR STRIP: NORMAL /HPF

## 2024-12-06 PROCEDURE — 83036 HEMOGLOBIN GLYCOSYLATED A1C: CPT

## 2024-12-06 PROCEDURE — 81001 URINALYSIS AUTO W/SCOPE: CPT

## 2024-12-06 PROCEDURE — 80050 GENERAL HEALTH PANEL: CPT

## 2024-12-06 PROCEDURE — 82607 VITAMIN B-12: CPT

## 2024-12-06 PROCEDURE — 82043 UR ALBUMIN QUANTITATIVE: CPT

## 2024-12-06 PROCEDURE — 82570 ASSAY OF URINE CREATININE: CPT

## 2024-12-06 PROCEDURE — 80061 LIPID PANEL: CPT

## 2024-12-06 PROCEDURE — 82306 VITAMIN D 25 HYDROXY: CPT

## 2024-12-07 LAB
ALBUMIN UR-MCNC: <1.2 MG/DL
BASOPHILS # BLD AUTO: 0.02 10*3/MM3 (ref 0–0.2)
BASOPHILS NFR BLD AUTO: 0.4 % (ref 0–1.5)
CREAT UR-MCNC: 98.3 MG/DL
DEPRECATED RDW RBC AUTO: 40.4 FL (ref 37–54)
EOSINOPHIL # BLD AUTO: 0.07 10*3/MM3 (ref 0–0.4)
EOSINOPHIL NFR BLD AUTO: 1.4 % (ref 0.3–6.2)
ERYTHROCYTE [DISTWIDTH] IN BLOOD BY AUTOMATED COUNT: 12.2 % (ref 12.3–15.4)
HCT VFR BLD AUTO: 39.1 % (ref 34–46.6)
HGB BLD-MCNC: 13.7 G/DL (ref 12–15.9)
IMM GRANULOCYTES # BLD AUTO: 0.02 10*3/MM3 (ref 0–0.05)
IMM GRANULOCYTES NFR BLD AUTO: 0.4 % (ref 0–0.5)
LYMPHOCYTES # BLD AUTO: 1.63 10*3/MM3 (ref 0.7–3.1)
LYMPHOCYTES NFR BLD AUTO: 32.5 % (ref 19.6–45.3)
MCH RBC QN AUTO: 32.1 PG (ref 26.6–33)
MCHC RBC AUTO-ENTMCNC: 35 G/DL (ref 31.5–35.7)
MCV RBC AUTO: 91.6 FL (ref 79–97)
MICROALBUMIN/CREAT UR: NORMAL MG/G{CREAT}
MONOCYTES # BLD AUTO: 0.49 10*3/MM3 (ref 0.1–0.9)
MONOCYTES NFR BLD AUTO: 9.8 % (ref 5–12)
NEUTROPHILS NFR BLD AUTO: 2.78 10*3/MM3 (ref 1.7–7)
NEUTROPHILS NFR BLD AUTO: 55.5 % (ref 42.7–76)
NRBC BLD AUTO-RTO: 0 /100 WBC (ref 0–0.2)
PLATELET # BLD AUTO: 286 10*3/MM3 (ref 140–450)
PMV BLD AUTO: 10.2 FL (ref 6–12)
RBC # BLD AUTO: 4.27 10*6/MM3 (ref 3.77–5.28)
WBC NRBC COR # BLD AUTO: 5.01 10*3/MM3 (ref 3.4–10.8)

## 2024-12-10 ENCOUNTER — OFFICE VISIT (OUTPATIENT)
Dept: INTERNAL MEDICINE | Facility: CLINIC | Age: 54
End: 2024-12-10
Payer: COMMERCIAL

## 2024-12-10 VITALS
HEIGHT: 66 IN | DIASTOLIC BLOOD PRESSURE: 80 MMHG | WEIGHT: 185.4 LBS | OXYGEN SATURATION: 99 % | TEMPERATURE: 98.2 F | BODY MASS INDEX: 29.8 KG/M2 | HEART RATE: 76 BPM | SYSTOLIC BLOOD PRESSURE: 110 MMHG

## 2024-12-10 DIAGNOSIS — F51.01 PRIMARY INSOMNIA: ICD-10-CM

## 2024-12-10 DIAGNOSIS — E78.2 MIXED HYPERLIPIDEMIA: Chronic | ICD-10-CM

## 2024-12-10 DIAGNOSIS — M18.10 ARTHRITIS OF CARPOMETACARPAL (CMC) JOINT OF THUMB: ICD-10-CM

## 2024-12-10 DIAGNOSIS — E66.3 OVERWEIGHT WITH BODY MASS INDEX (BMI) OF 29 TO 29.9 IN ADULT: Chronic | ICD-10-CM

## 2024-12-10 DIAGNOSIS — S43.432D SUPERIOR GLENOID LABRUM LESION OF LEFT SHOULDER, SUBSEQUENT ENCOUNTER: ICD-10-CM

## 2024-12-10 DIAGNOSIS — Z98.890 STATUS POST LEFT ROTATOR CUFF REPAIR: ICD-10-CM

## 2024-12-10 DIAGNOSIS — Z00.00 ANNUAL PHYSICAL EXAM: Primary | ICD-10-CM

## 2024-12-10 DIAGNOSIS — E78.00 HYPERCHOLESTEROLEMIA: Chronic | ICD-10-CM

## 2024-12-10 DIAGNOSIS — C43.30 MALIGNANT MELANOMA OF FACE EXCLUDING EYELID, NOSE, LIP, AND EAR: Chronic | ICD-10-CM

## 2024-12-10 DIAGNOSIS — R03.0 ELEVATED BLOOD PRESSURE READING WITHOUT DIAGNOSIS OF HYPERTENSION: Chronic | ICD-10-CM

## 2024-12-10 DIAGNOSIS — E55.9 VITAMIN D DEFICIENCY: ICD-10-CM

## 2024-12-10 DIAGNOSIS — C44.311 BASAL CELL CARCINOMA (BCC) OF SKIN OF NOSE: Chronic | ICD-10-CM

## 2024-12-10 DIAGNOSIS — L65.9 HAIR LOSS: Chronic | ICD-10-CM

## 2024-12-10 DIAGNOSIS — R73.09 ABNORMAL GLUCOSE: ICD-10-CM

## 2024-12-10 PROBLEM — E66.09 CLASS 1 OBESITY DUE TO EXCESS CALORIES WITH SERIOUS COMORBIDITY AND BODY MASS INDEX (BMI) OF 30.0 TO 30.9 IN ADULT: Status: RESOLVED | Noted: 2022-06-21 | Resolved: 2024-12-10

## 2024-12-10 PROBLEM — E66.811 CLASS 1 OBESITY DUE TO EXCESS CALORIES WITH SERIOUS COMORBIDITY AND BODY MASS INDEX (BMI) OF 30.0 TO 30.9 IN ADULT: Status: RESOLVED | Noted: 2022-06-21 | Resolved: 2024-12-10

## 2024-12-10 PROCEDURE — 90656 IIV3 VACC NO PRSV 0.5 ML IM: CPT | Performed by: INTERNAL MEDICINE

## 2024-12-10 PROCEDURE — 90471 IMMUNIZATION ADMIN: CPT | Performed by: INTERNAL MEDICINE

## 2024-12-10 PROCEDURE — 99396 PREV VISIT EST AGE 40-64: CPT | Performed by: INTERNAL MEDICINE

## 2024-12-10 PROCEDURE — 93000 ELECTROCARDIOGRAM COMPLETE: CPT | Performed by: INTERNAL MEDICINE

## 2024-12-10 RX ORDER — UBIDECARENONE 100 MG
100 CAPSULE ORAL DAILY
COMMUNITY

## 2024-12-10 RX ORDER — ASCORBIC ACID 125 MG
1 TABLET,CHEWABLE ORAL DAILY
COMMUNITY

## 2024-12-10 RX ORDER — ROSUVASTATIN CALCIUM 5 MG/1
5 TABLET, COATED ORAL NIGHTLY
Qty: 90 TABLET | Refills: 1 | Status: SHIPPED | OUTPATIENT
Start: 2024-12-10

## 2024-12-10 RX ORDER — PHENDIMETRAZINE TARTRATE 35 MG/1
1 TABLET ORAL 3 TIMES DAILY
Qty: 90 EACH | Refills: 2 | Status: SHIPPED | OUTPATIENT
Start: 2024-12-10 | End: 2024-12-10

## 2024-12-10 RX ORDER — PHENDIMETRAZINE TARTRATE 35 MG/1
1 TABLET ORAL 3 TIMES DAILY
Start: 2024-12-10

## 2024-12-10 RX ORDER — MINOXIDIL 10 MG/1
5 TABLET ORAL DAILY
Qty: 45 TABLET | Refills: 1 | Status: SHIPPED | OUTPATIENT
Start: 2024-12-10

## 2024-12-10 NOTE — PROGRESS NOTES
Preventative Annual Visit    Laine Hernandez  1970   [id number]    Patient Care Team:  Carina Chan MD as PCP - General (Internal Medicine)  Franky Mckeon MD as Consulting Physician (Cardiology)  Simba Serrano MD as Consulting Physician (Orthopedic Surgery)  Georgia Rasmussen PA-C as Physician Assistant (Physician Assistant)  Dasia Martínez MD as Consulting Physician (Orthopedic Surgery)    Chief Complaint::   Chief Complaint   Patient presents with    Annual Exam    Hyperlipidemia    Allergies        Subjective   History of Present Illness  The patient presents for an annual physical.    She has a history of left foot injury, which healed in 04/2024. She underwent left rotator cuff surgery in 05/2024 or 06/2024 and is now scheduled for hand surgery due to bone-on-bone contact in both hands. Her shoulder has good range of motion. She reports severe pain in her hands, which is managed with diclofenac twice daily, providing significant relief without causing heartburn. Post-surgery, she experienced severe pain, which was managed with Tylenol and Advil, although she found them ineffective. She used THC gummies for a week to manage the pain. Hydrocodone was later prescribed but was ineffective. She reports no side effects from hydrocodone.    Two months ago, she developed floaters in her left eye, which she associated with high blood pressure readings of 160s/30s lasting about 2 days. She attributed this to consuming Asian food with high soy sauce content. She has not monitored her blood pressure recently as the floaters have resolved. She managed this by eliminating salt from her diet for several weeks and increasing her exercise.    She has been taking phendimetrazine, which has prevented her weight from reaching 300 pounds. She reports that it also improves her concentration. She has been on this medication for 10 years and took a dose today. She is curious about the duration of  treatment with this medication. She engages in physical activity at least 5 days a week, including walking a mile with her dogs. She has made efforts to reduce her sugar intake and cooks healthy meals at home, consuming salads almost every night. She believes her metabolism has slowed due to menopause.    She reports poor sleep quality but does not believe it is related to phendimetrazine. She avoids coffee after 2:00 PM. She typically goes to bed between 9:30 and 10:00 PM and falls asleep within an hour if she takes trazodone. She does not feel hungover the next morning if she takes it before midnight. She is reluctant to take it nightly due to her already high medication load. She takes trazodone as needed at night, which aids her sleep.    She has a diagnosis of basal cell carcinoma on her nose, which is scheduled for removal in 01/2025. She is under the care of [facility]. The lesion was previously treated with cauterization last year but did not heal. A biopsy confirmed the diagnosis of basal cell carcinoma.    She has been using Vagifem estrogen suppositories 4 times a week, which have significantly improved her vaginal dryness. She has been using minoxidil for hair loss for the past 6 months, which has been effective without causing lesions. She reports no itching or irritation on her scalp. She is currently taking half a tablet and is running low on her supply. She has been taking vitamin D 2000 units daily and vitamin K2 for the past 2 months. She started taking B12 supplements about 2 months ago.    Supplemental Information  She had COVID-19 in 10/2024, which took a long time to resolve.    FAMILY HISTORY  Her brother has high blood pressure. Her father had a heart attack and high cholesterol. Her mother had atrial fibrillation and heart disease. Her oldest sister has high cholesterol and is overweight. No family history of high blood pressure in her sisters or brother.    MEDICATIONS  Current:  diclofenac, phendimetrazine, trazodone, Vagifem, minoxidil, vitamin D, vitamin K2, B12 supplements  Past: hydrocodone    IMMUNIZATIONS  She is up to date on Tdap. She will receive influenza vaccine today.       Laine Hernandez is a 54 y.o. female who presents for an Annual Wellness Visit.    CHRONIC CONDITIONS    Patient Active Problem List   Diagnosis    Dyspareunia due to medical condition in female    Post-menopausal atrophic vaginitis    Migraine without aura and without status migrainosus, not intractable    Primary osteoarthritis of both hands    Palpitations    Family history of colon cancer in mother    Menopausal and postmenopausal disorder    Precordial pain    Abnormal glucose    Sudden idiopathic hearing loss of right ear with unrestricted hearing of left ear    Dizziness    Ganglion cyst of flexor tendon sheath of finger of left hand    Cough in adult    Dysgeusia    Malignant melanoma of face excluding eyelid, nose, lip, and ear    Mixed hyperlipidemia    Actinic keratosis of left cheek    Acquired keratosis pilaris    Lesion of nose    Primary insomnia    Nontraumatic tear of plantar fascia    Injury of left rotator cuff    Impingement syndrome of left shoulder    Bursitis of left shoulder    Left shoulder pain    Nontraumatic complete tear of left rotator cuff    Biceps tendinitis of left upper extremity    Superior glenoid labrum lesion of left shoulder    Status post left rotator cuff repair    Arthritis of carpometacarpal (CMC) joint of thumb    Elevated blood pressure reading without diagnosis of hypertension    Overweight with body mass index (BMI) of 29 to 29.9 in adult    Hair loss    Basal cell carcinoma (BCC) of skin of nose        Past Medical History:   Diagnosis Date    Abnormal Pap smear of cervix 2003    ADHD (attention deficit hyperactivity disorder) 2008    Was on meds for a year    Allergic 2005    The usual pollen allergies, plus sulfa antibiotics and latex    Ankle sprain      Arthritis     Cancer 2019    On face - removed in 2019    Cataract 2019    Cervical dysplasia 2003    Class 1 obesity due to excess calories with serious comorbidity and body mass index (BMI) of 30.0 to 30.9 in adult 06/21/2022    Class 1 obesity due to excess calories with serious comorbidity and body mass index (BMI) of 31.0 to 31.9 in adult 06/21/2022    Depression     Diverticulosis 2011    Just one episode, none since    Fracture, foot 2007    GERD (gastroesophageal reflux disease)     Headache     Hip arthrosis July 2020    HPV (human papilloma virus) infection 2003    Hyperlipidemia 2023    Menorrhalgia 2012    uterine ablation performed    Migraine 2009    I get ~6 migraines/year that last 24-48 hrs    Neuroma of foot 2013    PMS (premenstrual syndrome) 1986    Rotator cuff syndrome 2012    Tendinitis of knee 1996    I tried running for daily exercise. My knees said no.    Tennis elbow 2011    From walking strong dogs    Varicella 1985       Past Surgical History:   Procedure Laterality Date    AUGMENTATION MAMMAPLASTY Bilateral 2002    SALINE    BREAST SURGERY  2002    augmentation     BUNIONECTOMY      2014 and 2015    COLONOSCOPY  2024    ENDOMETRIAL ABLATION  2005    EYE SURGERY  2000    Lasik    FRACTURE SURGERY  2014 and 2015    Not fractures but bunionectomies    ROTATOR CUFF REPAIR Left 06/17/2024    Dr.Brent Serrano    SKIN CANCER EXCISION  2018    R cheek melanoma       Family History   Problem Relation Age of Onset    Arthritis Mother     Colon cancer Mother 81    Heart attack Mother 84        cause of death    COPD Mother     Alcohol abuse Mother     Anxiety disorder Mother     Cancer Mother         Colon Cancer    Heart disease Mother     Miscarriages / Stillbirths Mother         Stillbirth    Arthritis Father     Diabetes Father         oral med    Hyperlipidemia Father     Hypertension Father     Stroke Father 76    Obesity Father     Meniere's disease Father     Heart attack Father     Alcohol  abuse Father     Hearing loss Father     Heart disease Father     Hyperlipidemia Sister     Meniere's disease Sister     Hearing loss Sister     Miscarriages / Stillbirths Sister         Stillbirth    Sleep apnea Brother     Diabetes Paternal Grandfather     Breast cancer Neg Hx     Ovarian cancer Neg Hx     Uterine cancer Neg Hx        Social History     Socioeconomic History    Marital status:    Tobacco Use    Smoking status: Never     Passive exposure: Past    Smokeless tobacco: Never    Tobacco comments:     Both parents smoked when I was growing up   Vaping Use    Vaping status: Never Used   Substance and Sexual Activity    Alcohol use: Yes     Alcohol/week: 3.0 standard drinks of alcohol     Types: 1 Glasses of wine, 2 Drinks containing 0.5 oz of alcohol per week     Comment: 1 aweek    Drug use: Never    Sexual activity: Yes     Partners: Male     Birth control/protection: Vasectomy     Comment: My vaginal atrophy is so bad, intercourse is not possible.       Allergies   Allergen Reactions    Latex Itching    Sulfa Antibiotics Other (See Comments)     Per pt - swollen lymph nodes         Current Outpatient Medications:     Ascorbic Acid (VITAMIN C PO), Take  by mouth Daily., Disp: , Rfl:     Cholecalciferol (VITAMIN D3 PO), Take 1,000 Units by mouth 2 (Two) Times a Day. Not in summer months, Disp: , Rfl:     coenzyme Q10 100 MG capsule, Take 1 capsule by mouth Daily., Disp: , Rfl:     diclofenac (VOLTAREN) 50 MG EC tablet, TAKE ONE TABLET BY MOUTH TWICE A DAY AS NEEDED FOR PAIN, Disp: 60 tablet, Rfl: 5    estradiol (Vagifem) 10 MCG tablet vaginal tablet, Insert 1 tablet into the vagina Daily., Disp: 30 tablet, Rfl: 11    fexofenadine (ALLEGRA) 180 MG tablet, Take 1 tablet by mouth Daily As Needed (allergies)., Disp: , Rfl:     Lactobacillus (ACIDOPHILUS PO), Take  by mouth Daily., Disp: , Rfl:     magnesium, as, gluconate (MAGONATE) 500 (27 Mg) MG tablet, Take 1 tablet by mouth Daily., Disp: 90  "tablet, Rfl: 1    meclizine (ANTIVERT) 12.5 MG tablet, As Needed., Disp: , Rfl:     Menaquinone-7 (Vitamin K2) 100 MCG capsule, Take 1 capsule by mouth Daily., Disp: , Rfl:     minoxidil (LONITEN) 10 MG tablet, Take 0.5 tablets by mouth Daily., Disp: 45 tablet, Rfl: 1    multivitamin with minerals tablet tablet, Take 1 tablet by mouth Daily., Disp: , Rfl:     niacin 500 MG tablet, Take 3 tablets by mouth Daily., Disp: , Rfl:     Phendimetrazine Tartrate 35 MG tablet, Take 1 tablet by mouth 3 (Three) Times a Day., Disp: , Rfl:     Progesterone (Prometrium) 100 MG capsule, Take 1 capsule by mouth Daily., Disp: 30 capsule, Rfl: 11    traZODone (DESYREL) 50 MG tablet, TAKE ONE TABLET BY MOUTH ONCE NIGHTLY, Disp: 30 tablet, Rfl: 5    rosuvastatin (CRESTOR) 5 MG tablet, Take 1 tablet by mouth Every Night., Disp: 90 tablet, Rfl: 1    Immunization History   Administered Date(s) Administered    COVID-19 (MODERNA) 1st,2nd,3rd Dose Monovalent 03/31/2021, 05/04/2021, 12/23/2021    COVID-19 (PFIZER) BIVALENT 12+YRS 11/01/2022    Flu Vaccine Quad PF >36MO 09/30/2019    Fluzone  >6mos 12/10/2024    Fluzone (or Fluarix & Flulaval for VFC) >6mos 09/30/2019, 09/29/2021, 12/21/2022, 12/07/2023    Shingrix 12/16/2021, 06/24/2022    Tdap 04/26/2019        Health Maintenance Due   Topic Date Due    HEPATITIS C SCREENING  Never done    COVID-19 Vaccine (5 - 2024-25 season) 09/01/2024    ANNUAL PHYSICAL  12/07/2024        Review of Systems     Vital Signs  Vitals:    12/10/24 1142   BP: 110/80   BP Location: Left arm   Patient Position: Sitting   Cuff Size: Adult   Pulse: 76   Temp: 98.2 °F (36.8 °C)   TempSrc: Infrared   SpO2: 99%   Weight: 84.1 kg (185 lb 6.4 oz)   Height: 167.6 cm (65.98\")   PainSc: 0-No pain     BMI is >= 25 and <30. (Overweight) The following options were offered after discussion;: weight loss educational material (shared in after visit summary), exercise counseling/recommendations, nutrition " counseling/recommendations, pharmacological intervention options, and Information on healthy weight added to patient's after visit summary.     Physical Exam  Vitals and nursing note reviewed.   Constitutional:       Appearance: She is well-developed.   HENT:      Head: Normocephalic.   Eyes:      Conjunctiva/sclera: Conjunctivae normal.      Pupils: Pupils are equal, round, and reactive to light.   Neck:      Thyroid: No thyromegaly.   Cardiovascular:      Rate and Rhythm: Normal rate and regular rhythm.      Heart sounds: Normal heart sounds.   Pulmonary:      Effort: Pulmonary effort is normal.      Breath sounds: Normal breath sounds. No wheezing.   Chest:   Breasts:     Right: No inverted nipple, mass, nipple discharge, skin change or tenderness.      Left: No inverted nipple, mass, nipple discharge, skin change or tenderness.   Abdominal:      General: Bowel sounds are normal.      Palpations: Abdomen is soft.      Tenderness: There is no abdominal tenderness.   Musculoskeletal:         General: No tenderness. Normal range of motion.      Right hand: Deformity present.      Left hand: Deformity present.      Cervical back: Normal range of motion and neck supple.      Right lower leg: No edema.      Left lower leg: No edema.   Lymphadenopathy:      Cervical: No cervical adenopathy.   Skin:     General: Skin is warm and dry.      Findings: No rash.   Neurological:      Mental Status: She is alert and oriented to person, place, and time.      Cranial Nerves: No cranial nerve deficit.      Sensory: No sensory deficit.      Coordination: Coordination normal.      Gait: Gait normal.   Psychiatric:         Attention and Perception: Attention normal.         Mood and Affect: Mood normal.         Speech: Speech normal.         Behavior: Behavior normal.         Thought Content: Thought content normal.         Judgment: Judgment normal.            ECG 12 Lead    Date/Time: 12/10/2024 12:40 PM  Performed by: Dustin  Carina MARLEY MD    Authorized by: Carina Chan MD  Comparison: compared with previous ECG   Similar to previous ECG  Rhythm: sinus rhythm  Rate: normal  BPM: 73  Conduction: conduction normal  ST Segments: ST segments normal  T Waves: T waves normal  QRS axis: normal  Other findings: non-specific ST-T wave changes    Clinical impression: non-specific ECG           Fall Risk Screen:  LOPEZ Fall Risk Assessment has not been completed.    Health Habits and Functional and Cognitive Screening:       No data to display                Smoking Status:  Social History     Tobacco Use   Smoking Status Never    Passive exposure: Past   Smokeless Tobacco Never   Tobacco Comments    Both parents smoked when I was growing up       Alcohol Consumption:  Social History     Substance and Sexual Activity   Alcohol Use Yes    Alcohol/week: 3.0 standard drinks of alcohol    Types: 1 Glasses of wine, 2 Drinks containing 0.5 oz of alcohol per week    Comment: 1 aweek       Depression [place]  PHQ-9:        12/10/2024    11:42 AM   PHQ-2/PHQ-9 Depression Screening   Little interest or pleasure in doing things Not at all   Feeling down, depressed, or hopeless Not at all   How difficult have these problems made it for you to do your work, take care of things at home, or get along with other people? Not difficult at all           Labs  Results for orders placed or performed in visit on 12/06/24   Comprehensive Metabolic Panel    Collection Time: 12/06/24 11:43 AM    Specimen: Blood   Result Value Ref Range    Glucose 98 65 - 99 mg/dL    BUN 17 6 - 20 mg/dL    Creatinine 0.79 0.57 - 1.00 mg/dL    Sodium 141 136 - 145 mmol/L    Potassium 4.6 3.5 - 5.2 mmol/L    Chloride 104 98 - 107 mmol/L    CO2 27.9 22.0 - 29.0 mmol/L    Calcium 9.7 8.6 - 10.5 mg/dL    Total Protein 7.6 6.0 - 8.5 g/dL    Albumin 4.3 3.5 - 5.2 g/dL    ALT (SGPT) 14 1 - 33 U/L    AST (SGOT) 19 1 - 32 U/L    Alkaline Phosphatase 80 39 - 117 U/L    Total Bilirubin 0.5 0.0 -  1.2 mg/dL    Globulin 3.3 gm/dL    A/G Ratio 1.3 g/dL    BUN/Creatinine Ratio 21.5 7.0 - 25.0    Anion Gap 9.1 5.0 - 15.0 mmol/L    eGFR 89.0 >60.0 mL/min/1.73   Hemoglobin A1c    Collection Time: 12/06/24 11:43 AM    Specimen: Blood   Result Value Ref Range    Hemoglobin A1C 5.20 4.80 - 5.60 %   Lipid Panel    Collection Time: 12/06/24 11:43 AM    Specimen: Blood   Result Value Ref Range    Total Cholesterol 238 (H) 0 - 200 mg/dL    Triglycerides 147 0 - 150 mg/dL    HDL Cholesterol 68 (H) 40 - 60 mg/dL    LDL Cholesterol  144 (H) 0 - 100 mg/dL    VLDL Cholesterol 26 5 - 40 mg/dL    LDL/HDL Ratio 2.07    Microalbumin / Creatinine Urine Ratio - Urine, Clean Catch    Collection Time: 12/06/24 11:43 AM    Specimen: Urine, Clean Catch   Result Value Ref Range    Microalbumin/Creatinine Ratio      Creatinine, Urine 98.3 mg/dL    Microalbumin, Urine <1.2 mg/dL   TSH    Collection Time: 12/06/24 11:43 AM    Specimen: Blood   Result Value Ref Range    TSH 1.050 0.270 - 4.200 uIU/mL   Vitamin B12    Collection Time: 12/06/24 11:43 AM    Specimen: Blood   Result Value Ref Range    Vitamin B-12 811 211 - 946 pg/mL   Vitamin D,25-Hydroxy    Collection Time: 12/06/24 11:43 AM    Specimen: Blood   Result Value Ref Range    25 Hydroxy, Vitamin D 43.6 30.0 - 100.0 ng/ml   CBC Auto Differential    Collection Time: 12/06/24 11:43 AM    Specimen: Blood   Result Value Ref Range    WBC 5.01 3.40 - 10.80 10*3/mm3    RBC 4.27 3.77 - 5.28 10*6/mm3    Hemoglobin 13.7 12.0 - 15.9 g/dL    Hematocrit 39.1 34.0 - 46.6 %    MCV 91.6 79.0 - 97.0 fL    MCH 32.1 26.6 - 33.0 pg    MCHC 35.0 31.5 - 35.7 g/dL    RDW 12.2 (L) 12.3 - 15.4 %    RDW-SD 40.4 37.0 - 54.0 fl    MPV 10.2 6.0 - 12.0 fL    Platelets 286 140 - 450 10*3/mm3    Neutrophil % 55.5 42.7 - 76.0 %    Lymphocyte % 32.5 19.6 - 45.3 %    Monocyte % 9.8 5.0 - 12.0 %    Eosinophil % 1.4 0.3 - 6.2 %    Basophil % 0.4 0.0 - 1.5 %    Immature Grans % 0.4 0.0 - 0.5 %    Neutrophils, Absolute  2.78 1.70 - 7.00 10*3/mm3    Lymphocytes, Absolute 1.63 0.70 - 3.10 10*3/mm3    Monocytes, Absolute 0.49 0.10 - 0.90 10*3/mm3    Eosinophils, Absolute 0.07 0.00 - 0.40 10*3/mm3    Basophils, Absolute 0.02 0.00 - 0.20 10*3/mm3    Immature Grans, Absolute 0.02 0.00 - 0.05 10*3/mm3    nRBC 0.0 0.0 - 0.2 /100 WBC   Urinalysis without microscopic (no culture) - Urine, Clean Catch    Collection Time: 12/06/24 11:43 AM    Specimen: Urine, Clean Catch   Result Value Ref Range    Color, UA Yellow Yellow, Straw    Appearance, UA Clear Clear    pH, UA 6.0 5.0 - 8.0    Specific Gravity, UA 1.020 1.005 - 1.030    Glucose, UA Negative Negative    Ketones, UA Negative Negative    Bilirubin, UA Negative Negative    Blood, UA Negative Negative    Protein, UA Negative Negative    Leuk Esterase, UA Negative Negative    Nitrite, UA Negative Negative    Urobilinogen, UA 0.2 E.U./dL 0.2 - 1.0 E.U./dL   Urinalysis, Microscopic Only - Urine, Clean Catch    Collection Time: 12/06/24 11:43 AM    Specimen: Urine, Clean Catch   Result Value Ref Range    RBC, UA 0-2 None Seen, 0-2 /HPF    WBC, UA 0-2 None Seen, 0-2 /HPF    Bacteria, UA None Seen None Seen /HPF    Squamous Epithelial Cells, UA 0-2 None Seen, 0-2 /HPF    Hyaline Casts, UA None Seen None Seen /LPF    Methodology Automated Microscopy       Results  Laboratory Studies  Vitamin D level is 43.6. Blood sugar is 98. A1c decreased from 5.7 to 5.2. LDL cholesterol increased to 144, HDL cholesterol is 68, triglycerides improved. Kidney function is normal. Liver enzymes are normal. Potassium is normal. Thyroid function is normal. B12 levels are normal. White blood cell counts, red blood cells, and platelets are all normal. Urinalysis is normal, no infection, no blood in the urine, no white cells in the urine.    Assessment & Plan     Patient Self-Management and Personalized Health Advice    The patient has been provided counseling and guidance about: diet, exercise, weight management, and  prevention of cardiac or vascular disease and preventive services including:   Annual Wellness Visit (AWV).  Patient Instructions   Problem List Items Addressed This Visit          Cardiac and Vasculature    Mixed hyperlipidemia (Chronic)    Relevant Medications    rosuvastatin (CRESTOR) 5 MG tablet    Other Relevant Orders    Comprehensive Metabolic Panel    Lipid Panel    ECG 12 Lead    Elevated blood pressure reading without diagnosis of hypertension (Chronic)       Endocrine and Metabolic    Abnormal glucose    Relevant Orders    Hemoglobin A1c (Completed)       Hematology and Neoplasia    Malignant melanoma of face excluding eyelid, nose, lip, and ear (Chronic)    Overview     R cheek excision 2018.         Basal cell carcinoma (BCC) of skin of nose (Chronic)    Overview     Nose. Seeing [facility].            Musculoskeletal and Injuries    Superior glenoid labrum lesion of left shoulder    Status post left rotator cuff repair    Arthritis of carpometacarpal (CMC) joint of thumb       Skin    Hair loss (Chronic)       Sleep    Primary insomnia    Relevant Medications    traZODone (DESYREL) 50 MG tablet       Other    Overweight with body mass index (BMI) of 29 to 29.9 in adult (Chronic)    Relevant Medications    Phendimetrazine Tartrate 35 MG tablet     Other Visit Diagnoses       Annual physical exam    -  Primary    Vitamin D deficiency        Relevant Orders    Vitamin D,25-Hydroxy (Completed)               Diagnosis Plan   1. Annual physical exam        2. Mixed hyperlipidemia  Comprehensive Metabolic Panel    Lipid Panel    ECG 12 Lead      3. Elevated blood pressure reading without diagnosis of hypertension        4. Overweight with body mass index (BMI) of 29 to 29.9 in adult  Phendimetrazine Tartrate 35 MG tablet      5. Primary insomnia        6. Superior glenoid labrum lesion of left shoulder, subsequent encounter        7. Arthritis of carpometacarpal (CMC) joint of thumb        8.  Basal cell carcinoma (BCC) of skin of nose        9. Malignant melanoma of face excluding eyelid, nose, lip, and ear        10. Hair loss        11. Status post left rotator cuff repair        12. Abnormal glucose  Hemoglobin A1c      13. Vitamin D deficiency  Vitamin D,25-Hydroxy      14. Hypercholesterolemia  CBC & Differential    Comprehensive Metabolic Panel    Lipid Panel    Microalbumin / Creatinine Urine Ratio - Urine, Clean Catch    Urinalysis With Microscopic - Urine, Clean Catch    TSH    Vitamin B12        Outpatient Encounter Medications as of 12/10/2024   Medication Sig Dispense Refill    Ascorbic Acid (VITAMIN C PO) Take  by mouth Daily.      Cholecalciferol (VITAMIN D3 PO) Take 1,000 Units by mouth 2 (Two) Times a Day. Not in summer months      coenzyme Q10 100 MG capsule Take 1 capsule by mouth Daily.      diclofenac (VOLTAREN) 50 MG EC tablet TAKE ONE TABLET BY MOUTH TWICE A DAY AS NEEDED FOR PAIN 60 tablet 5    estradiol (Vagifem) 10 MCG tablet vaginal tablet Insert 1 tablet into the vagina Daily. 30 tablet 11    fexofenadine (ALLEGRA) 180 MG tablet Take 1 tablet by mouth Daily As Needed (allergies).      Lactobacillus (ACIDOPHILUS PO) Take  by mouth Daily.      magnesium, as, gluconate (MAGONATE) 500 (27 Mg) MG tablet Take 1 tablet by mouth Daily. 90 tablet 1    meclizine (ANTIVERT) 12.5 MG tablet As Needed.      Menaquinone-7 (Vitamin K2) 100 MCG capsule Take 1 capsule by mouth Daily.      minoxidil (LONITEN) 10 MG tablet Take 0.5 tablets by mouth Daily. 45 tablet 1    multivitamin with minerals tablet tablet Take 1 tablet by mouth Daily.      niacin 500 MG tablet Take 3 tablets by mouth Daily.      Phendimetrazine Tartrate 35 MG tablet Take 1 tablet by mouth 3 (Three) Times a Day.      Progesterone (Prometrium) 100 MG capsule Take 1 capsule by mouth Daily. 30 capsule 11    traZODone (DESYREL) 50 MG tablet TAKE ONE TABLET BY MOUTH ONCE NIGHTLY 30 tablet 5    [DISCONTINUED] minoxidil (LONITEN) 10  "MG tablet TAKE 1/2 TABLET BY MOUTH DAILY 45 tablet 1    [DISCONTINUED] Phendimetrazine Tartrate 35 MG tablet TAKE 1 TABLET BY MOUTH 3 TIMES A DAY 90 each 0    [DISCONTINUED] Phendimetrazine Tartrate 35 MG tablet Take 1 tablet by mouth 3 (Three) Times a Day. 90 each 2    rosuvastatin (CRESTOR) 5 MG tablet Take 1 tablet by mouth Every Night. 90 tablet 1    [DISCONTINUED] clindamycin (CLEOCIN T) 1 % external solution       [DISCONTINUED] estradiol (Vagifem) 10 MCG tablet vaginal tablet Insert 1 tablet into the vagina 2 (Two) Times a Week for 336 days. 8 tablet 11    [DISCONTINUED] estradiol (VAGIFEM) 10 MCG tablet vaginal tablet Insert daily for 2 weeks followed by 1 insert twice weekly and repeat monthly. 18 tablet 11    [DISCONTINUED] Sodium Hyaluronate, oral, (HYALURONIC ACID PO) Take  by mouth Daily.       No facility-administered encounter medications on file as of 12/10/2024.       Age appropriate preventive counseling done including age appropriate vaccines,regular  Mammogram and self breast exam, pap smear, colonoscopy, regular dental visits, mental health, injury prevention such as wearing seat belt and preventing falls, healthy  nutrition, healthy weight, regular physical exercise. Alcohol use is moderate.  Tobacco history-none. Drug use-none.  STD's-not at risk.         Objective   Vital Signs:  /80 (BP Location: Left arm, Patient Position: Sitting, Cuff Size: Adult)   Pulse 76   Temp 98.2 °F (36.8 °C) (Infrared)   Ht 167.6 cm (65.98\")   Wt 84.1 kg (185 lb 6.4 oz)   SpO2 99%   BMI 29.94 kg/m²     [unfilled]    The following data was reviewed by: Carina Chan MD on 12/10/2024:  CMP          12/6/2024    11:43   CMP   Glucose 98    BUN 17    Creatinine 0.79    EGFR 89.0    Sodium 141    Potassium 4.6    Chloride 104    Calcium 9.7    Total Protein 7.6    Albumin 4.3    Globulin 3.3    Total Bilirubin 0.5    Alkaline Phosphatase 80    AST (SGOT) 19    ALT (SGPT) 14    Albumin/Globulin Ratio " 1.3    BUN/Creatinine Ratio 21.5    Anion Gap 9.1      CBC          12/6/2024    11:43   CBC   WBC 5.01    RBC 4.27    Hemoglobin 13.7    Hematocrit 39.1    MCV 91.6    MCH 32.1    MCHC 35.0    RDW 12.2    Platelets 286      CBC w/diff          12/6/2024    11:43   CBC w/Diff   WBC 5.01    RBC 4.27    Hemoglobin 13.7    Hematocrit 39.1    MCV 91.6    MCH 32.1    MCHC 35.0    RDW 12.2    Platelets 286    Neutrophil Rel % 55.5    Immature Granulocyte Rel % 0.4    Lymphocyte Rel % 32.5    Monocyte Rel % 9.8    Eosinophil Rel % 1.4    Basophil Rel % 0.4      Lipid Panel          12/6/2024    11:43   Lipid Panel   Total Cholesterol 238    Triglycerides 147    HDL Cholesterol 68    VLDL Cholesterol 26    LDL Cholesterol  144    LDL/HDL Ratio 2.07      TSH          12/6/2024    11:43   TSH   TSH 1.050      A1C Last 3 Results          12/6/2024    11:43   HGBA1C Last 3 Results   Hemoglobin A1C 5.20           Assessment and Plan   Diagnoses and all orders for this visit:    1. Annual physical exam (Primary)    2. Mixed hyperlipidemia  -     Comprehensive Metabolic Panel; Future  -     Lipid Panel; Future  -     ECG 12 Lead    3. Elevated blood pressure reading without diagnosis of hypertension    4. Overweight with body mass index (BMI) of 29 to 29.9 in adult  -     Phendimetrazine Tartrate 35 MG tablet; Take 1 tablet by mouth 3 (Three) Times a Day.    5. Primary insomnia    6. Superior glenoid labrum lesion of left shoulder, subsequent encounter    7. Arthritis of carpometacarpal (CMC) joint of thumb    8. Basal cell carcinoma (BCC) of skin of nose    9. Malignant melanoma of face excluding eyelid, nose, lip, and ear    10. Hair loss    11. Status post left rotator cuff repair    12. Abnormal glucose  -     Hemoglobin A1c; Future    13. Vitamin D deficiency  -     Vitamin D,25-Hydroxy; Future    14. Hypercholesterolemia  -     CBC & Differential; Future  -     Comprehensive Metabolic Panel; Future  -     Lipid Panel;  Future  -     Microalbumin / Creatinine Urine Ratio - Urine, Clean Catch; Future  -     Urinalysis With Microscopic - Urine, Clean Catch; Future  -     TSH; Future  -     Vitamin B12; Future    Other orders  -     minoxidil (LONITEN) 10 MG tablet; Take 0.5 tablets by mouth Daily.  Dispense: 45 tablet; Refill: 1  -     Discontinue: Phendimetrazine Tartrate 35 MG tablet; Take 1 tablet by mouth 3 (Three) Times a Day.  Dispense: 90 each; Refill: 2  -     rosuvastatin (CRESTOR) 5 MG tablet; Take 1 tablet by mouth Every Night.  Dispense: 90 tablet; Refill: 1  -     Fluzone >6mos      Assessment & Plan  1. Mixed hyperlipidemia.  She will start low-dose rosuvastatin 5 mg every evening. She may take 400 mg of CoQ10 with it if she wishes, which can help prevent side effects from statins. She will continue to improve her low-fat, healthy diet and increase regular aerobic exercise. A fasting lipid panel will be done in 2 months.    2. Overweight with a BMI of 29.  She will continue to improve her low-fat, healthy diet, eat small portions at mealtime, avoid snacking, and try to eat dinner earlier in the evenings. She will continue to increase aerobic exercise and weigh at home once a week to monitor progress. It is reasonable to aim for a weight loss of about 4 pounds per month.    3. Elevated blood pressure.  She had a couple of days where her systolic blood pressure was about 160, possibly due to increased consumption of  food. She generally follows a very low-salt diet. Since then, her blood pressures have been in the normal range. Her blood pressure here today is 110/80 and on 11/05/2024, it was 118/76. She will continue her low-salt diet and monitor her blood pressure every couple of weeks.    4. Primary insomnia.  She may take trazodone every night or as needed, but should always avoid taking it after midnight. She could use 1/2 tablet if desired. She will continue to practice good sleep hygiene.    5. Superior  glenoid labrum lesion of left shoulder.  She is status post rotator cuff repair and doing well. She will continue to do some of the exercises from PT on a regular basis.    6. Arthritis of CMC joints bilaterally.  She will follow up with the orthopedic surgeon and likely have surgery soon. She may continue diclofenac tablets with food as needed and may also take Tylenol Arthritis with it if needed.    7. Basal cell carcinoma of the nose.  She will have a resection in 01/2025.    8. History of melanoma.  She will continue regular follow-up with the dermatologist.    9. Hair loss.  She will continue minoxidil daily.    10. Abnormal glucose.  Her A1c looks good. She will continue to avoid sugars and starchy foods in her diet. She will go by any [facility] lab any day in 2 months to recheck fasting lipids.    Follow-up  The patient will follow up in 6 months.    PROCEDURE  The patient underwent left rotator cuff surgery in 05/2024 or 06/2024.          Follow Up   No follow-ups on file.  Patient was given instructions and counseling regarding her condition or for health maintenance advice. Please see specific information pulled into the AVS if appropriate.     Note: Part of this note may be an electronic transcription/translation of spoken language to printed text using the Dragon Dictation System.     Carina Chan MD  Patient or patient representative verbalized consent for the use of Ambient Listening during the visit with  Carina Chan MD for chart documentation. 12/11/2024  12:51 EST

## 2024-12-11 NOTE — PATIENT INSTRUCTIONS
Patient Instructions  Problem List Items Addressed This Visit          Cardiac and Vasculature    Mixed hyperlipidemia (Chronic)    Relevant Medications    rosuvastatin (CRESTOR) 5 MG tablet    Other Relevant Orders    Comprehensive Metabolic Panel    Lipid Panel    ECG 12 Lead    Elevated blood pressure reading without diagnosis of hypertension (Chronic)       Endocrine and Metabolic    Abnormal glucose    Relevant Orders    Hemoglobin A1c (Completed)       Hematology and Neoplasia    Malignant melanoma of face excluding eyelid, nose, lip, and ear (Chronic)    Overview     R cheek excision 2018.         Basal cell carcinoma (BCC) of skin of nose (Chronic)    Overview     N[facility]. [facility].            Musculoskeletal and Injuries    Superior glenoid labrum lesion of left shoulder    Status post left rotator cuff repair    Arthritis of carpometacarpal (CMC) joint of thumb       Skin    Hair loss (Chronic)       Sleep    Primary insomnia    Relevant Medications    traZODone (DESYREL) 50 MG tablet       Other    Overweight with body mass index (BMI) of 29 to 29.9 in adult (Chronic)    Relevant Medications    Phendimetrazine Tartrate 35 MG tablet     Other Visit Diagnoses       Annual physical exam    -  Primary    Vitamin D deficiency        Relevant Orders    Vitamin D,25-Hydroxy (Completed)            Exercising to Stay Healthy  To become healthy and stay healthy, it is recommended that you do moderate-intensity and vigorous-intensity exercise. You can tell that you are exercising at a moderate intensity if your heart starts beating faster and you start breathing faster but can still hold a conversation. You can tell that you are exercising at a vigorous intensity if you are breathing much harder and faster and cannot hold a conversation while exercising.  How can exercise benefit me?  Exercising regularly is important. It has many health benefits, such as:  Improving overall fitness, flexibility,  and endurance.  Increasing bone density.  Helping with weight control.  Decreasing body fat.  Increasing muscle strength and endurance.  Reducing stress and tension, anxiety, depression, or anger.  Improving overall health.  What guidelines should I follow while exercising?  Before you start a new exercise program, talk with your health care provider.  Do not exercise so much that you hurt yourself, feel dizzy, or get very short of breath.  Wear comfortable clothes and wear shoes with good support.  Drink plenty of water while you exercise to prevent dehydration or heat stroke.  Work out until your breathing and your heartbeat get faster (moderate intensity).  How often should I exercise?  Choose an activity that you enjoy, and set realistic goals. Your health care provider can help you make an activity plan that is individually designed and works best for you.  Exercise regularly as told by your health care provider. This may include:  Doing strength training two times a week, such as:  Lifting weights.  Using resistance bands.  Push-ups.  Sit-ups.  Yoga.  Doing a certain intensity of exercise for a given amount of time. Choose from these options:  A total of 150 minutes of moderate-intensity exercise every week.  A total of 75 minutes of vigorous-intensity exercise every week.  A mix of moderate-intensity and vigorous-intensity exercise every week.  Children, pregnant women, people who have not exercised regularly, people who are overweight, and older adults may need to talk with a health care provider about what activities are safe to perform. If you have a medical condition, be sure to talk with your health care provider before you start a new exercise program.  What are some exercise ideas?  Moderate-intensity exercise ideas include:  Walking 1 mile (1.6 km) in about 15 minutes.  Biking.  Hiking.  Golfing.  Dancing.  Water aerobics.  Vigorous-intensity exercise ideas include:  Walking 4.5 miles (7.2 km) or more  in about 1 hour.  Jogging or running 5 miles (8 km) in about 1 hour.  Biking 10 miles (16.1 km) or more in about 1 hour.  Lap swimming.  Roller-skating or in-line skating.  Cross-country skiing.  Vigorous competitive sports, such as football, basketball, and soccer.  Jumping rope.  Aerobic dancing.  What are some everyday activities that can help me get exercise?  Yard work, such as:  Pushing a .  Raking and bagging leaves.  Washing your car.  Pushing a stroller.  Shoveling snow.  Gardening.  Washing windows or floors.  How can I be more active in my day-to-day activities?  Use stairs instead of an elevator.  Take a walk during your lunch break.  If you drive, park your car farther away from your work or school.  If you take public transportation, get off one stop early and walk the rest of the way.  Stand up or walk around during all of your indoor phone calls.  Get up, stretch, and walk around every 30 minutes throughout the day.  Enjoy exercise with a friend. Support to continue exercising will help you keep a regular routine of activity.  Where to find more information  You can find more information about exercising to stay healthy from:  [place] Department of Health and Human Services: www.hhs.gov  Centers for Disease Control and Prevention (CDC): www.cdc.gov  Summary  Exercising regularly is important. It will improve your overall fitness, flexibility, and endurance.  Regular exercise will also improve your overall health. It can help you control your weight, reduce stress, and improve your bone density.  Do not exercise so much that you hurt yourself, feel dizzy, or get very short of breath.  Before you start a new exercise program, talk with your health care provider.  This information is not intended to replace advice given to you by your health care provider. Make sure you discuss any questions you have with your health care provider.  Document Revised: 04/15/2022 Document Reviewed:  "04/15/2022  [employer] Patient Education © 2023 [employer] BMI for Adults  Body mass index (BMI) is a number found using a person's weight and height. BMI can help tell how much of a person's weight is made up of fat. BMI does not measure body fat directly. It is used instead of tests that directly measure body fat, which can be difficult and expensive.  What are BMI measurements used for?  BMI is useful to:  Find out if your weight puts you at higher risk for medical problems.  Help recommend changes, such as in diet and exercise. This can help you reach a healthy weight. BMI screening can be done again to see if these changes are working.  How is BMI calculated?  Your height and weight are measured. The BMI is found from those numbers. This can be done with [place] or metric measurements. Note that charts and online BMI calculators are available to help you find your BMI quickly and easily without doing these calculations.  To calculate your BMI in [place] measurements:  Measure your weight in pounds (lb).  Multiply the number of pounds by 703.  So, for an adult who weighs 150 lb, multiply that number by 703: 150 x 703, which equals 105,450.  Measure your height in inches. Then multiply that number by itself to get a measurement called \"inches squared.\"  So, for an adult who is 70 inches tall, the \"inches squared\" measurement is 70 inches x 70 inches, which equals 4,900 inches squared.  Divide the total from step 2 (number of lb x 703) by the total from step 3 (inches squared): 105,450 ÷ 4,900 = 21.5. This is your BMI.  To calculate your BMI in metric measurements:    Measure your weight in kilograms (kg).  For this example, the weight is 70 kg.  Measure your height in meters (m). Then multiply that number by itself to get a measurement called \"meters squared.\"  So, for an adult who is 1.75 m tall, the \"meters squared\" measurement is 1.75 m x 1.75 m, which equals 3.1 meters squared.  Divide the number of kilograms " (your weight) by the meters squared number. In this example: 70 ÷ 3.1 = 22.6. This is your BMI.  What do the results mean?  BMI charts are used to see if you are underweight, normal weight, overweight, or obese. The following guidelines will be used:  Underweight: BMI less than 18.5.  Normal weight: BMI between 18.5 and 24.9.  Overweight: BMI between 25 and 29.9.  Obese: BMI of 30 or above.  BMI is a tool and cannot diagnose a condition. Talk with your health care provider about what your BMI means for you. Keep these notes in mind:  Weight includes fat and muscle. Someone with a muscular build, such as an athlete, may have a BMI that is higher than 24.9. In cases like these, BMI is not a correct measure of body fat.  If you have a BMI of 25 or higher, your provider may need to do more testing to find out if excess body fat is the cause.  BMI is measured the same way for males and females. Females usually have more body fat than males of the same height and weight.  Where to find more information  For more information about BMI, including tools to quickly find your BMI, go to:  Centers for Disease Control and Prevention: cdc.gov  American Heart Association: heart.org  National Heart, Lung, and Blood [place]: nhlbi.nih.gov  This information is not intended to replace advice given to you by your health care provider. Make sure you discuss any questions you have with your health care provider.  Document Revised: 09/07/2023 Document Reviewed: 08/31/2023  [employer] Patient Education © 2024 [employer]  Heart-Healthy Eating Plan  Many factors influence your heart (coronary) health, including eating and exercise habits. Coronary risk increases with abnormal blood fat (lipid) levels. Heart-healthy meal planning includes limiting unhealthy fats, increasing healthy fats, and making other diet and lifestyle changes.  What is my plan?  Your health care provider may recommend that you:  Limit your fat intake to _________% or  less of your total calories each day.  Limit your saturated fat intake to _________% or less of your total calories each day.  Limit the amount of cholesterol in your diet to less than _________ mg per day.  What are tips for following this plan?  Cooking  Cook foods using methods other than frying. Baking, boiling, grilling, and broiling are all good options. Other ways to reduce fat include:  Removing the skin from poultry.  Removing all visible fats from meats.  Steaming vegetables in water or broth.  Meal planning  A plate with examples of foods in a healthy diet.      At meals, imagine dividing your plate into fourths:  Fill one-half of your plate with vegetables and green salads.  Fill one-fourth of your plate with whole grains.  Fill one-fourth of your plate with lean protein foods.  Eat 4-5 servings of vegetables per day. One serving equals 1 cup raw or cooked vegetable, or 2 cups raw leafy greens.  Eat 4-5 servings of fruit per day. One serving equals 1 medium whole fruit, ¼ cup dried fruit, ½ cup fresh, frozen, or canned fruit, or ½ cup 100% fruit juice.  Eat more foods that contain soluble fiber. Examples include apples, broccoli, carrots, beans, peas, and barley. Aim to get 25-30 g of fiber per day.  Increase your consumption of legumes, nuts, and seeds to 4-5 servings per week. One serving of dried beans or legumes equals ½ cup cooked, 1 serving of nuts is ¼ cup, and 1 serving of seeds equals 1 tablespoon.  Fats  Choose healthy fats more often. Choose monounsaturated and polyunsaturated fats, such as olive and canola oils, flaxseeds, walnuts, almonds, and seeds.  Eat more omega-3 fats. Choose salmon, mackerel, sardines, tuna, flaxseed oil, and ground flaxseeds. Aim to eat fish at least 2 times each week.  Check food labels carefully to identify foods with trans fats or high amounts of saturated fat.  Limit saturated fats. These are found in animal products, such as meats, butter, and cream. Plant  sources of saturated fats include palm oil, palm kernel oil, and coconut oil.  Avoid foods with partially hydrogenated oils in them. These contain trans fats. Examples are stick margarine, some tub margarines, cookies, crackers, and other baked goods.  Avoid fried foods.  General information  Eat more home-cooked food and less restaurant, buffet, and fast food.  Limit or avoid alcohol.  Limit foods that are high in starch and sugar.  Lose weight if you are overweight. Losing just 5-10% of your body weight can help your overall health and prevent diseases such as diabetes and heart disease.  Monitor your salt (sodium) intake, especially if you have high blood pressure. Talk with your health care provider about your sodium intake.  Try to incorporate more vegetarian meals weekly.  What foods can I eat?  Fruits  All fresh, canned (in natural juice), or frozen fruits.  Vegetables  Fresh or frozen vegetables (raw, steamed, roasted, or grilled). Green salads.  Grains  Most grains. Choose whole wheat and whole grains most of the time. Rice and pasta, including brown rice and pastas made with whole wheat.  Meats and other proteins  Lean, well-trimmed beef, veal, pork, and lamb. Chicken and turkey without skin. All fish and shellfish. Wild duck, rabbit, pheasant, and venison. Egg whites or low-cholesterol egg substitutes. Dried beans, peas, lentils, and tofu. Seeds and most nuts.  Dairy  Low-fat or nonfat cheeses, including ricotta and mozzarella. Skim or 1% milk (liquid, powdered, or evaporated). Buttermilk made with low-fat milk. Nonfat or low-fat yogurt.  Fats and oils  Non-hydrogenated (trans-free) margarines. Vegetable oils, including soybean, sesame, sunflower, olive, peanut, safflower, corn, canola, and cottonseed. Salad dressings or mayonnaise made with a vegetable oil.  Beverages  Water (mineral or sparkling). Coffee and tea. Diet carbonated beverages.  Sweets and desserts  Sherbet, gelatin, and fruit ice. Small  amounts of dark chocolate.  Limit all sweets and desserts.  Seasonings and condiments  All seasonings and condiments.  The items listed above may not be a complete list of foods and beverages you can eat. Contact a dietitian for more options.  What foods are not recommended?  Fruits  Canned fruit in heavy syrup. Fruit in cream or butter sauce. Fried fruit. Limit coconut.  Vegetables  Vegetables cooked in cheese, cream, or butter sauce. Fried vegetables.  Grains  Breads made with saturated or trans fats, oils, or whole milk. Croissants. Sweet rolls. Donuts. High-fat crackers, such as cheese crackers.  Meats and other proteins  Fatty meats, such as hot dogs, ribs, sausage, izquierdo, rib-eye roast or steak. High-fat deli meats, such as salami and bologna. Caviar. Domestic duck and goose. Organ meats, such as liver.  Dairy  Cream, sour cream, cream cheese, and creamed cottage cheese. Whole-milk cheeses. Whole or 2% milk (liquid, evaporated, or condensed). Whole buttermilk. Cream sauce or high-fat cheese sauce. Whole-milk yogurt.  Fats and oils  Meat fat, or shortening. Cocoa butter, hydrogenated oils, palm oil, coconut oil, palm kernel oil. Solid fats and shortenings, including izquierdo fat, salt pork, lard, and butter. Nondairy cream substitutes. Salad dressings with cheese or sour cream.  Beverages  Regular sodas and any drinks with added sugar.  Sweets and desserts  Frosting. Pudding. Cookies. Cakes. Pies. Milk chocolate or white chocolate. Buttered syrups. Full-fat ice cream or ice cream drinks.  The items listed above may not be a complete list of foods and beverages to avoid. Contact a dietitian for more information.  Summary  Heart-healthy meal planning includes limiting unhealthy fats, increasing healthy fats, and making other diet and lifestyle changes.  Lose weight if you are overweight. Losing just 5-10% of your body weight can help your overall health and prevent diseases such as diabetes and heart disease.  Focus  on eating a balance of foods, including fruits and vegetables, low-fat or nonfat dairy, lean protein, nuts and legumes, whole grains, and heart-healthy oils and fats.  This information is not intended to replace advice given to you by your health care provider. Make sure you discuss any questions you have with your health care provider.  Document Revised: 04/28/2022 Document Reviewed: 04/28/2022  [employer] Patient Education © 2022 [employer]

## 2024-12-14 DIAGNOSIS — F51.01 PRIMARY INSOMNIA: ICD-10-CM

## 2024-12-16 RX ORDER — TRAZODONE HYDROCHLORIDE 50 MG/1
50 TABLET, FILM COATED ORAL NIGHTLY
Qty: 90 TABLET | Refills: 0 | Status: SHIPPED | OUTPATIENT
Start: 2024-12-16

## 2025-01-07 ENCOUNTER — OFFICE VISIT (OUTPATIENT)
Age: 55
End: 2025-01-07
Payer: COMMERCIAL

## 2025-01-07 VITALS
DIASTOLIC BLOOD PRESSURE: 82 MMHG | BODY MASS INDEX: 30.49 KG/M2 | SYSTOLIC BLOOD PRESSURE: 120 MMHG | WEIGHT: 189.7 LBS | HEIGHT: 66 IN

## 2025-01-07 DIAGNOSIS — M18.10 ARTHRITIS OF CARPOMETACARPAL (CMC) JOINT OF THUMB: Primary | ICD-10-CM

## 2025-01-07 DIAGNOSIS — M25.341: ICD-10-CM

## 2025-01-07 NOTE — PROGRESS NOTES
[facility]     Office Visit       Date: 01/07/2025   Patient Name: Laine Hernandez  MRN: [id number]  YOB: 1970    Referring Physician: Carina Chan MD     Chief Complaint:   Chief Complaint   Patient presents with    Follow-up     2 month follow up -- Arthritis of carpometacarpal (CMC) joint of thumb     History of Present Illness:   Laine Hernandez is a 54 y.o. female left-hand-dominant presenting to clinic for follow-up of basilar thumb arthritis.  Scheduled for left thumb CMC arthroplasty.  She called the office wishing to reschedule and possibly discuss surgery on her right hand instead.  She reports that her symptoms are largely unchanged from her prior visit.  She continues to endorse bilateral thumb pain.  Pain today is 6/10.  In the past she has attempted bracing and injections.  She wishes to proceed with surgical treatment on the right side, as she is left-hand-dominant and to avoid surgery on her dominant hand at this time.    Subjective   Review of Systems:   Review of Systems   Constitutional:  Negative for chills, fever, unexpected weight gain and unexpected weight loss.   HENT:  Negative for congestion, postnasal drip and rhinorrhea.    Eyes:  Negative for blurred vision.   Respiratory:  Negative for shortness of breath.    Cardiovascular:  Negative for leg swelling.   Gastrointestinal:  Negative for abdominal pain, nausea and vomiting.   Genitourinary:  Negative for difficulty urinating.   Musculoskeletal:  Positive for arthralgias. Negative for gait problem, joint swelling and myalgias.   Skin:  Negative for skin lesions and wound.   Neurological:  Negative for dizziness, weakness, light-headedness and numbness.   Hematological:  Does not bruise/bleed easily.   Psychiatric/Behavioral:  Negative for depressed mood.       Pertinent review of systems per HPI    I reviewed the patient's chief  "complaint, history of present illness, review of systems, past medical history, surgical history, family history, social history, medications and allergy list in the EMR on 01/07/2025 and agree with the findings above.    Objective    Vital Signs:   Vitals:    01/07/25 1104   BP: 120/82   Weight: 86 kg (189 lb 11.2 oz)   Height: 167.6 cm (65.98\")     General: No acute distress. Alert and oriented.   Cardiovascular: Palpable radial pulse.   Respiratory: Breathing is nonlabored.   Ortho Exam:  Examination of the bilateral upper extremity:  No skin lesions or ecchymosis. No edema.  There is a Z deformity noted to the right hand.  No thenar, hypothenar, or interosseous wasting appreciated           Tender to palpation over the thumb CMC joints worse volarly.           Nontender to palpation over the scaphoid  Full and painless active flexion/extension/pronosupination of the wrist  Able to make a composite fist  positive Thumb CMC joint grind   positive Thumb MCP joint hyper extension, 25 to 30 degrees on the left and 45 degrees on the right.  Unchanged from prior visit.  Firm endpoint with radial/ulnar deviation of the thumb MCP at 0 and 30 degrees of flexion  negative Finkelstein's test  Median/radial/ulnar sensory intact to light touch  Digits are warm and well-perfused    Imaging / Studies:    Imaging Results (Last 24 Hours)       ** No results found for the last 24 hours. **          Assessment / Plan    Assessment/Plan:   Laine Hernandez is a 54 y.o. female with bilateral thumb CMC arthritis with right thumb MCP joint hyperextension.    The patient continues to have symptomatic bilateral thumb CMC joint arthritis with MCP joint hyperextension on the right.  We again discussed the surgical and nonsurgical options.  Operative treatments to the right thumb would include CMC arthroplasty with MCP joint fusion.  Fusion would illuminate motion at the thumb MCP joint positioning her thumb in a position of function and " stability.  She understands that fusion will limit motion.  I explained to her the risk, benefits, alternatives and prognosis, and she elects to proceed with right thumb CMC arthroplasty and MCP joint fusion.    The risks and benefits of the procedure were discussed with the patient and/or appropriate guardian, which include but are not limited to: the risk of bleeding, pain, infection, wound complications, neurovascular damage, post-operative stiffness, tendon and/or ligament injury, persistent pain, need for additional surgeries in the future, and general risks from anesthesia. CMC arthroplasty risks: Specific risks include: persistent thumb weakness, incisional numbness, persistent pain, damage to branches of the radial artery, damage to nearby tendons.  MCP joint fusion risk include failure of fusion/nonunion, hardware irritation, need for additional procedures.  We also discussed the post-operative rehabilitation, length of immobilization, the need for therapy, and the overall expected outcomes from the procedure. Proper time was given to answer the patient's questions regarding the procedure. The patient expressed understanding. Knowing what the risks are and what the conservative treatment is, the patient elected to forgo any further conservative treatment options and proceed with the surgical intervention. Surgical consent was obtained in the clinic and signed by myself and the patient. She will follow up with me post operatively.       ICD-10-CM ICD-9-CM   1. Arthritis of carpometacarpal (CMC) joint of thumb  M18.10 716.94   2. Chronic instability of first MCP joint, right  M25.341 718.84     Follow Up:   Return for Follow Up- After surgery.      Dasia Martínez MD  [facility] [place] [facility]

## 2025-02-12 ENCOUNTER — OUTSIDE FACILITY SERVICE (OUTPATIENT)
Age: 55
End: 2025-02-12
Payer: COMMERCIAL

## 2025-02-12 ENCOUNTER — DOCUMENTATION (OUTPATIENT)
Dept: ORTHOPEDIC SURGERY | Facility: CLINIC | Age: 55
End: 2025-02-12
Payer: COMMERCIAL

## 2025-02-12 DIAGNOSIS — M18.10 ARTHRITIS OF CARPOMETACARPAL (CMC) JOINT OF THUMB: Primary | ICD-10-CM

## 2025-02-12 PROCEDURE — 73130 X-RAY EXAM OF HAND: CPT | Performed by: STUDENT IN AN ORGANIZED HEALTH CARE EDUCATION/TRAINING PROGRAM

## 2025-02-12 PROCEDURE — 25448 ARTHRP NTRCRPL/CRP/MTCRP SSP: CPT | Performed by: STUDENT IN AN ORGANIZED HEALTH CARE EDUCATION/TRAINING PROGRAM

## 2025-02-12 PROCEDURE — 26850 FUSION OF KNUCKLE: CPT | Performed by: STUDENT IN AN ORGANIZED HEALTH CARE EDUCATION/TRAINING PROGRAM

## 2025-02-12 RX ORDER — OXYCODONE HYDROCHLORIDE 5 MG/1
5 TABLET ORAL EVERY 8 HOURS PRN
Qty: 30 TABLET | Refills: 0 | Status: SHIPPED | OUTPATIENT
Start: 2025-02-12

## 2025-02-12 NOTE — PROGRESS NOTES
ORTHOPEDIC OPERATIVE REPORT    PATIENT NAME: Laine Hernandez     MRN: [id number]    DATE OF SURGERY: 02/12/25    LOCATION: [facility]    PREOPERATIVE DIAGNOSIS:   Right first carpometacarpal joint arthritis with MCP joint hyperextension    POSTOPERATIVE DIAGNOSIS:  Right first carpometacarpal joint arthritis with MCP joint hyperextension    PROCEDURE PERFORMED:  Right first carpometacarpal joint interposition arthroplasty with APL to FCR suspensionplasty  Right thumb MCP joint arthrodesis     CPT CODE:  59778   [id number]    SURGEON: Dasia Martínez MD     ASSISTANT: None     ANESTHESIA: Monitored anesthesia with regional block    SPECIMENS: None    TOURNIQUET TIME: 65 minutes    ESTIMATED BLOOD LOSS: Minimal     COMPLICATIONS: None    IMPLANTS: Acutrek 4.0mm x4 0mm headless compression screw    INDICATIONS FOR PROCEDURE:  Laine Hernandez is a 54-year-old left-hand-dominant female who presented to clinic with complaints of bilateral thumb pain.  Clinical and radiographic examination were consistent with that of bilateral basal joint arthritis with right MCP joint hyperextension. The patient was offered conservative treatment including activity modification, occupational therapy, bracing, and corticosteroid injections. The patient failed to demonstrate sustained improvements in pain and function and was offered CMC arthroplasty in the form of trapeziectomy and APL to FCR suspensionplasty with right thumb MCP joint fusion.  After explaining the risks, benefits, and alternatives they elected to proceed with right thumb CMC arthroplasty with thumb MCP joint fusion.     DESCRIPTION OF PROCEDURE:   The patient was identified in the preoperative holding area utilizing two patient identifiers. The operative extremity was marked. A regional block was performed per the anesthesia team.  They were taken back to the operating room and placed supine on the operative table.  A forearm tourniquet was placed and the operative  extremity was prepped and draped in a standard sterile fashion.  Monitored anesthesia was performed by the anesthesia team, and a surgical time out was performed that confirmed the correct site, procedure and laterality. There was confirmation that preoperative antibiotics were given.     Utilizing a volar approach, a curvilinear incision was drawn out approximately 5 cm in length overlying the first CMC joint, between the glabrous and nonglabrous skin. An esmarch was used to exsanguinate the limb, and the tourniquet was inflated to 250 mmHg. A 15 blade was used to incise through the skin and subcutaneous tissue. Littler scissors were used to bluntly dissect down to the level of the thenar musculature.  A small self retaining retractor was placed and the interval between the thenar musculature and the APL was readily identified.  A 15 blade was used to develop this plane starting distally and working proximally, curving towards the FCR.  The FCR tunnel was identified in the most ulnar portion of the surgical incision.  With the FCR tendon visible deep, the tunnel was sharply incised distally.  The thenar musculature was then completely reflected off the trapezium in a radial to ulnar direction with care taken to avoid damage to the FCR ulnarly.    A fresh 15 blade was used to circumferentially dissect and release the soft tissue attachments surrounding the trapezium. A small bryson retractor and freer elevator were used to create tension on the surrounding soft tissues to aid in dissection. Care was taken at the most radial aspect of the incision to avoid injury to the radial artery located subcutaneously. Once the trapezium was released of all soft tissue attachments, a small curette was placed in the CMC joint followed by the ST joint, and used as a lever to aid in freeing the trapezium.  A large rongeur was placed around the trapezium and it was removed en bloc.     The wound was inspected and remaining  osteophytes were removed with a rongeur.  The space between the first and second metacarpal was inspected, and osteophytes were removed.  The base of the first metacarpal was also inspected and osteophytes noted radially were also removed with a rongeur.  With traction pulled on the index finger, a [place] was inserted into the ST joint which was inspected.  There was no evidence of arthritis at the articulation between the scaphoid and trapezoid.     I then turned my attention to the thumb MCP joint fusion.  A longitudinal incision was marked dorsally overlying the MCP joint. A 15 blade was used to incise through the skin and subcutaneous tissue. Littler scissors were used to bluntly dissect down to the level of the extensor tendons. The interval between the EPL and EPB was developed. The joint capsule was encountered and incised in line with the skin incision. Full thickness flaps were created for later repair. The collateral ligaments along the radial and ulnar aspects were released from the metacarpal head. This exposed the metacarpal head and the proximal phalanx base articular surfaces. There was articular wear noted along the dorsal aspect of the thumb metacarpal head. Using a rongeur and a curette, the remainder of the articular cartilage was removed and the joint denuded. Care was taken to denude volarly and contour the metacarpal head and phalanx base. A k wire was advanced retrograde from the metacarpal head starting volarly and aiming dorsally and proximally. Once the wire was confirmed in an appropriate position on xrays, this was advanced outside the skin until the k wire was flush along the metacarpal head. The MCP joint was then held in 20 degrees of flexion and the wire was advanced antegrade to the subchondral bone of the proximal phalanx. Position was confirmed on xrays and a 15 blade was used to create a skin incision overlying the exposed k wire. Blunt dissection with a hemostat was performed to  avoid injury to the dorsal radial sensory branches.  A size 36 mm screw was placed over the MCP joint and measured on x-ray and found to be of appropriate size.  The k wire was advanced prior to drilling. The metacarpal dorsal cortex was drilled and subsequently the appropriately sized screw was placed. Compression was confirmed at the fusion site. Bone graft from the trapezium was placed at the fusion site. Final xrays demonstrated appropriate hardware placement and fusion position.    I then turned my attention back to the volar incision which was irrigated with normal saline, removing any remaining bony debris.  An x-ray was obtained confirming complete trapeziectomy and removal of all bony debris.  Utilizing a 2-0 FiberWire, a suture was placed in a horizontal fashion between the most ulnar slip of the APL and the FCR completing our suspensionplasty.  Surgifoam was cut, rolled and placed in the space left by the trapeziectomy.   Stress radiographs of the thumb metacarpal were performed demonstrating maintained metacarpal height without significant subsidence of the thumb metacarpal.    The tourniquet was deflated and hemostasis was achieved with bipolar electrocautery. The wounds were irrigated with normal saline.  The thumb MCP joint capsule was closed with 3-0 Vicryl and the extensor split was closed with 5-0 Prolene.  The skin was closed with 4-0 Monocryl in an interrupted and subcuticular fashion.  Steri-Strips, 4x4's, and webroll were placed.  A well padded thumb spica splint was applied.  Patient was awoken from anesthesia and transferred the PACU in good and stable condition.  All counts were correct at the end of the case.     POSTOPERATIVE PLAN:  Weightbearing: Non weightbearing to the right upper extremity.   2.  A narcotic prescription for oxycodone 5 mg was provided.  Additionally, over the counter Tylenol and/or Advil/Aleve/Motrin for pain control.   3.  Splint: Do not remove. Encouraged to move the  digits as allowed by the splint.  4. Follow up in 10-14 days as scheduled.    Dasai Martínez MD  [facility] [place] [facility]

## 2025-02-13 ENCOUNTER — DOCUMENTATION (OUTPATIENT)
Dept: ORTHOPEDIC SURGERY | Facility: CLINIC | Age: 55
End: 2025-02-13
Payer: COMMERCIAL

## 2025-02-13 NOTE — PROGRESS NOTES
Right Hand X-Ray    Date: 02/13/25    Location: [facility]    Indication: Intraoperative imaging     Views:  PA and PA stress view, oblique, lateral    Comparison: 11/5/25    Findings:  Status post right trapeziectomy without residual osteophytes between the 1st and 2nd metacarpals.  Thumb metacarpal is stable to axial load without impingement onto the scaphoid.  Status post right thumb MCP joint fusion with appropriate fusion position and hardware placement.    Impression: Status post right trapeziectomy and thumb MCP joint fusion

## 2025-02-25 ENCOUNTER — OFFICE VISIT (OUTPATIENT)
Age: 55
End: 2025-02-25
Payer: COMMERCIAL

## 2025-02-25 ENCOUNTER — TREATMENT (OUTPATIENT)
Dept: PHYSICAL THERAPY | Facility: CLINIC | Age: 55
End: 2025-02-25
Payer: COMMERCIAL

## 2025-02-25 VITALS — TEMPERATURE: 98.4 F

## 2025-02-25 DIAGNOSIS — Z09 SURGERY FOLLOW-UP: ICD-10-CM

## 2025-02-25 DIAGNOSIS — M18.10 ARTHRITIS OF CARPOMETACARPAL (CMC) JOINT OF THUMB: Primary | ICD-10-CM

## 2025-02-25 DIAGNOSIS — Z47.89 ORTHOPEDIC AFTERCARE: ICD-10-CM

## 2025-02-25 DIAGNOSIS — M18.11 ARTHRITIS OF CARPOMETACARPAL (CMC) JOINT OF RIGHT THUMB: Primary | ICD-10-CM

## 2025-02-25 PROCEDURE — 99024 POSTOP FOLLOW-UP VISIT: CPT | Performed by: STUDENT IN AN ORGANIZED HEALTH CARE EDUCATION/TRAINING PROGRAM

## 2025-02-25 NOTE — PROGRESS NOTES
[facility]     Post Operative Office Visit      Date: 02/25/2025   Patient Name: Laine Hernandez  MRN: [id number]  YOB: 1970    Chief Complaint:   Chief Complaint   Patient presents with    Post-op     2 weeks status post Right first carpometacarpal joint interposition arthroplasty with APL to FCR suspensionplasty and Right thumb MCP joint arthrodesis - DOS 2/12/25       History of Present Illness:   Laine Hernandez is a 55 y.o. female status post right thumb CMC arthroplasty with MCP joint fusion, DOS 2/12/2025.  Has done well since surgery.  She states that her pain and swelling are improving with time.  Current pain is 3/10.  She is maintained her postoperative splint.  No other complaints or concerns.    Subjective   Review of Systems:   Review of Systems   Constitutional:  Negative for chills, fever, unexpected weight gain and unexpected weight loss.   HENT:  Negative for congestion, postnasal drip and rhinorrhea.    Eyes:  Negative for blurred vision.   Respiratory:  Negative for shortness of breath.    Cardiovascular:  Negative for leg swelling.   Gastrointestinal:  Negative for abdominal pain, nausea and vomiting.   Genitourinary:  Negative for difficulty urinating.   Musculoskeletal:  Positive for arthralgias. Negative for gait problem, joint swelling and myalgias.   Skin:  Negative for skin lesions and wound.   Neurological:  Negative for dizziness, weakness, light-headedness and numbness.   Hematological:  Does not bruise/bleed easily.   Psychiatric/Behavioral:  Negative for depressed mood.    All other systems reviewed and are negative.       I reviewed the patient's chief complaint, history of present illness, review of systems, past medical history, surgical history, family history, social history, medications and allergy list in the EMR on 02/25/2025 and agree with the findings above.    Objective     Vital Signs:   Vitals:    02/25/25 1147   Temp: 98.4 °F (36.9 °C)       General Appearance: No acute distress. Alert and oriented.     Ortho Exam:  Examination of the right Upper Extremity:   Skin examination: Healing surgical incisions overlying the dorsal aspect of the thumb MCP joint and volarly along the CMC joint.  Suture tails were trimmed today.  Mild swelling.  No erythema warmth or drainage.  Tender to palpation over the thumb MCP joint.  Able to make a composite fist with the unaffected digits.  AIN/PIN/Radial/Ulnar nerves grossly motor intact  Median/radial/ulnar sensory intact to light touch   Palpable radial pulse, digits are warm and well-perfused       Imaging / Studies:    Imaging Results (Last 24 Hours)       Procedure Component Value Units Date/Time    XR Finger 2+ View Right [[id number]] Resulted: 02/25/25 1156     Updated: 02/25/25 1156    Narrative:      Right Thumb X-Ray    Indication: Pain    Views:  PA, Lateral, Oblique    Comparison:  Intra op     Findings:  Status post trapeziectomy and thumb MCP joint fusion.  No interval change   in alignment from intraoperative imaging.  No hardware complication.               Assessment / Plan    Assessment/Plan:   Laine Hernandez is a 55 y.o. female status post right thumb CMC arthroplasty with MCP joint fusion, DOS 2/12/2025.    Patient has done well postoperatively.  Suture tails were trimmed.  She be met the hand therapist today for a custom thermoplastic thumb spica splint.  She will begin working with the hand therapist.  I will see her back in 4 weeks with repeat x-rays and reevaluation of her pain and motion.  We will transition her into a hand-based short opponens splint with vigorous activity at that time.  All questions and concerns were addressed.  She is agreeable.      ICD-10-CM ICD-9-CM   1. Arthritis of carpometacarpal (CMC) joint of thumb  M18.10 716.94   2. Surgery follow-up  Z09 V67.00       Follow Up:   Return in about 4 weeks  (around 3/25/2025) for Follow Up- with repeat xrays.      Dasia Martínez MD  [facility] [place] [facility]

## 2025-02-25 NOTE — PROGRESS NOTES
Laine Hernandez 1970   Diagnosis/ Surgery: status post Right first carpometacarpal joint interposition arthroplasty with APL to FCR suspensionplasty and Right thumb MCP joint arthrodesis              Date Of Injury: NA    Date Of Surgery:2/12/25    Hand Dominance: left  History of Present Condition: progressive arthritis. Underwent surgery and here today for immobilization splint.   Medical/Vocational History/ Medications: history of left RTC repair, left CMCJ arthritis, history of melanoma     Pain: 3/10    Edema: moderate   Sensibility: WNL   Wound Status:dorsal right thumb   ROM/ Strength: NT     Splinting:  Patient was measure and fit with a custom fabricated forearm based thumb immobilization splint.    Patient was instructed in wearing schedule, precautions and care of the splint during this visit.   Patient was instructed in proper donning/doffing of splint.   Assessment:  Patient was fitted and appropriate splint was fabricated this date.  Patient reported that splint was comfortable and had no complications with the fit of the splint.  Patient was instructed and patient verbalized understanding of precautions, wear and care of the splint.   Patient demonstrated independent donning/doffing of splint during treatment today.  Goals:  Patient was fitted properly with appropriate splint for diagnosis  Patient was educated on precautions, wear schedule and care of splint  Patient demonstrated independence with donning/doffing of the splint.  Splint was provided to Protect Healing Structures, Restrict Mobility, Improve joint alignment.  Plan:  No additional treatment is required for this patient at this time. The patient is therefore discharged from therapy.  Patient advised to contact therapist with any additional questions or concerns regarding the fit and function of the splint.  Patient will be seen for splint issues as needed   Wear Instructions: Off for hygiene           PT SIGNATURE: Holly Baird  PT   DATE TREATMENT INITIATED: 2/25/2025    Initial Certification  Certification Period: 5/26/2025  I certify that the therapy services are furnished while this patient is under my care.  The services outlined above are required by this patient, and will be reviewed every 90 days.     PHYSICIAN:       DATE:     Please sign and return via fax to [phone].. Thank you, [facility].

## 2025-03-04 ENCOUNTER — TREATMENT (OUTPATIENT)
Dept: PHYSICAL THERAPY | Facility: CLINIC | Age: 55
End: 2025-03-04
Payer: COMMERCIAL

## 2025-03-04 DIAGNOSIS — Z47.89 ORTHOPEDIC AFTERCARE: ICD-10-CM

## 2025-03-04 DIAGNOSIS — M18.11 ARTHRITIS OF CARPOMETACARPAL (CMC) JOINT OF RIGHT THUMB: Primary | ICD-10-CM

## 2025-03-04 PROCEDURE — 97110 THERAPEUTIC EXERCISES: CPT | Performed by: PHYSICAL THERAPIST

## 2025-03-04 PROCEDURE — 97535 SELF CARE MNGMENT TRAINING: CPT | Performed by: PHYSICAL THERAPIST

## 2025-03-04 PROCEDURE — 97161 PT EVAL LOW COMPLEX 20 MIN: CPT | Performed by: PHYSICAL THERAPIST

## 2025-03-04 NOTE — PROGRESS NOTES
"  Physical Therapy Initial Evaluation and Plan of Care  [address] [place] [address]                  [phone]    Patient: Laine Hernandez   : 1970  Diagnosis/ICD-10 Code:  Arthritis of carpometacarpal (CMC) joint of right thumb [M18.11]  Referring practitioner: Dasia Martínez MD    Subjective Evaluation    History of Present Illness  Date of surgery: 2025  Mechanism of injury: status post Right first carpometacarpal joint interposition arthroplasty with APL to FCR suspensionplasty and Right thumb MCP joint arthrodesis         Initially noticed in in 2018 in both hands. Got shots for a few years. Diclofenac worked well until recently. Noticed decreased  strength and pain. Continues to have pain and weakness on L side. Numb and \"pins and needles\" on R side today.      Patient Occupation: No Quality of life: fair    Pain  Current pain ratin  At best pain ratin  At worst pain ratin  Location: Right thumb  Quality: needle-like, dull ache, sharp and discomfort  Relieving factors: medications, rest, relaxation and ice  Aggravating factors: movement  Progression: no change    Social Support  Lives in: one-story house  Lives with: spouse    Hand dominance: left    Patient Goals  Patient goals for therapy: decreased edema, decreased pain, increased motion, increased strength, independence with ADLs/IADLs, return to sport/leisure activities and return to work  Patient goal: Farm chores, art, gardening           Objective          Observations     Right Wrist/Hand   Positive for adhesive scar and edema.     Additional Wrist/Hand Observation Details  Incision site well healed. Edema minimal     Neurological Testing     Sensation     Wrist/Hand     Right   Diminished: light touch    Active Range of Motion     Left Wrist   Wrist flexion: 86 degrees   Wrist extension: 75 degrees   Radial deviation: 20 degrees   Ulnar deviation: 28 degrees     Right Wrist   Wrist " flexion: 88 degrees   Wrist extension: 56 degrees   Radial deviation: 6 degrees   Ulnar deviation: 36 degrees     Left Thumb   Flexion     DIP: 80 degrees    Right Thumb   Flexion     MP: 25 degrees    DIP: 15 degrees  Palmar Abduction    CMC: 30 degrees  Radial Abduction    CMC: 30 degrees  Opposition: 7/10    Strength/Myotome Testing     Right Wrist/Hand   Wrist extension: 4+  Wrist flexion: 4+  Radial deviation: 4+  Ulnar deviation: 4+    Additional Strength Details  Will  and pinch test at a later date per surgical protocol.           Assessment & Plan       Assessment  Impairments: abnormal coordination, abnormal muscle firing, abnormal muscle tone, abnormal or restricted ROM, activity intolerance, impaired physical strength, lacks appropriate home exercise program, pain with function and weight-bearing intolerance   Functional limitations: carrying objects, lifting, sleeping, pulling, pushing, uncomfortable because of pain and unable to perform repetitive tasks   Assessment details: Patient is a 55 year old female presenting s/p right first CMCJ arthroplasty with APL to FCR suspensionplasty and right thumb MCP joint arthrodesis. She has been wearing an over the counter brace for comfort which provides adequate stability to thumb MP and CMCJs. Signs and symptoms are typical for this procedure including loss of thumb and wrist motion, loss of  and pinch strength, and loss of function. She is appropriate for physical therapy to address these deficits and return to functional use of hand per surgical protocol.   Prognosis: good  Prognosis details:            SHORT TERM GOALS:  4 weeks  1.  Pt reports 3/10 pain with wrist movement.   2.  Pt has  strength of at least 10 lbs on the right.  3.  Pt has an increase in wrist radial deviation of 15 degrees.        LONG TERM GOALS:    12 weeks   1.  Pt is independent with final HEP.   2.  Pt is able to  at least 75% of contralateral hand without wrist  "pain.  3.  Pt is able to complete ADLs independently.   4.  Pt does not experience any numbness in his right hand.   5.  Pt AROM is within normal limits for wrist flex/ext, ulnar and radial deviation.         Plan  Therapy options: will be seen for skilled therapy services  Planned modality interventions: high voltage pulsed current (spasm management), high voltage pulsed current (pain management), cryotherapy, contrast bath immersion, thermotherapy (hydrocollator packs) and TENS  Planned therapy interventions: therapeutic activities, stretching, strengthening, spinal/joint mobilization, soft tissue mobilization, postural training, orthotic fitting/training, neuromuscular re-education, motor coordination training, manual therapy, abdominal trunk stabilization, ADL retraining, balance/weight-bearing training, joint mobilization, IADL retraining, home exercise program, functional ROM exercises, flexibility, fine motor coordination training and body mechanics training  Frequency: 2x week  Duration in weeks: 12  Treatment plan discussed with: patient            Access Code: [email or web address]  URL: [email or web address]  Date: 03/04/2025  Prepared by: Holly Baird    Exercises  - Finger Spreading  - 2-3 x daily - 7 x weekly - 10 reps  - Seated Thumb IP Flexion AROM with Blocking  - 2-3 x daily - 7 x weekly - 10 reps  - Thumb AROM: Palmar Abduction  - 2-3 x daily - 7 x weekly - 10 reps  - Thumb AROM Opposition  - 2-3 x daily - 7 x weekly - 10 reps  - First Dorsal Interosseous AROM  - 1 x daily - 7 x weekly - 10 reps  - Thumb Stabilization: \"C\" Position Isometric  - 1 x daily - 7 x weekly - 10 reps - 5 hold  - Thumb Stabilization: Rolling Ball in Hand (NMRE)  - 1 x daily - 7 x weekly    Manual Therapy:         mins  73451;  Therapeutic Exercise:    35     mins  73609;     Neuromuscular Yemi:        mins  20004;    Therapeutic Activity:          mins  48412;     Gait Training:          mins  63757;     Ultrasound:          " mins  01013;    Electrical Stimulation:         mins  10274 ( );  Dry Needling          mins self-pay  Self care   10 min    Timed Treatment:   45   mins   Total Treatment:     60   mins    PT SIGNATURE: Hollyelva Baird, PT   DATE TREATMENT INITIATED: 3/4/2025    Initial Certification  Certification Period: 6/2/2025  I certify that the therapy services are furnished while this patient is under my care.  The services outlined above are required by this patient, and will be reviewed every 90 days.     PHYSICIAN: Dasia Martínez MD      DATE:     Please sign and return via fax to [phone].. Thank you, [facility].

## 2025-03-11 ENCOUNTER — TREATMENT (OUTPATIENT)
Dept: PHYSICAL THERAPY | Facility: CLINIC | Age: 55
End: 2025-03-11
Payer: COMMERCIAL

## 2025-03-11 DIAGNOSIS — M18.11 ARTHRITIS OF CARPOMETACARPAL (CMC) JOINT OF RIGHT THUMB: Primary | ICD-10-CM

## 2025-03-11 DIAGNOSIS — Z47.89 ORTHOPEDIC AFTERCARE: ICD-10-CM

## 2025-03-11 NOTE — PROGRESS NOTES
Physical Therapy Daily Treatment Note         [address] [place] [address]    Patient: Laine Hernandez   : 1970  Diagnosis/ICD-10 Code:  Arthritis of carpometacarpal (CMC) joint of right thumb [M18.11]  Referring practitioner: Dasia Martínez MD  Date of Initial Visit: Type: THERAPY  Noted: 3/4/2025  Today's Date: 3/11/2025  Patient seen for 2 sessions         Laine Hernandez reports: thumb has been very uncomfortable this week.        Objective   Right thumb:  MP: 30 deg  DIP: 20 deg  Palmar abduction: 30 deg   Radial abduction: 32 deg  Opposition: 8/10    See Exercise, Manual, and Modality Logs for complete treatment.       Assessment/Plan  Patient demonstrated improved ROM today. Patient responded well to scar massage, AROM, and proprioception exercises. Plan to assess ROM again next time. Splint was altered to open dorsal section of thumb and a soft strap was added.  Progress per Plan of Care and Progress strengthening /stabilization /functional activity           Manual Therapy:    10     mins  10772;  Therapeutic Exercise:    45     mins  28142;     Neuromuscular Yemi:        mins  16979;    Therapeutic Activity:          mins  98932;     Gait Training:           mins  59471;     Ultrasound:          mins  32937;    Electrical Stimulation:         mins  90721 ( );  Dry Needling          mins self-pay    Timed Treatment:   55   mins   Total Treatment:     55   mins      Channing Giang Physical Therapist Student completed treatment under my direct supervision.     2025      Holly Baird, PT  Physical Therapist

## 2025-03-19 DIAGNOSIS — F51.01 PRIMARY INSOMNIA: ICD-10-CM

## 2025-03-19 RX ORDER — TRAZODONE HYDROCHLORIDE 50 MG/1
50 TABLET ORAL NIGHTLY
Qty: 90 TABLET | Refills: 0 | Status: SHIPPED | OUTPATIENT
Start: 2025-03-19

## 2025-03-19 NOTE — TELEPHONE ENCOUNTER
Rx Refill Note  Requested Prescriptions     Pending Prescriptions Disp Refills    traZODone (DESYREL) 50 MG tablet [Pharmacy Med Name: traZODone 50 MG TABLET] 90 tablet 0     Sig: TAKE ONE TABLET BY MOUTH ONCE NIGHTLY      Last office visit with prescribing clinician: 12/10/2024   Last telemedicine visit with prescribing clinician: Visit date not found   Next office visit with prescribing clinician: 6/17/2025                         Would you like a call back once the refill request has been completed: [] Yes [] No    If the office needs to give you a call back, can they leave a voicemail: [] Yes [] No    Azul Short MA  03/19/25, 08:30 EDT

## 2025-03-25 ENCOUNTER — TREATMENT (OUTPATIENT)
Dept: PHYSICAL THERAPY | Facility: CLINIC | Age: 55
End: 2025-03-25
Payer: COMMERCIAL

## 2025-03-25 DIAGNOSIS — Z47.89 ORTHOPEDIC AFTERCARE: ICD-10-CM

## 2025-03-25 DIAGNOSIS — M18.11 ARTHRITIS OF CARPOMETACARPAL (CMC) JOINT OF RIGHT THUMB: Primary | ICD-10-CM

## 2025-03-25 PROCEDURE — 97110 THERAPEUTIC EXERCISES: CPT | Performed by: PHYSICAL THERAPIST

## 2025-03-25 PROCEDURE — 97140 MANUAL THERAPY 1/> REGIONS: CPT | Performed by: PHYSICAL THERAPIST

## 2025-03-25 NOTE — PROGRESS NOTES
"   Physical Therapy Daily Treatment Note         [address] [place] [address]    Patient: Laine Hernandez   : 1970  Diagnosis/ICD-10 Code:  Arthritis of carpometacarpal (CMC) joint of right thumb [M18.11]  Referring practitioner: Dasia Martínez MD  Date of Initial Visit: Type: THERAPY  Noted: 3/4/2025  Today's Date: 3/25/2025  Patient seen for 3 sessions         Laine Hernandez reports: Feeling \"not great\" today. She expresses feeling better once things warm up, but is frustrated with the stiffness and discomfort.        Objective   See Exercise, Manual, and Modality Logs for complete treatment.       Assessment/Plan  Patient responded well to passive ROM of MP and IP joints for the first time today. She also responded well to the use of moist heat before mobilizations. Plan to continue building load as she moves beyond the 6 week post-op luana.   Progress per Plan of Care and Progress strengthening /stabilization /functional activity           Manual Therapy:    15     mins  87545;  Therapeutic Exercise:    30  mins  03999;     Neuromuscular Yemi:        mins  33258;    Therapeutic Activity:          mins  66118;     Gait Training:           mins  96742;     Ultrasound:          mins  81795;    Electrical Stimulation:         mins  00477 ( );  Dry Needling          mins self-pay  Moist heat:                       8 min    Timed Treatment:   45   mins   Total Treatment:     53   mins    Channing Giang Physical Therapist Student completed treatment under my direct supervision.     2025      Holly Baird, PT  Physical Therapist                    "

## 2025-04-01 ENCOUNTER — OFFICE VISIT (OUTPATIENT)
Age: 55
End: 2025-04-01
Payer: COMMERCIAL

## 2025-04-01 DIAGNOSIS — Z09 SURGERY FOLLOW-UP: ICD-10-CM

## 2025-04-01 DIAGNOSIS — M18.11 ARTHRITIS OF CARPOMETACARPAL (CMC) JOINT OF RIGHT THUMB: Primary | ICD-10-CM

## 2025-04-01 DIAGNOSIS — M25.341: ICD-10-CM

## 2025-04-01 PROCEDURE — 99024 POSTOP FOLLOW-UP VISIT: CPT | Performed by: STUDENT IN AN ORGANIZED HEALTH CARE EDUCATION/TRAINING PROGRAM

## 2025-04-01 RX ORDER — CEPHALEXIN 500 MG/1
CAPSULE ORAL
COMMUNITY
Start: 2025-03-27

## 2025-04-01 NOTE — PROGRESS NOTES
[facility]     Post Operative Office Visit      Date: 04/01/2025   Patient Name: Laine Hernandez  MRN: [id number]  YOB: 1970    Chief Complaint:   Chief Complaint   Patient presents with    Post-op     5 week follow up -- 7 week status post Right first carpometacarpal joint interposition arthroplasty with APL to FCR suspensionplasty and Right thumb MCP joint arthrodesis - DOS 2/12/25     History of Present Illness:   Laine Hernandez is a 55 y.o. female status post  right thumb CMC arthroplasty with MCP joint fusion, DOS 2/12/2025.  Ports doing well since her last clinic visit.  She still complains of thumb pain and stiffness.  Stiffness is worse in the thumb IP joint.  Her pain is worse in the evening after a day of use.  She has been using her brace intermittently over the past several days that has been cut down to her hand-based splint.  Overall she does feel that she is improving with time.  No other complaints.    Subjective   Review of Systems:   Review of Systems   Constitutional:  Negative for chills, fever, unexpected weight gain and unexpected weight loss.   HENT:  Negative for congestion, postnasal drip and rhinorrhea.    Eyes:  Negative for blurred vision.   Respiratory:  Negative for shortness of breath.    Cardiovascular:  Negative for leg swelling.   Gastrointestinal:  Negative for abdominal pain, nausea and vomiting.   Genitourinary:  Negative for difficulty urinating.   Musculoskeletal:  Positive for arthralgias. Negative for gait problem, joint swelling and myalgias.   Skin:  Negative for skin lesions and wound.   Neurological:  Negative for dizziness, weakness, light-headedness and numbness.   Hematological:  Does not bruise/bleed easily.   Psychiatric/Behavioral:  Negative for depressed mood.         I reviewed the patient's chief complaint, history of present illness, review of systems, past  medical history, surgical history, family history, social history, medications and allergy list in the EMR on 04/01/2025 and agree with the findings above.    Objective    Vital Signs: There were no vitals filed for this visit.    General Appearance: No acute distress. Alert and oriented.     Ortho Exam:  Examination of the right Upper Extremity:   Skin examination: Healing surgical incisions over the volar aspect of the thumb CMC joint and dorsally over the thumb MCP joint.  These are nontender to palpation.  The thumb MCP joint is stable and minimally tender.  Tender at the thumb IP joint with decreased active and passive range of motion.  Able to make a composite fist and oppose the thumb to the small finger  AIN/PIN/Radial/Ulnar nerves grossly motor intact  Median/radial/ulnar sensory intact to light touch   Palpable radial pulse, digits are warm and well-perfused       Imaging / Studies:    Imaging Results (Last 24 Hours)       Procedure Component Value Units Date/Time    XR Finger 2+ View Right [[id number]] Resulted: 04/01/25 1120     Updated: 04/01/25 1120    Narrative:      Right Thumb X-Ray    Indication: Pain    Views:  PA, Lateral, Oblique    Comparison:  2/25/25    Findings:  Status post trapeziectomy. Intramedullary screw fixation for MCP joint   fusion that is well aligned with no significant change from prior imaging.               Assessment / Plan    Assessment/Plan:   Laine Hernandez is a 55 y.o. female status post right thumb CMC arthroplasty with MCP joint fusion, DOS 2/12/2025.     Overall the patient has done well postoperatively.  She is most bothered by thumb IP joint stiffness.  I like her to continue to work with the therapist on motion.  I discussed the importance of moist heat to improve joint stiffness.  Her x-rays demonstrate satisfactory alignment and positioning of her fusion site.  She will continue to wear her hand-based splint as needed with vigorous activity.  I will see her  back in 6 weeks with repeat x-rays.  All question concerns were addressed.  She is agreeable.      ICD-10-CM ICD-9-CM   1. Arthritis of carpometacarpal (CMC) joint of right thumb  M18.11 716.94   2. Chronic instability of first MCP joint, right  M25.341 718.84   3. Surgery follow-up  Z09 V67.00       Follow Up:   Return in about 6 weeks (around 5/13/2025) for Follow Up- with repeat xrays.      Dasia Martínez MD  [facility] Hand Surgeon

## 2025-04-03 ENCOUNTER — TREATMENT (OUTPATIENT)
Dept: PHYSICAL THERAPY | Facility: CLINIC | Age: 55
End: 2025-04-03
Payer: COMMERCIAL

## 2025-04-03 DIAGNOSIS — M18.11 ARTHRITIS OF CARPOMETACARPAL (CMC) JOINT OF RIGHT THUMB: Primary | ICD-10-CM

## 2025-04-03 DIAGNOSIS — Z47.89 ORTHOPEDIC AFTERCARE: ICD-10-CM

## 2025-04-03 NOTE — PROGRESS NOTES
Physical Therapy Daily Treatment Note         [address] [place] [address]    Patient: Laine Hernandez   : 1970  Diagnosis/ICD-10 Code:  Arthritis of carpometacarpal (CMC) joint of right thumb [M18.11]  Referring practitioner: Dasia Martínez MD  Date of Initial Visit: Type: THERAPY  Noted: 3/4/2025  Today's Date: 4/3/2025  Patient seen for 4 sessions         Laine Hernandez reports: IP joint hurts for a little while immediately after exercises but is constantly stiff.        Objective   Thump MP flexion: 40 deg  Thumb IP flexion: 12 deg    See Exercise, Manual, and Modality Logs for complete treatment.       Assessment/Plan  Patient demonstrated improved MP flexion and slightly worse IP flexion. She did report feeling stiff and sore after adding new exercises last time at her IP. She responded well to continued interventions from last time. Plan to continue progressing as tolerated and focusing on IP ROM.  Progress per Plan of Care and Progress strengthening /stabilization /functional activity           Manual Therapy:    15     mins  52559;  Therapeutic Exercise:    15     mins  65565;     Neuromuscular Yemi:        mins  94340;    Therapeutic Activity:     15     mins  20167;     Gait Training:           mins  89329;     Ultrasound:          mins  70410;    Electrical Stimulation:         mins  58528 ( );  Dry Needling          mins self-pay  Paraffin:                        5 min    Timed Treatment:   45   mins   Total Treatment:     50   mins    Channing Giang Physical Therapist Student completed treatment under my direct supervision.     2025      Holly Baird, PT  Physical Therapist

## 2025-04-08 ENCOUNTER — TREATMENT (OUTPATIENT)
Dept: PHYSICAL THERAPY | Facility: CLINIC | Age: 55
End: 2025-04-08
Payer: COMMERCIAL

## 2025-04-08 DIAGNOSIS — M18.11 ARTHRITIS OF CARPOMETACARPAL (CMC) JOINT OF RIGHT THUMB: Primary | ICD-10-CM

## 2025-04-08 DIAGNOSIS — Z47.89 ORTHOPEDIC AFTERCARE: ICD-10-CM

## 2025-04-08 PROCEDURE — 97110 THERAPEUTIC EXERCISES: CPT | Performed by: PHYSICAL THERAPIST

## 2025-04-08 PROCEDURE — 97140 MANUAL THERAPY 1/> REGIONS: CPT | Performed by: PHYSICAL THERAPIST

## 2025-04-08 PROCEDURE — 97530 THERAPEUTIC ACTIVITIES: CPT | Performed by: PHYSICAL THERAPIST

## 2025-04-08 NOTE — PROGRESS NOTES
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 IP of R thumb is particularly stiff today.        Objective   See Exercise, Manual, and Modality Logs for complete treatment.       Assessment/Plan  Patient continues to respond well to PROM at IP joint to improve flexion. She also continues to respond well to light resistance exercises. Plan to continue increasing resistance as appropriate.  Progress per Plan of Care and Progress strengthening /stabilization /functional activity           Manual Therapy:    15     mins  58819;  Therapeutic Exercise:    15     mins  29272;     Neuromuscular Yemi:        mins  74266;    Therapeutic Activity:     15     mins  12935;     Gait Training:           mins  61146;     Ultrasound:          mins  25345;    Electrical Stimulation:         mins  97123 ( );  Dry Needling          mins self-pay  Moist heat:                    5 min    Timed Treatment:   45   mins   Total Treatment:     50   mins      Channing Giang Physical Therapist Student completed treatment under my direct supervision.     2025    Holyl Baird, PT  Physical Therapist

## 2025-04-11 ENCOUNTER — TELEPHONE (OUTPATIENT)
Dept: INTERNAL MEDICINE | Facility: CLINIC | Age: 55
End: 2025-04-11
Payer: COMMERCIAL

## 2025-04-11 NOTE — TELEPHONE ENCOUNTER
Advised patient via my chart that I received a prior authorization for the medication  PHENDIMETRAZINE TARTRATE 35 mg tablet. I submitted the request to the insurance company and once I have a response I will let her know.

## 2025-04-14 RX ORDER — NITROFURANTOIN 25; 75 MG/1; MG/1
100 CAPSULE ORAL 2 TIMES DAILY
Qty: 6 CAPSULE | Refills: 0 | Status: SHIPPED | OUTPATIENT
Start: 2025-04-14 | End: 2025-04-17

## 2025-04-15 ENCOUNTER — TREATMENT (OUTPATIENT)
Dept: PHYSICAL THERAPY | Facility: CLINIC | Age: 55
End: 2025-04-15
Payer: COMMERCIAL

## 2025-04-15 DIAGNOSIS — M18.11 ARTHRITIS OF CARPOMETACARPAL (CMC) JOINT OF RIGHT THUMB: Primary | ICD-10-CM

## 2025-04-15 DIAGNOSIS — Z47.89 ORTHOPEDIC AFTERCARE: ICD-10-CM

## 2025-04-15 PROCEDURE — 97164 PT RE-EVAL EST PLAN CARE: CPT | Performed by: PHYSICAL THERAPIST

## 2025-04-15 PROCEDURE — 97530 THERAPEUTIC ACTIVITIES: CPT | Performed by: PHYSICAL THERAPIST

## 2025-04-15 PROCEDURE — 97140 MANUAL THERAPY 1/> REGIONS: CPT | Performed by: PHYSICAL THERAPIST

## 2025-04-15 NOTE — PROGRESS NOTES
Re-Assessment / Re-Certification  [address] [place] [address]                       [phone]      Patient: Laine Hernandez   : 1970  Diagnosis/ICD-10 Code:  Arthritis of carpometacarpal (CMC) joint of right thumb [M18.11]  Referring practitioner: Dasia Martínez MD  Date of Initial Visit: 4/15/2025  Today's Date: 4/15/2025  Patient seen for 6 sessions      Subjective:   Laine Hernandez reports: Feeling sore today after having a very busy weekend.  Subjective Questionnaire: QuickDASH: 61.36   Clinical Progress: improved  Home Program Compliance: Yes  Treatment has included: therapeutic exercise, neuromuscular re-education, manual therapy, therapeutic activity, moist heat, and cryotherapy      Objective          Active Range of Motion     Right Wrist   Wrist flexion: 98 degrees   Wrist extension: 72 degrees   Radial deviation: 28 degrees   Ulnar deviation: 45 degrees     Right Thumb   Flexion     MP: 38 degrees    DIP: 15 degrees  Palmar Abduction    CMC: 32 degrees  Radial Abduction    CMC: 40 degrees  Opposition: 8/10    Strength/Myotome Testing     Left Wrist/Hand      (2nd hand position)   Left  strength (2nd hand position) 42 lbs    Thumb Strength  Key/Lateral Pinch     Trial 1: 12 lbs    Right Wrist/Hand      (2nd hand position)   Right  strength (2nd hand position) 21 lbs    Thumb Strength   Key/Lateral Pinch     Trial 1: 5 lbs      Assessment & Plan       Assessment  Impairments: abnormal or restricted ROM, activity intolerance, impaired physical strength, pain with function and weight-bearing intolerance   Functional limitations: carrying objects, lifting, pulling, pushing and uncomfortable because of pain   Assessment details: Today, Paint has demonstrated improved ROM in all directions at the wrist. She has slightly improved ROM at the IP, MP, and CMC joint of the right thumb but is still very limited. She demonstrates diminished  and lateral  pinch strength on the right compared to the left. 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 Plan to focus more on manual interventions to improve IP, MP, and CMC joint mobility on the left. Plan to gradually increase gripping intervention volume.  Prognosis: good    Plan  Therapy options: will be seen for skilled therapy services  Planned modality interventions: cryotherapy, high voltage pulsed current (pain management), TENS, thermotherapy (hydrocollator packs) and thermotherapy (paraffin bath)  Planned therapy interventions: balance/weight-bearing training, body mechanics training, fine motor coordination training, flexibility, functional ROM exercises, home exercise program, IADL retraining, joint mobilization, manual therapy, motor coordination training, neuromuscular re-education, soft tissue mobilization, strengthening, stretching and therapeutic activities  Frequency: 2x week  Duration in weeks: 12  Treatment plan discussed with: patient      Progress toward previous goals: Partially Met    Previous Goals   SHORT TERM GOALS:  4 weeks  1.  Pt reports 3/10 pain with wrist movement.  2.  Pt has  strength of at least 10 lbs on the right.- MET  3.  Pt has an increase in wrist radial deviation of 15 degrees. - MET                            LONG TERM GOALS:    12 weeks      1.  Pt is independent with final HEP.   2.  Pt is able to  at least 75% of contralateral hand without wrist pain.  3.  Pt is able to complete ADLs independently.   4.  Pt does not experience any numbness in his right hand.   5.  Pt AROM is within normal limits for wrist flex/ext, ulnar and radial deviation.          Recommendations: Continue as planned  Timeframe: 6 weeks  Prognosis to achieve goals: nayely Giang, Physical Therapist Student  completed treatment under my direct supervision.     04/15/2025      PT Signature: Holly Baird, TYRELL      Based upon review of the patient's progress and continued therapy plan, it is my medical opinion that Laine Hernandez should continue physical therapy treatment at [facility]  [address] [address] [phone].    Signature: __________________________________  Dasia Martínez MD    Manual Therapy:    25     mins  83048;  Therapeutic Exercise:         mins  95843;     Neuromuscular Yemi:        mins  72155;    Therapeutic Activity:    20      mins  61253;     Gait Training:           mins  88613;     Ultrasound:          mins  89291;    Electrical Stimulation:         mins  35217 ( );  Dry Needling          mins self-pay  Re-evaluation:               10 min  Paraffin:                          5 min    Timed Treatment:   45   mins   Total Treatment:     60   mins

## 2025-04-16 ENCOUNTER — OFFICE VISIT (OUTPATIENT)
Dept: OBSTETRICS AND GYNECOLOGY | Facility: CLINIC | Age: 55
End: 2025-04-16
Payer: COMMERCIAL

## 2025-04-16 VITALS
BODY MASS INDEX: 30.18 KG/M2 | HEIGHT: 66 IN | SYSTOLIC BLOOD PRESSURE: 102 MMHG | WEIGHT: 187.8 LBS | DIASTOLIC BLOOD PRESSURE: 62 MMHG

## 2025-04-16 DIAGNOSIS — N95.2 POST-MENOPAUSAL ATROPHIC VAGINITIS: Chronic | ICD-10-CM

## 2025-04-16 DIAGNOSIS — N95.8 GENITOURINARY SYNDROME OF MENOPAUSE: ICD-10-CM

## 2025-04-16 DIAGNOSIS — R30.0 DYSURIA: Primary | ICD-10-CM

## 2025-04-16 DIAGNOSIS — N94.10 FEMALE DYSPAREUNIA: ICD-10-CM

## 2025-04-16 DIAGNOSIS — Z53.21 PATIENT LEFT WITHOUT BEING SEEN: ICD-10-CM

## 2025-04-16 LAB
BILIRUB BLD-MCNC: NEGATIVE MG/DL
CLARITY, POC: CLEAR
COLOR UR: YELLOW
GLUCOSE UR STRIP-MCNC: NEGATIVE MG/DL
KETONES UR QL: NEGATIVE
LEUKOCYTE EST, POC: NEGATIVE
NITRITE UR-MCNC: NEGATIVE MG/ML
PH UR: 6 [PH] (ref 5–8)
PROT UR STRIP-MCNC: NEGATIVE MG/DL
RBC # UR STRIP: NEGATIVE /UL
SP GR UR: 1.01 (ref 1–1.03)
UROBILINOGEN UR QL: NORMAL

## 2025-04-16 RX ORDER — PROGESTERONE 100 MG/1
100 CAPSULE ORAL DAILY
Qty: 30 CAPSULE | Refills: 11 | Status: CANCELLED | OUTPATIENT
Start: 2025-04-16

## 2025-04-16 RX ORDER — ESTRADIOL 10 UG/1
1 TABLET, FILM COATED VAGINAL DAILY
Qty: 30 TABLET | Refills: 11 | Status: CANCELLED | OUTPATIENT
Start: 2025-04-16 | End: 2026-04-16

## 2025-04-16 NOTE — PROGRESS NOTES
Gynecologic Annual Exam Note          GYN Annual Exam     Gynecologic Exam and Left Without Being Seen        Subjective     HPI  Laine Hernandez is a 55 y.o. female, , who presents for annual well woman exam as a established patient. There were no changes to her medical or surgical history since her last visit. No LMP recorded. Patient has had an ablation.   Her periods are absent secondary to endometrial ablation.  Marital Status: . She is sexually active. She has not had new partners.. STD testing recommendations have been explained to the patient and she declines STD testing.    The patient would like to discuss the following complaints today: Vaginal dryness, Dyspareunia, Decreased libido, Hot flashes, Mood swings, Brain fog, and Insomnia. Patient is using Vaginal estrogen and Prometrium. Patient states that she is using vaginal estrogen 6-7 nights per week. Patient has tried the estradiol patch in the past, but didn't notice improvement in vasomotor s/s and had difficulty sweating it off. Patient states that her PCP prescribed Evamist, but her pharmacy never had this in stock. Patient also reports dysuria since last Thursday.     Patient wishing to discuss GLP-1 medications. Patient currently taking Phentermine. Patient reports only eating dinner. Patient is eating low carb, low sugar, and no alcohol. Patient states that her meals mainly consist of salad, vegetables, and meat.     Additional OB/GYN History   contraceptive methods: Vasectomy   Desires to: do not start contraception  History of migraines: yes without aura    Last Pap : 2024. Result: negative. HPV: negative.   Last Completed Pap Smear            Upcoming       PAP SMEAR (Every 3 Years) Next due on 2024  LIQUID-BASED PAP SMEAR WITH HPV GENOTYPING REGARDLESS OF INTERPRETATION (ROSSANA,COR,MAD)    2019  Done                          History of abnormal Pap smear:  yes  Family history of uterine,  colon, breast, or ovarian cancer: yes - Mother- colon CA  Performs monthly Self-Breast Exam: yes  Last mammogram: 02/05/2024. Done at . There is a copy in the chart.  Last Completed Mammogram            Upcoming       MAMMOGRAM (Every 2 Years) Next due on 2/5/2026 02/05/2024  Mammo Screening Digital Tomosynthesis Bilateral With CAD                            Colonoscopy: has had a colonoscopy 01/2024- repeat 5 years  Exercises Regularly: yes  Feelings of Anxiety or Depression: no  Tobacco Usage?: No       Current Outpatient Medications:     Ascorbic Acid (VITAMIN C PO), Take  by mouth Daily., Disp: , Rfl:     Cholecalciferol (VITAMIN D3 PO), Take 1,000 Units by mouth 2 (Two) Times a Day. Not in summer months, Disp: , Rfl:     coenzyme Q10 100 MG capsule, Take 1 capsule by mouth Daily., Disp: , Rfl:     diclofenac (VOLTAREN) 50 MG EC tablet, TAKE ONE TABLET BY MOUTH TWICE A DAY AS NEEDED FOR PAIN, Disp: 60 tablet, Rfl: 5    estradiol (Vagifem) 10 MCG tablet vaginal tablet, Insert 1 tablet into the vagina Daily., Disp: 30 tablet, Rfl: 11    fexofenadine (ALLEGRA) 180 MG tablet, Take 1 tablet by mouth Daily As Needed (allergies)., Disp: , Rfl:     Lactobacillus (ACIDOPHILUS PO), Take  by mouth Daily., Disp: , Rfl:     magnesium, as, gluconate (MAGONATE) 500 (27 Mg) MG tablet, Take 1 tablet by mouth Daily., Disp: 90 tablet, Rfl: 1    meclizine (ANTIVERT) 12.5 MG tablet, As Needed., Disp: , Rfl:     Menaquinone-7 (Vitamin K2) 100 MCG capsule, Take 1 capsule by mouth Daily., Disp: , Rfl:     minoxidil (LONITEN) 10 MG tablet, Take 0.5 tablets by mouth Daily., Disp: 45 tablet, Rfl: 1    multivitamin with minerals tablet tablet, Take 1 tablet by mouth Daily., Disp: , Rfl:     niacin 500 MG tablet, Take 3 tablets by mouth Daily., Disp: , Rfl:     Phendimetrazine Tartrate 35 MG tablet, Take 1 tablet by mouth 3 (Three) Times a Day., Disp: , Rfl:     Progesterone (Prometrium) 100 MG capsule, Take 1 capsule by mouth  Daily., Disp: 30 capsule, Rfl: 11    traZODone (DESYREL) 50 MG tablet, TAKE ONE TABLET BY MOUTH ONCE NIGHTLY, Disp: 90 tablet, Rfl: 0    nitrofurantoin, macrocrystal-monohydrate, (Macrobid) 100 MG capsule, Take 1 capsule by mouth 2 (Two) Times a Day for 3 days. (Patient not taking: Reported on 2025), Disp: 6 capsule, Rfl: 0     Patient is requesting refills of Vagifem and Prometrium.    OB History          0    Para   0    Term   0            AB        Living             SAB        IAB        Ectopic        Molar        Multiple        Live Births                    Past Medical History:   Diagnosis Date    Abnormal Pap smear of cervix     ADHD (attention deficit hyperactivity disorder)     Was on meds for a year    Allergic 2005    The usual pollen allergies, plus sulfa antibiotics and latex    Ankle sprain     Arthritis     Osteo arthritis in hands and hips    Cancer 2019    On face - removed in     Cataract     Cervical dysplasia     Class 1 obesity due to excess calories with serious comorbidity and body mass index (BMI) of 30.0 to 30.9 in adult 2022    Class 1 obesity due to excess calories with serious comorbidity and body mass index (BMI) of 31.0 to 31.9 in adult 2022    Depression     Diverticulosis     Just one episode, none since    Fracture, foot 2007    GERD (gastroesophageal reflux disease)     Headache     I only get them 3-4x/year now and they last 12ish hours each    Hip arthrosis 2020    HPV (human papilloma virus) infection     Hyperlipidemia     Menorrhalgia     uterine ablation performed    Migraine     I get ~6 migraines/year that last 24-48 hrs    Neuroma of foot     PMS (premenstrual syndrome) 1986    Rotator cuff syndrome     Rotator cuff surgery in 2024    Tendinitis of knee     I tried running for daily exercise. My knees said no.    Tennis elbow     From walking strong dogs     Varicella 1985        Past Surgical History:   Procedure Laterality Date    AUGMENTATION MAMMAPLASTY Bilateral 2002    SALINE    BREAST SURGERY  2002    augmentation     BUNIONECTOMY      2014 and 2015    COLONOSCOPY  2024    ENDOMETRIAL ABLATION  2005    EYE SURGERY  2000    Lasik    FINGER SURGERY Right 02/12/2025    1. Right first carpometacarpal joint interposition arthroplasty with APL to FCR suspensionplasty 2. Right thumb MCP joint arthrodesis - Dr Martínez    FRACTURE SURGERY  2014 and 2015    Not fractures but bunionectomies    ROTATOR CUFF REPAIR Left 06/17/2024    Dr.Brent Serrano    SHOULDER SURGERY  2024    Rotator cuff repair    SKIN CANCER EXCISION  2018    R cheek melanoma       Health Maintenance   Topic Date Due    Pneumococcal Vaccine 50+ (1 of 1 - PCV) Never done    HEPATITIS C SCREENING  Never done    COVID-19 Vaccine (5 - 2024-25 season) 09/01/2024    Annual Gynecologic Pelvic and Breast Exam  02/14/2025    INFLUENZA VACCINE  07/01/2025    LIPID PANEL  12/06/2025    ANNUAL PHYSICAL  12/10/2025    MAMMOGRAM  02/05/2026    PAP SMEAR  02/13/2027    TDAP/TD VACCINES (2 - Td or Tdap) 04/26/2029    COLORECTAL CANCER SCREENING  01/24/2034    ZOSTER VACCINE  Completed       The additional following portions of the patient's history were reviewed and updated as appropriate: allergies, current medications, past family history, past medical history, past social history, past surgical history, and problem list.    Review of Systems   Constitutional: Negative.    HENT: Negative.     Eyes: Negative.    Respiratory: Negative.     Cardiovascular: Negative.    Gastrointestinal: Negative.    Endocrine: Positive for heat intolerance (Hot flashes).   Genitourinary:  Positive for dyspareunia.   Musculoskeletal: Negative.    Skin: Negative.    Allergic/Immunologic: Negative.    Neurological: Negative.    Hematological: Negative.    Psychiatric/Behavioral: Negative.           I have reviewed and agree with the HPI,  "ROS, anentered above.         Objective   /62   Ht 167.6 cm (66\")   Wt 85.2 kg (187 lb 12.8 oz)   BMI 30.31 kg/m²     Physical Exam       Assessment and Plan    Problem List Items Addressed This Visit          Genitourinary and Reproductive     Post-menopausal atrophic vaginitis (Chronic)    Overview   Continue estradiol patch weekly and progesterone tablet every evening.    She will try Replens vaginal moisturizer.  We also discussed the possibility of compounding a coconut oil vaginal cream.         Relevant Medications    estradiol (Vagifem) 10 MCG tablet vaginal tablet     Other Visit Diagnoses         Dysuria    -  Primary    Relevant Orders    POC Urinalysis Dipstick (Completed)      Female dyspareunia          Genitourinary syndrome of menopause          Patient left without being seen                  Lisa Urbina MD  04/16/2025   The patient left the office after care was provided and did not complete the visit because of the wait time.   "

## 2025-04-18 ENCOUNTER — TELEPHONE (OUTPATIENT)
Dept: OBSTETRICS AND GYNECOLOGY | Facility: CLINIC | Age: 55
End: 2025-04-18
Payer: COMMERCIAL

## 2025-04-18 NOTE — TELEPHONE ENCOUNTER
Spoke with patient and apologized for the miscommunication. Annual scheduled with Urbina at our [facility] location on 5/16 @ 8:30am.

## 2025-04-18 NOTE — TELEPHONE ENCOUNTER
Hub staff attempted to follow warm transfer process and was unsuccessful     Caller: Laine Hernandez    Relationship to patient: Self    Best call back number: [phone] PLEASE CALL BETWEEN 1:30 AND 2:00 PM.    Patient is needing: PLEASE SEE PATIENT MYCHART MESSAGE FROM 04/16/25. PATIENT RETURNED  CALL. HUB UNABLE TO TRANSFER CALL TO CLINICAL.

## 2025-04-22 ENCOUNTER — TREATMENT (OUTPATIENT)
Dept: PHYSICAL THERAPY | Facility: CLINIC | Age: 55
End: 2025-04-22
Payer: COMMERCIAL

## 2025-04-22 ENCOUNTER — OFFICE VISIT (OUTPATIENT)
Age: 55
End: 2025-04-22
Payer: COMMERCIAL

## 2025-04-22 DIAGNOSIS — M18.0 ARTHRITIS OF CARPOMETACARPAL (CMC) JOINT OF BOTH THUMBS: ICD-10-CM

## 2025-04-22 DIAGNOSIS — Z09 SURGERY FOLLOW-UP: ICD-10-CM

## 2025-04-22 DIAGNOSIS — M20.011 MALLET FINGER OF RIGHT HAND: Primary | ICD-10-CM

## 2025-04-22 DIAGNOSIS — S62.524A CLOSED NONDISPLACED FRACTURE OF DISTAL PHALANX OF RIGHT THUMB, INITIAL ENCOUNTER: Primary | ICD-10-CM

## 2025-04-22 PROCEDURE — L3923 HFO WITHOUT JOINTS PRE CST: HCPCS | Performed by: PHYSICAL THERAPIST

## 2025-04-22 RX ORDER — LIDOCAINE HYDROCHLORIDE 10 MG/ML
0.5 INJECTION, SOLUTION EPIDURAL; INFILTRATION; INTRACAUDAL; PERINEURAL
Status: COMPLETED | OUTPATIENT
Start: 2025-04-22 | End: 2025-04-22

## 2025-04-22 RX ORDER — TRIAMCINOLONE ACETONIDE 40 MG/ML
20 INJECTION, SUSPENSION INTRA-ARTICULAR; INTRAMUSCULAR
Status: COMPLETED | OUTPATIENT
Start: 2025-04-22 | End: 2025-04-22

## 2025-04-22 RX ADMIN — LIDOCAINE HYDROCHLORIDE 0.5 ML: 10 INJECTION, SOLUTION EPIDURAL; INFILTRATION; INTRACAUDAL; PERINEURAL at 09:17

## 2025-04-22 RX ADMIN — TRIAMCINOLONE ACETONIDE 20 MG: 40 INJECTION, SUSPENSION INTRA-ARTICULAR; INTRAMUSCULAR at 09:17

## 2025-04-22 NOTE — PROGRESS NOTES
[facility]     Post Operative Office Visit      Date: 04/22/2025   Patient Name: Laine Hernandez  MRN: [id number]  YOB: 1970    Chief Complaint:   Chief Complaint   Patient presents with    Follow-up     3 week follow up - 10 weeks status post Right first carpometacarpal joint interposition arthroplasty with APL to FCR suspensionplasty and Right thumb MCP joint arthrodesis - DOS 2/12/25       History of Present Illness:   Laine Hernandez is a 55 y.o. female status post right thumb CMC arthroplasty with MCP joint fusion, DOS 2/12/2025.  Presents earlier than scheduled follow-up due to an injury to the right thumb.  She reports that she sustained a fall over the weekend injuring her right thumb.  She developed swelling and bruising to the distal fingertip.  She has increased pain and swelling since that time.  She also complains of left thumb basilar pain.  She has been treated with conservative treatments for thumb CMC arthritis but has not had an injection recently.  No other complaints or concerns.    Subjective   Review of Systems:   Review of Systems   Constitutional:  Negative for chills, fever, unexpected weight gain and unexpected weight loss.   HENT:  Negative for congestion, postnasal drip and rhinorrhea.    Eyes:  Negative for blurred vision.   Respiratory:  Negative for shortness of breath.    Cardiovascular:  Negative for leg swelling.   Gastrointestinal:  Negative for abdominal pain, nausea and vomiting.   Genitourinary:  Negative for difficulty urinating.   Musculoskeletal:  Positive for arthralgias. Negative for gait problem, joint swelling and myalgias.   Skin:  Negative for skin lesions and wound.   Neurological:  Negative for dizziness, weakness, light-headedness and numbness.   Hematological:  Does not bruise/bleed easily.   Psychiatric/Behavioral:  Negative for depressed mood.         I reviewed the  patient's chief complaint, history of present illness, review of systems, past medical history, surgical history, family history, social history, medications and allergy list in the EMR on 04/22/2025 and agree with the findings above.    Objective    Vital Signs: There were no vitals filed for this visit.    General Appearance: No acute distress. Alert and oriented.     Ortho Exam:  Examination of the right Upper Extremity:   Skin examination: Well-healed surgical incisions overlying the dorsal aspect of the MCP joint and volarly along the CMC joint.  There is diffuse swelling and ecchymosis to the distal phalanx.  No subungual hematoma.  She is diffusely tender to palpation along the DIP joint.   tender to the thumb MCP joint and along the surgical incisions.  Able to make a composite fist  AIN/PIN/Radial/Ulnar nerves grossly motor intact  Median/radial/ulnar sensory intact to light touch   Palpable radial pulse, digits are warm and well-perfused       Examination of the left upper extremity:   No skin lesions or ecchymosis. No edema.   No thenar, hypothenar, or interosseous wasting appreciated   Tender to palpation over the thumb CMC joint   Nontender to palpation over the scaphoid  Full and painless active flexion/extension/pronosupination of the wrist  Able to make a composite fist  positive Thumb CMC joint grind   negative Thumb MCP joint hyper extension  Firm endpoint with radial/ulnar deviation of the thumb MCP at 0 and 30 degrees of flexion  negative Finkelstein's test  Median/radial/ulnar sensory intact to light touch  Digits are warm and well-perfused           Imaging / Studies:    Imaging Results (Last 24 Hours)       Procedure Component Value Units Date/Time    XR Finger 2+ View Right [[id number]] Resulted: 04/22/25 0906     Updated: 04/22/25 0906    Narrative:      Right Thumb X-Ray    Indication: Pain    Views:  PA, Lateral, Oblique    Comparison:  4/1/256    Impression :  status post trapeziectomy  with suspensioplasty and thumb MCP joint fusion.    Hardware is intact without real change from prior imaging.  There is a   small dorsal cortical avulsion to the distal phalanx representing a bony   mallet finger.  No other acute findings.                 Procedure Note:  After reviewing the risks, benefits and alternatives to a steroid injection, which include but are not limited to; hypopigmentation, fat necrosis/atrophy, pain, swelling, bleeding, bruising, damage to nearby nerves/vessels, allergic reaction , transient elevation in blood glucose levels and infection a verbal consent was obtained. A time-out was then performed and the affected hand was prepped with chlorhexadine soap and ethyl chloride was used to numb the skin. The left thumb CMC joint was injected with 0.5cc: 0.5cc mixture of Kenalog - 40 mg/ml and Lidocaine - 1% / 2 ml. The injection was well tolerated and a sterile dressing was applied. There were no complications. I advised the patient that they might experience some local discomfort for the next couple days and can apply ice to the site as needed.    Assessment / Plan    Assessment/Plan:   Laine Hernandez is a 55 y.o. female status post right thumb CMC arthroplasty with MCP joint fusion, DOS 2/12/2025, with right thumb bony mallet injury and left thumb CMC arthritis..     Unfortunately, the patient has had an acute injury to the right thumb in a sustained a bony mallet finger.  I reviewed the patient her x-rays.  We discussed conservative treatment with full-time IP joint extension splinting.  She will be met by the hand therapist today.  I will see her back in 2 weeks for a skin check.  Additionally, she continues to complain of symptomatic left thumb CMC arthritis.  We discussed options moving forward and she elects to proceed with corticosteroid injection today.  She may continue bracing and anti-inflammatories as needed.  I will see her back in 2 weeks for reevaluation of her right  thumb bony mallet finger.  All question and concerns were addressed.  She is agreeable.      ICD-10-CM ICD-9-CM   1. Mallet finger of right hand  M20.011 736.1   2. Arthritis of carpometacarpal (CMC) joint of both thumbs  M18.0 716.94   3. Surgery follow-up  Z09 V67.00       Follow Up:   Return in about 2 weeks (around 5/6/2025) for Follow Up.      Dasia Martínez MD  [facility]

## 2025-04-22 NOTE — PROGRESS NOTES
Laine Hernandez 1970   Diagnosis/ Surgery: right thumb IP mallet finger               Date Of Injury: 4/19/25    Date Of Surgery:NA    Hand Dominance: right   History of Present Condition: fell over the weekend and sustained right thumb IP fracture. Recent history of right thumb CMCJ arthoplasty that she is being seen in therapy for.   Medical/Vocational History/ Medications: history of left RTC repair, left CMCJ arthritis, melanoma     Pain: moderate     Edema: moderate distal tip  Sensibility: WNL   Wound Status:bruising at dorsal IPJ  ROM/ Strength: NT     Splinting:  Patient was measure and fit with a custom fabricated thumb IPJ cap splint.    Patient was instructed in wearing schedule, precautions and care of the splint during this visit.   Patient was instructed in proper donning/doffing of splint.   Assessment:  Patient was fitted and appropriate splint was fabricated this date.  Patient reported that splint was comfortable and had no complications with the fit of the splint.  Patient was instructed and patient verbalized understanding of precautions, wear and care of the splint.   Patient demonstrated independent donning/doffing of splint during treatment today.  Goals:  Patient was fitted properly with appropriate splint for diagnosis  Patient was educated on precautions, wear schedule and care of splint  Patient demonstrated independence with donning/doffing of the splint.  Splint was provided to Protect Healing Structures, Restrict Mobility, Improve joint alignment.  Plan:  No additional treatment is required for this patient at this time. The patient is therefore discharged from therapy.  Patient advised to contact therapist with any additional questions or concerns regarding the fit and function of the splint.  Patient will be seen for splint issues as needed   Wear Instructions: Off for hygiene supported.           PT SIGNATURE: Holly Baird, PT   DATE TREATMENT INITIATED: 4/22/2025    Initial  Certification  Certification Period: 7/21/2025  I certify that the therapy services are furnished while this patient is under my care.  The services outlined above are required by this patient, and will be reviewed every 90 days.     PHYSICIAN: Dasia Martínez MD      DATE:     Please sign and return via fax to [phone].. Thank you, [facility].

## 2025-04-22 NOTE — PROGRESS NOTES
Procedure   - Hand/Upper Extremity Injection: L thumb CMC for osteoarthritis on 4/22/2025 9:17 AM  Indications: pain  Details: 27 G needle, dorsal approach  Medications: 20 mg triamcinolone acetonide 40 MG/ML; 0.5 mL lidocaine PF 1% 1 %  Outcome: tolerated well, no immediate complications  Procedure, treatment alternatives, risks and benefits explained, specific risks discussed. Consent was given by the patient. Immediately prior to procedure a time out was called to verify the correct patient, procedure, equipment, support staff and site/side marked as required. Patient was prepped and draped in the usual sterile fashion.

## 2025-05-06 ENCOUNTER — OFFICE VISIT (OUTPATIENT)
Age: 55
End: 2025-05-06
Payer: COMMERCIAL

## 2025-05-06 ENCOUNTER — LAB (OUTPATIENT)
Facility: HOSPITAL | Age: 55
End: 2025-05-06
Payer: COMMERCIAL

## 2025-05-06 DIAGNOSIS — M25.341: ICD-10-CM

## 2025-05-06 DIAGNOSIS — Z09 SURGERY FOLLOW-UP: ICD-10-CM

## 2025-05-06 DIAGNOSIS — M20.011 MALLET FINGER OF RIGHT HAND: Primary | ICD-10-CM

## 2025-05-06 DIAGNOSIS — E78.2 MIXED HYPERLIPIDEMIA: Chronic | ICD-10-CM

## 2025-05-06 DIAGNOSIS — M18.11 ARTHRITIS OF CARPOMETACARPAL (CMC) JOINT OF RIGHT THUMB: ICD-10-CM

## 2025-05-06 LAB
ALBUMIN SERPL-MCNC: 4.8 G/DL (ref 3.5–5.2)
ALBUMIN/GLOB SERPL: 1.8 G/DL
ALP SERPL-CCNC: 97 U/L (ref 39–117)
ALT SERPL W P-5'-P-CCNC: 15 U/L (ref 1–33)
ANION GAP SERPL CALCULATED.3IONS-SCNC: 10.9 MMOL/L (ref 5–15)
AST SERPL-CCNC: 20 U/L (ref 1–32)
BILIRUB SERPL-MCNC: 0.6 MG/DL (ref 0–1.2)
BUN SERPL-MCNC: 15 MG/DL (ref 6–20)
BUN/CREAT SERPL: 20 (ref 7–25)
CALCIUM SPEC-SCNC: 9.9 MG/DL (ref 8.6–10.5)
CHLORIDE SERPL-SCNC: 102 MMOL/L (ref 98–107)
CHOLEST SERPL-MCNC: 228 MG/DL (ref 0–200)
CO2 SERPL-SCNC: 27.1 MMOL/L (ref 22–29)
CREAT SERPL-MCNC: 0.75 MG/DL (ref 0.57–1)
EGFRCR SERPLBLD CKD-EPI 2021: 94.2 ML/MIN/1.73
GLOBULIN UR ELPH-MCNC: 2.6 GM/DL
GLUCOSE SERPL-MCNC: 101 MG/DL (ref 65–99)
HDLC SERPL-MCNC: 69 MG/DL (ref 40–60)
LDLC SERPL CALC-MCNC: 139 MG/DL (ref 0–100)
LDLC/HDLC SERPL: 1.97 {RATIO}
POTASSIUM SERPL-SCNC: 4.6 MMOL/L (ref 3.5–5.2)
PROT SERPL-MCNC: 7.4 G/DL (ref 6–8.5)
SODIUM SERPL-SCNC: 140 MMOL/L (ref 136–145)
TRIGL SERPL-MCNC: 116 MG/DL (ref 0–150)
VLDLC SERPL-MCNC: 20 MG/DL (ref 5–40)

## 2025-05-06 PROCEDURE — 80061 LIPID PANEL: CPT

## 2025-05-06 PROCEDURE — 80053 COMPREHEN METABOLIC PANEL: CPT

## 2025-05-06 PROCEDURE — 99024 POSTOP FOLLOW-UP VISIT: CPT | Performed by: STUDENT IN AN ORGANIZED HEALTH CARE EDUCATION/TRAINING PROGRAM

## 2025-05-06 NOTE — PROGRESS NOTES
[facility]     Post Operative Office Visit      Date: 05/06/2025   Patient Name: Laine Hernandez  MRN: [id number]  YOB: 1970    Chief Complaint:   Chief Complaint   Patient presents with    Post-op     3 week follow up ;10 weeks status post Right first carpometacarpal joint interposition arthroplasty with APL to FCR suspensionplasty and Right thumb MCP joint arthrodesis - DOS 2/12/25     History of Present Illness:   Laine Hernandez is a 55 y.o. female status post right thumb CMC arthroplasty with MCP joint fusion, DOS 2/12/2025, with right thumb bony mallet injury and left thumb CMC arthritis at the patient's last clinic visit she was placed into a custom thumb IP joint extension splint per the hand therapist.  She reports that she has been wearing the IP joint extension splint.  She has difficulty keeping this on but does feel that her pain and swelling are improving with time.  No other complaints or concerns.    Subjective   Review of Systems:   Review of Systems     I reviewed the patient's chief complaint, history of present illness, review of systems, past medical history, surgical history, family history, social history, medications and allergy list in the EMR on 05/06/2025 and agree with the findings above.    Objective    Vital Signs: There were no vitals filed for this visit.    General Appearance: No acute distress. Alert and oriented.     Ortho Exam:  Examination of the right Upper Extremity:   Skin examination: Well-healed surgical incisions overlying the dorsal aspect of the MCP joint and volar aspect of the thumb CMC joint.  There is no pain dorsally to the thumb IP joint or along the thumb MCP and CMC joints.   decreased active motion of the thumb IP joint.  Able to make a composite fist  AIN/PIN/Radial/Ulnar nerves grossly motor intact  Median/radial/ulnar sensory intact to light touch   Palpable  radial pulse, digits are warm and well-perfused       Imaging / Studies:    Imaging Results (Last 24 Hours)       Procedure Component Value Units Date/Time    XR Finger 2+ View Right [[id number]] Resulted: 05/06/25 0948     Updated: 05/06/25 0948    Narrative:      Right Thumb X-Ray    Indication: Pain    Views:  PA, Lateral, Oblique    Comparison:  4/22/25    Impression:  Status post trapeziectomy and thumb MCP joint fusion.  The hardware is   well aligned.  Evidence of fusion.  No change in position of distal   phalanx dorsal avulsion fracture.                 Assessment / Plan    Assessment/Plan:   Laine Hernandez is a 55 y.o. female status post right thumb CMC arthroplasty with MCP joint fusion, DOS 2/12/2025, with right thumb bony mallet injury and left thumb CMC arthritis     The patient's x-rays demonstrate evidence of fusion to the thumb MCP joint and the dorsal lip distal phalanx fracture appears well aligned.  I would like her to continue full-time IP joint explanting for additional 2 weeks.  She may then wean out as she tolerates.  I would like her to start working with a therapist again in approximately 2 weeks.  I will see her back in 4 weeks for reevaluation of her pain and motion.  All question concerns were addressed.  She is agreeable.      ICD-10-CM ICD-9-CM   1. Mallet finger of right hand  M20.011 736.1   2. Surgery follow-up  Z09 V67.00   3. Chronic instability of first MCP joint, right  M25.341 718.84   4. Arthritis of carpometacarpal (CMC) joint of right thumb  M18.11 716.94     Follow Up:   Return in about 4 weeks (around 6/3/2025).      Dasia Martínez MD  [facility]

## 2025-05-08 ENCOUNTER — TELEPHONE (OUTPATIENT)
Dept: INTERNAL MEDICINE | Facility: CLINIC | Age: 55
End: 2025-05-08
Payer: COMMERCIAL

## 2025-05-08 RX ORDER — PRAVASTATIN SODIUM 10 MG
10 TABLET ORAL DAILY
Qty: 30 TABLET | Refills: 5 | Status: SHIPPED | OUTPATIENT
Start: 2025-05-08

## 2025-05-08 NOTE — TELEPHONE ENCOUNTER
Please call patient to see if she is willing to try a very tiny dose of rosuvastatin and gradually work up?  Most people are able to tolerate it if working up very slowly.  Even a little bit of statin is better than when it comes to preventing heart disease.    Try 1/2 tablet of rosuvastatin once a week for about 3 weeks.  Then if okay, increase to half tablet twice a week for a few weeks, then half tablet 3 times a week for a few weeks, etc.

## 2025-05-08 NOTE — TELEPHONE ENCOUNTER
Pt notified.  She said she has tried a small dose of the crestor and it still did not make her feel good.  She is wanting to know if there are any other options.  Please advise.

## 2025-05-08 NOTE — TELEPHONE ENCOUNTER
Spoke to pt and she verbalized understanding.    She asked if she needs any labs drawn to recheck her levels for her next visit? Or can she just cancel the appt in June?

## 2025-05-16 ENCOUNTER — OFFICE VISIT (OUTPATIENT)
Dept: OBSTETRICS AND GYNECOLOGY | Facility: CLINIC | Age: 55
End: 2025-05-16
Payer: COMMERCIAL

## 2025-05-16 VITALS
BODY MASS INDEX: 30.05 KG/M2 | SYSTOLIC BLOOD PRESSURE: 126 MMHG | WEIGHT: 187 LBS | HEIGHT: 66 IN | DIASTOLIC BLOOD PRESSURE: 78 MMHG

## 2025-05-16 DIAGNOSIS — N94.10 FEMALE DYSPAREUNIA: ICD-10-CM

## 2025-05-16 DIAGNOSIS — Z76.89 ENCOUNTER FOR WEIGHT MANAGEMENT: ICD-10-CM

## 2025-05-16 DIAGNOSIS — N95.2 POST-MENOPAUSAL ATROPHIC VAGINITIS: Chronic | ICD-10-CM

## 2025-05-16 DIAGNOSIS — N95.8 GENITOURINARY SYNDROME OF MENOPAUSE: ICD-10-CM

## 2025-05-16 DIAGNOSIS — Z12.39 ENCOUNTER FOR BREAST CANCER SCREENING USING NON-MAMMOGRAM MODALITY: ICD-10-CM

## 2025-05-16 DIAGNOSIS — N95.1 HOT FLASHES DUE TO MENOPAUSE: ICD-10-CM

## 2025-05-16 DIAGNOSIS — Z78.0 POSTMENOPAUSAL STATUS: ICD-10-CM

## 2025-05-16 DIAGNOSIS — Z01.419 WOMEN'S ANNUAL ROUTINE GYNECOLOGICAL EXAMINATION: Primary | ICD-10-CM

## 2025-05-16 RX ORDER — ESTRADIOL 10 UG/1
1 TABLET, FILM COATED VAGINAL 2 TIMES WEEKLY
Qty: 24 TABLET | Refills: 3 | Status: SHIPPED | OUTPATIENT
Start: 2025-05-19 | End: 2026-05-19

## 2025-05-16 RX ORDER — TIRZEPATIDE 2.5 MG/.5ML
2.5 INJECTION, SOLUTION SUBCUTANEOUS WEEKLY
Qty: 2 ML | Refills: 2 | Status: SHIPPED | OUTPATIENT
Start: 2025-05-16

## 2025-05-16 RX ORDER — PROGESTERONE 100 MG/1
100 CAPSULE ORAL DAILY
Qty: 90 CAPSULE | Refills: 3 | Status: SHIPPED | OUTPATIENT
Start: 2025-05-16

## 2025-05-16 RX ORDER — ESTRADIOL 0.75 MG/1.25G
1.25 GEL, METERED TOPICAL DAILY
Qty: 37.5 G | Refills: 12 | Status: SHIPPED | OUTPATIENT
Start: 2025-05-16 | End: 2026-05-16

## 2025-05-16 NOTE — PROGRESS NOTES
Gynecologic Annual Exam Note        GYN Annual Exam     CC - Here for annual exam.        HPI  Laine Hernandez is a 55 y.o. female, , who presents for annual well woman exam as a established patient.  She has had an ablation.   There were no changes to her medical or surgical history since her last visit. Marital Status: .  She is sexually active. She has not had new partners.. STD testing recommendations have been explained to the patient and she declines STD testing.    The patient would like to discuss the following complaints today: hot flashes, weight gain.    Additional OB/GYN History   On HRT? No    Last Pap : 2024. Results: negative. HPV: negative.   Last Completed Pap Smear            Upcoming       PAP SMEAR (Every 3 Years) Next due on 2024  LIQUID-BASED PAP SMEAR WITH HPV GENOTYPING REGARDLESS OF INTERPRETATION (ROSSANA,COR,MAD)    2019  Done                          History of abnormal Pap smear:   yes - Colposcopy in   Family history of uterine, colon, breast, or ovarian cancer:  yes - Colon Ca- Mother  Performs monthly Self-Breast Exam: yes  Last mammogram: 2024. Done at . There is a copy in the chart.     Last Completed Mammogram            Upcoming       MAMMOGRAM (Every 2 Years) Next due on 2024  Mammo Screening Digital Tomosynthesis Bilateral With CAD                          Last colonoscopy: has had a colonoscopy 2024    Last Completed Colonoscopy            Needs Review       COLORECTAL CANCER SCREENING (COLONOSCOPY - Every 10 Years) Tentatively due on 2024  SCANNED - COLONOSCOPY                            Her last bone density scan was 2024 ago and results were Normal  Exercises Regularly: yes  Feelings of Anxiety or Depression: no      Tobacco Usage?: No       Current Outpatient Medications:     Ascorbic Acid (VITAMIN C PO), Take  by mouth Daily., Disp: , Rfl:      Cholecalciferol (VITAMIN D3 PO), Take 1,000 Units by mouth 2 (Two) Times a Day. Not in summer months, Disp: , Rfl:     coenzyme Q10 100 MG capsule, Take 1 capsule by mouth Daily., Disp: , Rfl:     diclofenac (VOLTAREN) 50 MG EC tablet, TAKE ONE TABLET BY MOUTH TWICE A DAY AS NEEDED FOR PAIN, Disp: 60 tablet, Rfl: 5    [START ON 5/19/2025] estradiol (Vagifem) 10 MCG tablet vaginal tablet, Insert 1 tablet into the vagina 2 (Two) Times a Week., Disp: 24 tablet, Rfl: 3    fexofenadine (ALLEGRA) 180 MG tablet, Take 1 tablet by mouth Daily As Needed (allergies)., Disp: , Rfl:     Lactobacillus (ACIDOPHILUS PO), Take  by mouth Daily., Disp: , Rfl:     magnesium, as, gluconate (MAGONATE) 500 (27 Mg) MG tablet, Take 1 tablet by mouth Daily., Disp: 90 tablet, Rfl: 1    meclizine (ANTIVERT) 12.5 MG tablet, As Needed., Disp: , Rfl:     Menaquinone-7 (Vitamin K2) 100 MCG capsule, Take 1 capsule by mouth Daily., Disp: , Rfl:     minoxidil (LONITEN) 10 MG tablet, Take 0.5 tablets by mouth Daily., Disp: 45 tablet, Rfl: 1    multivitamin with minerals tablet tablet, Take 1 tablet by mouth Daily., Disp: , Rfl:     niacin 500 MG tablet, Take 3 tablets by mouth Daily., Disp: , Rfl:     Phendimetrazine Tartrate 35 MG tablet, Take 1 tablet by mouth 3 (Three) Times a Day., Disp: , Rfl:     pravastatin (PRAVACHOL) 10 MG tablet, Take 1 tablet by mouth Daily., Disp: 30 tablet, Rfl: 5    Progesterone (Prometrium) 100 MG capsule, Take 1 capsule by mouth Daily., Disp: 90 capsule, Rfl: 3    traZODone (DESYREL) 50 MG tablet, TAKE ONE TABLET BY MOUTH ONCE NIGHTLY, Disp: 90 tablet, Rfl: 0    Estradiol (Estrogel) 0.75 MG/1.25 GM (0.06%) topical gel, Place 1.25 g on the skin as directed by provider Daily., Disp: 37.5 g, Rfl: 12    Tirzepatide-Weight Management (Zepbound) 2.5 MG/0.5ML solution, Inject 0.5 mL under the skin into the appropriate area as directed 1 (One) Time Per Week., Disp: 2 mL, Rfl: 2    Patient is requesting refills of Progesterone  and would like it sent to [facility] that is on file.    OB History          0    Para   0    Term   0            AB        Living             SAB        IAB        Ectopic        Molar        Multiple        Live Births                    Past Medical History:   Diagnosis Date    Abnormal Pap smear of cervix     ADHD (attention deficit hyperactivity disorder) 2008    Was on meds for a year    Allergic 2005    The usual pollen allergies, plus sulfa antibiotics and latex    Ankle sprain     Arthritis 2018    Osteo arthritis in hands and hips    Cancer 2019    On face - removed in 2019    Cataract 2019    Cervical dysplasia     Class 1 obesity due to excess calories with serious comorbidity and body mass index (BMI) of 30.0 to 30.9 in adult 2022    Class 1 obesity due to excess calories with serious comorbidity and body mass index (BMI) of 31.0 to 31.9 in adult 2022    Depression     No major depressive episodes since     Diverticulosis     Just one episode, none since    Fracture, foot 2007    GERD (gastroesophageal reflux disease)     Headache     I only get them 3-4x/year now and they last 12ish hours each    Hip arthrosis 2020    HPV (human papilloma virus) infection     Hyperlipidemia     Menorrhalgia     uterine ablation performed    Migraine     I get ~6 migraines/year that last 24-48 hrs    Neuroma of foot     PMS (premenstrual syndrome) 1986    Rotator cuff syndrome     Rotator cuff surgery in 2024    Tendinitis of knee     I tried running for daily exercise. My knees said no.    Tennis elbow     From walking strong dogs    Varicella 1985        Past Surgical History:   Procedure Laterality Date    AUGMENTATION MAMMAPLASTY Bilateral 2002    SALINE    BREAST SURGERY  2002    augmentation     BUNIONECTOMY       and     COLONOSCOPY      ENDOMETRIAL ABLATION  2005    EYE SURGERY      Lasik    FINGER  "SURGERY Right 02/12/2025    1. Right first carpometacarpal joint interposition arthroplasty with APL to FCR suspensionplasty 2. Right thumb MCP joint arthrodesis - Dr Martínez    FRACTURE SURGERY  2014 and 2015    Not fractures but bunionectomies    ROTATOR CUFF REPAIR Left 06/17/2024    Dr.Brent Serrano    SHOULDER SURGERY  2024    Rotator cuff repair    SKIN CANCER EXCISION  2018    R cheek melanoma       Health Maintenance   Topic Date Due    Pneumococcal Vaccine 50+ (1 of 1 - PCV) Never done    HEPATITIS C SCREENING  Never done    COVID-19 Vaccine (5 - 2024-25 season) 09/01/2024    Annual Gynecologic Pelvic and Breast Exam  02/14/2025    INFLUENZA VACCINE  07/01/2025    ANNUAL PHYSICAL  12/10/2025    MAMMOGRAM  02/05/2026    LIPID PANEL  05/06/2026    PAP SMEAR  02/13/2027    TDAP/TD VACCINES (2 - Td or Tdap) 04/26/2029    COLORECTAL CANCER SCREENING  01/24/2034    ZOSTER VACCINE  Completed       The additional following portions of the patient's history were reviewed and updated as appropriate: allergies, current medications, past family history, past medical history, past social history, past surgical history, and problem list.    Review of Systems    I have reviewed and agree with the HPI, ROS, and historical information as entered above. Lisa Urbina MD      Objective   /78   Ht 167.6 cm (65.98\")   Wt 84.8 kg (187 lb)   BMI 30.20 kg/m²     Physical Exam  Vitals and nursing note reviewed. Exam conducted with a chaperone present.   Constitutional:       Appearance: She is well-developed.   HENT:      Head: Normocephalic and atraumatic.   Neck:      Thyroid: No thyroid mass or thyromegaly.   Cardiovascular:      Rate and Rhythm: Normal rate and regular rhythm.      Heart sounds: No murmur heard.  Pulmonary:      Effort: Pulmonary effort is normal. No retractions.      Breath sounds: Normal breath sounds. No wheezing, rhonchi or rales.   Chest:      Chest wall: No mass or tenderness.   Breasts:     " Right: Normal. No mass, nipple discharge, skin change or tenderness.      Left: Normal. No mass, nipple discharge, skin change or tenderness.   Abdominal:      General: Bowel sounds are normal.      Palpations: Abdomen is soft. Abdomen is not rigid. There is no mass.      Tenderness: There is no abdominal tenderness. There is no guarding.      Hernia: No hernia is present. There is no hernia in the left inguinal area.   Genitourinary:     Labia:         Right: No rash, tenderness or lesion.         Left: No rash, tenderness or lesion.       Vagina: Normal. No vaginal discharge or lesions.      Cervix: No cervical motion tenderness, discharge, lesion or cervical bleeding.      Uterus: Normal. Not enlarged, not fixed and not tender.       Adnexa:         Right: No mass or tenderness.          Left: No mass or tenderness.        Rectum: No external hemorrhoid.   Musculoskeletal:      Cervical back: Normal range of motion. No muscular tenderness.   Neurological:      Mental Status: She is alert and oriented to person, place, and time.   Psychiatric:         Behavior: Behavior normal.            Assessment and Plan    Problem List Items Addressed This Visit          Genitourinary and Reproductive     Post-menopausal atrophic vaginitis (Chronic)    Overview   Continue estradiol patch weekly and progesterone tablet every evening.    She will try Replens vaginal moisturizer.  We also discussed the possibility of compounding a coconut oil vaginal cream.         Relevant Medications    estradiol (Vagifem) 10 MCG tablet vaginal tablet (Start on 5/19/2025)     Other Visit Diagnoses         Women's annual routine gynecological examination    -  Primary      Encounter for breast cancer screening using non-mammogram modality          Postmenopausal status          Genitourinary syndrome of menopause        Relevant Medications    estradiol (Vagifem) 10 MCG tablet vaginal tablet (Start on 5/19/2025)      Female dyspareunia         Relevant Medications    estradiol (Vagifem) 10 MCG tablet vaginal tablet (Start on 5/19/2025)      Encounter for weight management          Hot flashes due to menopause                GYN annual well woman exam.   Recommended use of Vitamin D replacement and getting adequate calcium in her diet. (1500mg)  Continue yearly mammography.  Reviewed self breast awareness.  Instructed to call with lumps, pain, or breast discharge.     Reviewed HPV guidelines.  Reviewed exercise as a preventative health measures.    Menopausal sx- will try to see another form of transdermal estrogen affordable through [facility].  [employer] info on GSM given.  Reviewed risks/benefits of HRT including increased risk of breast cancer and VTE but less with transdermal estrogen.  There is evidence HRT decreases hip fracture, cardiovascular disease, colon cancer and all cause mortality.  Patient strongly desires to stay on or start HRT.  She understands she will use the lowest dose that adequately controls her symptoms.    Obesity - she qualifies for treatment with GLP 1.  Will send to [facility].   'Return in about 3 months (around 8/16/2025).         Lisa Urbina MD  05/16/2025

## 2025-05-23 ENCOUNTER — TELEPHONE (OUTPATIENT)
Dept: OBSTETRICS AND GYNECOLOGY | Facility: CLINIC | Age: 55
End: 2025-05-23
Payer: COMMERCIAL

## 2025-05-23 NOTE — TELEPHONE ENCOUNTER
Spoke with patient regarding her patient message about estrogel and she stated [facility] was able to get ahold of [facility].       I spoke with Vini at the [facility] pharmacy and he stated they just received the fax transfer and the medication will be here on Tuesday. Patient was notified.

## 2025-06-03 ENCOUNTER — OFFICE VISIT (OUTPATIENT)
Age: 55
End: 2025-06-03
Payer: COMMERCIAL

## 2025-06-03 VITALS
SYSTOLIC BLOOD PRESSURE: 99 MMHG | DIASTOLIC BLOOD PRESSURE: 78 MMHG | WEIGHT: 182.5 LBS | BODY MASS INDEX: 29.33 KG/M2 | HEIGHT: 66 IN

## 2025-06-03 DIAGNOSIS — M25.341: ICD-10-CM

## 2025-06-03 DIAGNOSIS — M79.641 HAND PAIN, RIGHT: ICD-10-CM

## 2025-06-03 DIAGNOSIS — M18.0 ARTHRITIS OF CARPOMETACARPAL (CMC) JOINT OF BOTH THUMBS: Primary | ICD-10-CM

## 2025-06-03 NOTE — PROGRESS NOTES
"                                                                 [facility]     Post Operative Office Visit      Date: 06/03/2025   Patient Name: Laine Hernandez  MRN: [id number]  YOB: 1970    Chief Complaint:   Chief Complaint   Patient presents with    Follow-up     4 week follow-up; status post Right first carpometacarpal joint interposition arthroplasty with APL to FCR suspensionplasty and Right thumb MCP joint arthrodesis - DOS 2/12/25     History of Present Illness:   Laine Hernandez is a 55 y.o. female status post right thumb CMC arthroplasty with MCP joint fusion, DOS 2/12/2025, with right thumb bony mallet injury and left thumb CMC arthritis.  At the patient's last clinic visit we continued full-time IP joint splinting for additional 2 weeks and then begin a weaning protocol.  I instructed her to restart therapy at that time.  She reports that she has not been wearing the immobilization but does report that she has had a couple falls recently injuring her right hand.  She has not restarted therapy but is been working at home with a home directed program.  No other complaints or concerns.    Subjective   Review of Systems:   Review of Systems     I reviewed the patient's chief complaint, history of present illness, review of systems, past medical history, surgical history, family history, social history, medications and allergy list in the EMR on 06/03/2025 and agree with the findings above.    Objective    Vital Signs:   Vitals:    06/03/25 0933   BP: 99/78   Weight: 82.8 kg (182 lb 8 oz)   Height: 167.6 cm (65.98\")       General Appearance: No acute distress. Alert and oriented.     Ortho Exam:  Examination of the right Upper Extremity:   Skin examination: Surgical incisions over the dorsal aspect of the thumb MCP joint and volar CMC joint.  These are nontender palpation.  She is nontender to the dorsal aspect of the thumb IP joint.  There is stiffness to the thumb IP " joint both actively and passively.  Able to make a composite fist and oppose the thumb to the midportion of the small finger.  The thumb is stable to axial load.  AIN/PIN/Radial/Ulnar nerves grossly motor intact  Median/radial/ulnar sensory intact to light touch   Palpable radial pulse, digits are warm and well-perfused       Imaging / Studies:    Imaging Results (Last 24 Hours)       Procedure Component Value Units Date/Time    XR Hand 3+ View Right [[id number]] Resulted: 06/03/25 0959     Updated: 06/03/25 0959    Narrative:      Right Hand X-Ray    Indication: Pain    Views:  PA, Lateral, and Oblique     Comparison:  56/2025    Findings:  Post trapeziectomy with thumb MCP joint fusion.  Hardware is well aligned   without interval change.  No significant metacarpal subsidence.  The thumb   distal phalanx fracture is not well-visualized on the views today, however   does appear to be healing.               Assessment / Plan    Assessment/Plan:   Laine Hernandez is a 55 y.o. female status post  right thumb CMC arthroplasty with MCP joint fusion, DOS 2/12/2025, with right thumb bony mallet injury and left thumb CMC arthritis     Patient has done well since surgery and since her bony mallet finger injury.  Will discontinue all splint wear and she may return to all activities without restriction.  We discussed a home exercise program working on thumb IP joint motion versus hand therapy.  She like to work on this at home, which I feel is reasonable.  Additionally she would like to continue with left thumb CMC injections.  Her last injection was 6 weeks ago.  Therefore I will see her back in 6 weeks for reevaluation of bilateral thumbs.  All questions and concerns were addressed.  She is agreeable.      ICD-10-CM ICD-9-CM   1. Arthritis of carpometacarpal (CMC) joint of both thumbs  M18.0 716.94   2. Chronic instability of first MCP joint, right  M25.341 718.84   3. Hand pain, right  M79.641 729.5       Follow Up:    Return in about 6 weeks (around 7/15/2025) for Follow Up.      Dasia Martínez MD  [facility]

## 2025-06-06 RX ORDER — MINOXIDIL 10 MG/1
TABLET ORAL
Qty: 45 TABLET | Refills: 1 | Status: SHIPPED | OUTPATIENT
Start: 2025-06-06

## 2025-06-06 NOTE — TELEPHONE ENCOUNTER
Rx Refill Note  Requested Prescriptions     Pending Prescriptions Disp Refills    minoxidil (LONITEN) 10 MG tablet [Pharmacy Med Name: MINOXIDIL 10 MG TABLET] 45 tablet 1     Sig: TAKE A HALF TABLET BY MOUTH DAILY      Last office visit with prescribing clinician: 12/10/2024   Last telemedicine visit with prescribing clinician: Visit date not found   Next office visit with prescribing clinician: 6/17/2025                         Would you like a call back once the refill request has been completed: [] Yes [] No    If the office needs to give you a call back, can they leave a voicemail: [] Yes [] No    Azul Short MA  06/06/25, 08:32 EDT

## 2025-06-17 ENCOUNTER — LAB (OUTPATIENT)
Dept: LAB | Facility: HOSPITAL | Age: 55
End: 2025-06-17
Payer: COMMERCIAL

## 2025-06-17 ENCOUNTER — OFFICE VISIT (OUTPATIENT)
Dept: INTERNAL MEDICINE | Facility: CLINIC | Age: 55
End: 2025-06-17
Payer: COMMERCIAL

## 2025-06-17 ENCOUNTER — TELEPHONE (OUTPATIENT)
Dept: FAMILY MEDICINE CLINIC | Facility: CLINIC | Age: 55
End: 2025-06-17
Payer: COMMERCIAL

## 2025-06-17 VITALS
SYSTOLIC BLOOD PRESSURE: 100 MMHG | WEIGHT: 180.4 LBS | HEIGHT: 66 IN | TEMPERATURE: 97.8 F | OXYGEN SATURATION: 99 % | HEART RATE: 80 BPM | BODY MASS INDEX: 28.99 KG/M2 | DIASTOLIC BLOOD PRESSURE: 76 MMHG

## 2025-06-17 DIAGNOSIS — R35.0 URINARY FREQUENCY: ICD-10-CM

## 2025-06-17 DIAGNOSIS — F51.01 PRIMARY INSOMNIA: ICD-10-CM

## 2025-06-17 DIAGNOSIS — E78.2 MIXED HYPERLIPIDEMIA: Primary | Chronic | ICD-10-CM

## 2025-06-17 DIAGNOSIS — E66.3 OVERWEIGHT WITH BODY MASS INDEX (BMI) OF 29 TO 29.9 IN ADULT: Chronic | ICD-10-CM

## 2025-06-17 DIAGNOSIS — R73.09 ABNORMAL GLUCOSE: ICD-10-CM

## 2025-06-17 DIAGNOSIS — M76.62 ACHILLES TENDINITIS OF LEFT LOWER EXTREMITY: ICD-10-CM

## 2025-06-17 DIAGNOSIS — R11.0 CHRONIC NAUSEA: Chronic | ICD-10-CM

## 2025-06-17 DIAGNOSIS — M21.70 LEG LENGTH DISCREPANCY: ICD-10-CM

## 2025-06-17 DIAGNOSIS — N95.2 POST-MENOPAUSAL ATROPHIC VAGINITIS: Chronic | ICD-10-CM

## 2025-06-17 DIAGNOSIS — R00.2 PALPITATIONS: Chronic | ICD-10-CM

## 2025-06-17 DIAGNOSIS — K90.41 NCGS (NON-CELIAC GLUTEN SENSITIVITY): ICD-10-CM

## 2025-06-17 PROBLEM — R41.840 ATTENTION OR CONCENTRATION DEFICIT: Chronic | Status: ACTIVE | Noted: 2025-06-17

## 2025-06-17 LAB
BACTERIA UR QL AUTO: NORMAL /HPF
BILIRUB UR QL STRIP: NEGATIVE
CLARITY UR: CLEAR
COLOR UR: ABNORMAL
GLUCOSE UR STRIP-MCNC: NEGATIVE MG/DL
HGB UR QL STRIP.AUTO: NEGATIVE
HYALINE CASTS UR QL AUTO: NORMAL /LPF
KETONES UR QL STRIP: ABNORMAL
LEUKOCYTE ESTERASE UR QL STRIP.AUTO: ABNORMAL
NITRITE UR QL STRIP: NEGATIVE
PH UR STRIP.AUTO: 6 [PH] (ref 5–8)
PROT UR QL STRIP: NEGATIVE
RBC # UR STRIP: NORMAL /HPF
REF LAB TEST METHOD: NORMAL
SP GR UR STRIP: 1.02 (ref 1–1.03)
SQUAMOUS #/AREA URNS HPF: NORMAL /HPF
UROBILINOGEN UR QL STRIP: ABNORMAL
WBC # UR STRIP: NORMAL /HPF

## 2025-06-17 PROCEDURE — 99214 OFFICE O/P EST MOD 30 MIN: CPT | Performed by: INTERNAL MEDICINE

## 2025-06-17 PROCEDURE — 81001 URINALYSIS AUTO W/SCOPE: CPT

## 2025-06-17 RX ORDER — ONDANSETRON 4 MG/1
4 TABLET, ORALLY DISINTEGRATING ORAL EVERY 8 HOURS PRN
Qty: 15 TABLET | Refills: 3 | Status: SHIPPED | OUTPATIENT
Start: 2025-06-17

## 2025-06-17 RX ORDER — NITROFURANTOIN 25; 75 MG/1; MG/1
100 CAPSULE ORAL 2 TIMES DAILY
Qty: 6 CAPSULE | Refills: 0 | Status: SHIPPED | OUTPATIENT
Start: 2025-06-17 | End: 2025-06-20

## 2025-06-17 RX ORDER — TRAZODONE HYDROCHLORIDE 50 MG/1
50 TABLET ORAL NIGHTLY
Qty: 90 TABLET | Refills: 0 | Status: SHIPPED | OUTPATIENT
Start: 2025-06-17

## 2025-06-17 NOTE — PROGRESS NOTES
[facility]     Laine Hernandez  1970   [id number]      Patient Care Team:  Carina Chan MD as PCP - General (Internal Medicine)  Franky Mckeon MD as Consulting Physician (Cardiology)  Simba Serrano MD as Consulting Physician (Orthopedic Surgery)  Georgia Rasmussen PA-C as Physician Assistant (Physician Assistant)  Dasia Martínez MD as Consulting Physician (Orthopedic Surgery)    Chief Complaint   Patient presents with    6 mo f/u    Hyperlipidemia        Patient is a 55 y.o. female.   History of Present Illness  The patient is here for a 6-month follow-up visit.    She reports persistent urinary frequency, necessitating hourly bathroom visits. There is no associated pain or itching. Her urine is typically clear, except for a bright yellow color in the morning, which she attributes to her high water intake. She has not observed any orange or bloody discoloration in her urine. She is uncertain if these symptoms are related to menopause or aging. She recently underwent a gynecological examination, which revealed no abnormalities apart from vaginal dryness. She was previously treated with Macrobid for a suspected urinary tract infection, which alleviated her symptoms.    She experiences nausea and fatigue approximately 24 hours after each Zepbound injection, a medication she started a month ago. These symptoms persisted intermittently for a month until the previous Friday, but have since resolved. She has been experiencing intermittent severe nausea for the past 4 years, for which she was prescribed ondansetron. She reports no side effects from this medication and is requesting a refill.    She has been using topical estrogen gel for vaginal dryness for the past year, which has significantly improved her symptoms. She also uses Replens externally and Vagifem twice weekly. She and her  were able to resume sexual activity last month after a 3-year hiatus.    She  has increased her physical activity, including nightly walks and gardening. She recently injured her left Achilles tendon, which is improving with the use of a brace and stretching exercises. She performs stretching exercises every morning before getting out of bed, which helps alleviate her plantar fasciitis symptoms. She also uses red light therapy on her Achilles tendon at night.    She discontinued phendimetrazine abruptly, which led to a near-major depressive episode. She resumed the medication and gradually weaned herself off it. She reports no current depressive symptoms. She takes trazodone once weekly for sleep disturbances.    She has noticed an increase in heart palpitations since discontinuing phendimetrazine and starting Zepbound. She consumes one cup of coffee daily and has reduced her sugar intake. Her palpitations occur at rest.    She has been using minoxidil, which has caused her hair to grow in wavy. She has more gray hair now.    She has eliminated gluten from her diet, which has reduced her arm itching and bloating. She does not believe she has celiac disease. She occasionally experiences heartburn and indigestion, for which she takes Pepcid once weekly. She believes gluten may be contributing to these symptoms.    She has been diagnosed with ADHD, for which she was treated 15 years ago. Phendimetrazine has been beneficial in managing her ADHD symptoms, but she does not wish to add another medication to her regimen at this time.    PAST SURGICAL HISTORY:  She had an operation on her hand in 02/2025. She fell and broke her right joint after this operation.         CHRONIC CONDITIONS      Past Medical History:   Diagnosis Date    Abnormal Pap smear of cervix 2003    ADHD (attention deficit hyperactivity disorder) 2008    Was on meds for a year    Allergic 2005    The usual pollen allergies, plus sulfa antibiotics and latex    Ankle sprain     Arthritis 2018    Osteo arthritis in hands and hips     Cancer 2019    On face - removed in 2019    Cataract 2019    Cervical dysplasia 2003    Class 1 obesity due to excess calories with serious comorbidity and body mass index (BMI) of 30.0 to 30.9 in adult 06/21/2022    Class 1 obesity due to excess calories with serious comorbidity and body mass index (BMI) of 31.0 to 31.9 in adult 06/21/2022    Depression 2006    No major depressive episodes since 2009    Diverticulosis 2011    Just one episode, none since    Fracture, foot 2007    GERD (gastroesophageal reflux disease) 1999    Headache 2009    I only get them 3-4x/year now and they last 12ish hours each    Hip arthrosis July 2020    HPV (human papilloma virus) infection 2003    Hyperlipidemia 2023    Menorrhalgia 2012    uterine ablation performed    Migraine 2009    I get ~6 migraines/year that last 24-48 hrs    Neuroma of foot 2013    PMS (premenstrual syndrome) 1986    Rotator cuff syndrome 2012    Rotator cuff surgery in June 2024    Tendinitis of knee 1996    I tried running for daily exercise. My knees said no.    Tennis elbow 2011    From walking strong dogs    Varicella 1985       Past Surgical History:   Procedure Laterality Date    AUGMENTATION MAMMAPLASTY Bilateral 2002    SALINE    BREAST SURGERY  2002    augmentation     BUNIONECTOMY      2014 and 2015    COLONOSCOPY  2024    ENDOMETRIAL ABLATION  2005    EYE SURGERY  2000    Lasik    FINGER SURGERY Right 02/12/2025    1. Right first carpometacarpal joint interposition arthroplasty with APL to FCR suspensionplasty 2. Right thumb MCP joint arthrodesis - Dr Martínez    FRACTURE SURGERY  2014 and 2015    Not fractures but bunionectomies    ROTATOR CUFF REPAIR Left 06/17/2024    Dr.Brent Serrano    SHOULDER SURGERY  2024    Rotator cuff repair    SKIN CANCER EXCISION  2018    R cheek melanoma       Family History   Problem Relation Age of Onset    Arthritis Father     Diabetes Father         oral med    Hyperlipidemia Father     Hypertension Father      "Stroke Father 76    Obesity Father     Meniere's disease Father     Heart attack Father     Alcohol abuse Father     Hearing loss Father     Heart disease Father     Arthritis Mother     Colon cancer Mother 81    Heart attack Mother 84        cause of death    COPD Mother     Alcohol abuse Mother     Anxiety disorder Mother     Heart disease Mother     Miscarriages / Stillbirths Mother         Stillbirth    Cancer Mother         Colon Cancer    Sleep apnea Brother     Hyperlipidemia Sister     Meniere's disease Sister     Hearing loss Sister     Miscarriages / Stillbirths Sister         Stillbirth    Diabetes Paternal Grandfather     Breast cancer Neg Hx     Ovarian cancer Neg Hx     Uterine cancer Neg Hx        Social History     Socioeconomic History    Marital status:    Tobacco Use    Smoking status: Never     Passive exposure: Past    Smokeless tobacco: Never    Tobacco comments:     Both parents smoked when I was growing up   Vaping Use    Vaping status: Never Used   Substance and Sexual Activity    Alcohol use: Not Currently     Alcohol/week: 3.0 standard drinks of alcohol     Types: 1 Glasses of wine, 2 Drinks containing 0.5 oz of alcohol per week     Comment: 1 aweek    Drug use: Never    Sexual activity: Yes     Partners: Male     Birth control/protection: Vasectomy     Comment: My vaginal atrophy is so bad, intercourse is not possible.       Allergies   Allergen Reactions    Latex Itching    Sulfa Antibiotics Other (See Comments)     Per pt - swollen lymph nodes       Review of Systems:     Review of Systems    Vital Signs  Vitals:    06/17/25 1131   BP: 100/76   BP Location: Left arm   Patient Position: Sitting   Cuff Size: Adult   Pulse: 80   Temp: 97.8 °F (36.6 °C)   TempSrc: Infrared   SpO2: 99%   Weight: 81.8 kg (180 lb 6.4 oz)   Height: 167.6 cm (65.98\")   PainSc: 0-No pain     Body mass index is 29.13 kg/m².  BMI is >= 25 and <30. (Overweight) The following options were offered after " discussion;: exercise counseling/recommendations, nutrition counseling/recommendations, and Information on healthy weight added to patient's after visit summary.          Current Outpatient Medications:     Ascorbic Acid (VITAMIN C PO), Take  by mouth Daily., Disp: , Rfl:     Cholecalciferol (VITAMIN D3 PO), Take 1,000 Units by mouth 2 (Two) Times a Day. Not in summer months, Disp: , Rfl:     coenzyme Q10 100 MG capsule, Take 1 capsule by mouth Daily., Disp: , Rfl:     diclofenac (VOLTAREN) 50 MG EC tablet, TAKE ONE TABLET BY MOUTH TWICE A DAY AS NEEDED FOR PAIN, Disp: 60 tablet, Rfl: 5    Estradiol (Estrogel) 0.75 MG/1.25 GM (0.06%) topical gel, Place 1.25 g on the skin as directed by provider Daily., Disp: 37.5 g, Rfl: 12    estradiol (Vagifem) 10 MCG tablet vaginal tablet, Insert 1 tablet into the vagina 2 (Two) Times a Week., Disp: 24 tablet, Rfl: 3    fexofenadine (ALLEGRA) 180 MG tablet, Take 1 tablet by mouth Daily As Needed (allergies)., Disp: , Rfl:     Lactobacillus (ACIDOPHILUS PO), Take  by mouth Daily., Disp: , Rfl:     magnesium, as, gluconate (MAGONATE) 500 (27 Mg) MG tablet, Take 1 tablet by mouth Daily., Disp: 90 tablet, Rfl: 1    meclizine (ANTIVERT) 12.5 MG tablet, As Needed., Disp: , Rfl:     Menaquinone-7 (Vitamin K2) 100 MCG capsule, Take 1 capsule by mouth Daily., Disp: , Rfl:     minoxidil (LONITEN) 10 MG tablet, TAKE A HALF TABLET BY MOUTH DAILY, Disp: 45 tablet, Rfl: 1    multivitamin with minerals tablet tablet, Take 1 tablet by mouth Daily., Disp: , Rfl:     niacin 500 MG tablet, Take 3 tablets by mouth Daily., Disp: , Rfl:     pravastatin (PRAVACHOL) 10 MG tablet, Take 1 tablet by mouth Daily., Disp: 30 tablet, Rfl: 5    Progesterone (Prometrium) 100 MG capsule, Take 1 capsule by mouth Daily., Disp: 90 capsule, Rfl: 3    Tirzepatide-Weight Management (Zepbound) 2.5 MG/0.5ML solution, Inject 0.5 mL under the skin into the appropriate area as directed 1 (One) Time Per Week., Disp: 2 mL,  Rfl: 2    traZODone (DESYREL) 50 MG tablet, TAKE 1 TABLET BY MOUTH ONCE NIGHTLY, Disp: 90 tablet, Rfl: 0    nitrofurantoin, macrocrystal-monohydrate, (Macrobid) 100 MG capsule, Take 1 capsule by mouth 2 (Two) Times a Day for 3 days., Disp: 6 capsule, Rfl: 0    ondansetron ODT (ZOFRAN-ODT) 4 MG disintegrating tablet, Place 1 tablet on the tongue Every 8 (Eight) Hours As Needed for Nausea or Vomiting., Disp: 15 tablet, Rfl: 3    Physical Exam:    Physical Exam  Vitals and nursing note reviewed.   Constitutional:       Appearance: She is well-developed and overweight.   HENT:      Head: Normocephalic.   Eyes:      Conjunctiva/sclera: Conjunctivae normal.      Pupils: Pupils are equal, round, and reactive to light.   Neck:      Thyroid: No thyromegaly.   Cardiovascular:      Rate and Rhythm: Normal rate and regular rhythm.      Heart sounds: Normal heart sounds.   Pulmonary:      Effort: Pulmonary effort is normal.      Breath sounds: Normal breath sounds. No wheezing.   Musculoskeletal:         General: Normal range of motion.      Cervical back: Normal range of motion and neck supple.      Right lower leg: No edema.      Left lower leg: No edema.   Lymphadenopathy:      Cervical: No cervical adenopathy.   Skin:     General: Skin is warm and dry.   Neurological:      Mental Status: She is alert and oriented to person, place, and time.   Psychiatric:         Attention and Perception: Attention normal.         Mood and Affect: Mood normal.         Thought Content: Thought content normal.          ACE III MINI        Results Review:    I reviewed the patient's new clinical results.  Results  Labs   - LDL cholesterol: 12, 139 mg/dL       CMP:  Lab Results   Component Value Date    Glucose 101 (H) 05/06/2025    Glucose, UA Negative 04/16/2025    Glucose, UA Negative 12/06/2024    BUN 15 05/06/2025    BUN/Creatinine Ratio 20.0 05/06/2025    Creatinine 0.75 05/06/2025    Creatinine, Urine 98.3 12/06/2024    Ketones, UA  Negative 04/16/2025    Ketones, UA Negative 12/06/2024    CO2 27.1 05/06/2025    Calcium 9.9 05/06/2025    Albumin 4.8 05/06/2025    AST (SGOT) 20 05/06/2025    ALT (SGPT) 15 05/06/2025     HbA1c:  Lab Results   Component Value Date    HGBA1C 5.20 12/06/2024    HGBA1C 5.70 (H) 12/06/2023     Microalbumin:  Lab Results   Component Value Date    MICROALBUR <1.2 12/06/2024     Lipid Panel  Lab Results   Component Value Date    CHOL 228 (H) 05/06/2025    TRIG 116 05/06/2025    HDL 69 (H) 05/06/2025     (H) 05/06/2025    AST 20 05/06/2025    ALT 15 05/06/2025       Medication Review: Medications reviewed and noted  Patient Instructions   Problem List Items Addressed This Visit          Cardiac and Vasculature    Mixed hyperlipidemia - Primary (Chronic)    Relevant Medications    pravastatin (PRAVACHOL) 10 MG tablet    Palpitations (Chronic)       Endocrine and Metabolic    Abnormal glucose       Gastrointestinal Abdominal     Chronic nausea (Chronic)       Genitourinary and Reproductive     Urinary frequency    Relevant Orders    Urinalysis With Microscopic - Urine, Clean Catch       Musculoskeletal and Injuries    Achilles tendinitis of left lower extremity    Leg length discrepancy       Other    Overweight with body mass index (BMI) of 29 to 29.9 in adult (Chronic)          Diagnosis Plan   1. Mixed hyperlipidemia        2. Urinary frequency  Urinalysis With Microscopic - Urine, Clean Catch      3. Achilles tendinitis of left lower extremity        4. Abnormal glucose        5. Chronic nausea        6. Palpitations        7. Overweight with body mass index (BMI) of 29 to 29.9 in adult        8. Leg length discrepancy          Assessment & Plan  Mixed hyperlipidemia  Continue pravastatin nightly.  Continue to improve low-fat healthy diet and increase regular exercise.    Urinary frequency.  - Persistent urinary frequency since last UTI treatment with Macrobid in April; no burning or pain, but urgency and  frequent urination.  Looking back, the urinalysis was actually normal then.  Culture not done.  - Urinalysis to be conducted today to rule out UTI.  - Prescription for Macrobid 100 mg for 3 days sent to pharmacy since she is going on a trip this week; follow-up with gynecologist in 2 months as planned.    Postmenopausal atrophic vaginitis.  Vaginal dryness.  - Significant improvement in symptoms with vaginal estrogen gel and Replens externally for the past year.  - Continued use of prescribed treatments.    Achilles tendonitis on the left.  - Managing with stretches, brace, and red light therapy; positive  improvement noted.  - Notify clinic if symptoms do not continue to improve    Abnormal glucose  Increasing exercise and losing more weight will help decrease the blood sugar and decrease progression to diabetes.    Nausea.  - Nausea 24 hours after taking Zepbound and intermittently for the past month.  - No side effects from ondansetron.  - Prescription for ondansetron 4 mg sent to pharmacy.  - Monitoring for any changes in symptoms.    Heart palpitations.  -Heart palpitations occasionally; occur only at rest and likely benign.  - Monitoring symptoms and reducing caffeine and chocolate intake.    Gluten sensitivity.  - Reduced bloating and itching after eliminating gluten from diet.  Also possibly less joint pains.  - Continue gluten-free diet.    Overweight with BMI of 29.  She has lost 7 pounds over the last month since starting tirzepatide weekly injections.  - Experienced depressive symptoms after abruptly stopping phendimetrazine; resumed small dose and weaned off gradually.  - No current depressive symptoms.  -Continue to increase walking and physical exercise.  Try to get 30 minutes a day of aerobic exercise at least 5 days a week.  - Continue to decrease fats and carbohydrates and sugars in the diet.  Eat plenty of vegetables and drink plenty of water.    ADD.  - Phendimetrazine helped ADHD symptoms; she  has been on medication for ADD for many years in the past.  She does not want to add another medication at this time.    Follow-up  The patient will follow up in 12/2025 for a physical examination.         Plan of care reviewed with patient at the conclusion of today's visit. Education was provided regarding diagnosis, management, and any prescribed or recommended OTC medications. Patient verbalizes understanding of and agreement with management plan.         06/17/25   11:33 EDT    Patient or patient representative verbalized consent for the use of Ambient Listening during the visit with  Carina Chan MD for chart documentation. 6/17/2025  13:10 EDT

## 2025-06-17 NOTE — PATIENT INSTRUCTIONS
Problem List Items Addressed This Visit          Cardiac and Vasculature    Mixed hyperlipidemia - Primary (Chronic)    Relevant Medications    pravastatin (PRAVACHOL) 10 MG tablet    Palpitations (Chronic)       Endocrine and Metabolic    Abnormal glucose       Gastrointestinal Abdominal     Chronic nausea (Chronic)    NCGS (non-celiac gluten sensitivity)       Genitourinary and Reproductive     Post-menopausal atrophic vaginitis (Chronic)    Overview   Continue estradiol patch weekly and progesterone tablet every evening.    She will try Replens vaginal moisturizer.  We also discussed the possibility of compounding a coconut oil vaginal cream.         Relevant Medications    estradiol (Vagifem) 10 MCG tablet vaginal tablet    Urinary frequency    Relevant Orders    Urinalysis With Microscopic - Urine, Clean Catch       Musculoskeletal and Injuries    Achilles tendinitis of left lower extremity    Leg length discrepancy       Other    Overweight with body mass index (BMI) of 29 to 29.9 in adult (Chronic)     Exercising to Stay Healthy  To become healthy and stay healthy, it is recommended that you do moderate-intensity and vigorous-intensity exercise. You can tell that you are exercising at a moderate intensity if your heart starts beating faster and you start breathing faster but can still hold a conversation. You can tell that you are exercising at a vigorous intensity if you are breathing much harder and faster and cannot hold a conversation while exercising.  How can exercise benefit me?  Exercising regularly is important. It has many health benefits, such as:  Improving overall fitness, flexibility, and endurance.  Increasing bone density.  Helping with weight control.  Decreasing body fat.  Increasing muscle strength and endurance.  Reducing stress and tension, anxiety, depression, or anger.  Improving overall health.  What guidelines should I follow while exercising?  Before you start a new exercise program,  talk with your health care provider.  Do not exercise so much that you hurt yourself, feel dizzy, or get very short of breath.  Wear comfortable clothes and wear shoes with good support.  Drink plenty of water while you exercise to prevent dehydration or heat stroke.  Work out until your breathing and your heartbeat get faster (moderate intensity).  How often should I exercise?  Choose an activity that you enjoy, and set realistic goals. Your health care provider can help you make an activity plan that is individually designed and works best for you.  Exercise regularly as told by your health care provider. This may include:  Doing strength training two times a week, such as:  Lifting weights.  Using resistance bands.  Push-ups.  Sit-ups.  Yoga.  Doing a certain intensity of exercise for a given amount of time. Choose from these options:  A total of 150 minutes of moderate-intensity exercise every week.  A total of 75 minutes of vigorous-intensity exercise every week.  A mix of moderate-intensity and vigorous-intensity exercise every week.  Children, pregnant women, people who have not exercised regularly, people who are overweight, and older adults may need to talk with a health care provider about what activities are safe to perform. If you have a medical condition, be sure to talk with your health care provider before you start a new exercise program.  What are some exercise ideas?  Moderate-intensity exercise ideas include:  Walking 1 mile (1.6 km) in about 15 minutes.  Biking.  Hiking.  Golfing.  Dancing.  Water aerobics.  Vigorous-intensity exercise ideas include:  Walking 4.5 miles (7.2 km) or more in about 1 hour.  Jogging or running 5 miles (8 km) in about 1 hour.  Biking 10 miles (16.1 km) or more in about 1 hour.  Lap swimming.  Roller-skating or in-line skating.  Cross-country skiing.  Vigorous competitive sports, such as football, basketball, and soccer.  Jumping rope.  Aerobic dancing.  What are some  everyday activities that can help me get exercise?  Yard work, such as:  Pushing a .  Raking and bagging leaves.  Washing your car.  Pushing a stroller.  Shoveling snow.  Gardening.  Washing windows or floors.  How can I be more active in my day-to-day activities?  Use stairs instead of an elevator.  Take a walk during your lunch break.  If you drive, park your car farther away from your work or school.  If you take public transportation, get off one stop early and walk the rest of the way.  Stand up or walk around during all of your indoor phone calls.  Get up, stretch, and walk around every 30 minutes throughout the day.  Enjoy exercise with a friend. Support to continue exercising will help you keep a regular routine of activity.  Where to find more information  You can find more information about exercising to stay healthy from:  [place] Department of Health and Human Services: www.hhs.gov  Centers for Disease Control and Prevention (CDC): www.cdc.gov  Summary  Exercising regularly is important. It will improve your overall fitness, flexibility, and endurance.  Regular exercise will also improve your overall health. It can help you control your weight, reduce stress, and improve your bone density.  Do not exercise so much that you hurt yourself, feel dizzy, or get very short of breath.  Before you start a new exercise program, talk with your health care provider.  This information is not intended to replace advice given to you by your health care provider. Make sure you discuss any questions you have with your health care provider.  Document Revised: 04/15/2022 Document Reviewed: 04/15/2022  Elsevier Patient Education © 2023 [employer] BMI for Adults  Body mass index (BMI) is a number found using a person's weight and height. BMI can help tell how much of a person's weight is made up of fat. BMI does not measure body fat directly. It is used instead of tests that directly measure body fat, which can be  "difficult and expensive.  What are BMI measurements used for?  BMI is useful to:  Find out if your weight puts you at higher risk for medical problems.  Help recommend changes, such as in diet and exercise. This can help you reach a healthy weight. BMI screening can be done again to see if these changes are working.  How is BMI calculated?  Your height and weight are measured. The BMI is found from those numbers. This can be done with [place] or metric measurements. Note that charts and online BMI calculators are available to help you find your BMI quickly and easily without doing these calculations.  To calculate your BMI in [place] measurements:  Measure your weight in pounds (lb).  Multiply the number of pounds by 703.  So, for an adult who weighs 150 lb, multiply that number by 703: 150 x 703, which equals 105,450.  Measure your height in inches. Then multiply that number by itself to get a measurement called \"inches squared.\"  So, for an adult who is 70 inches tall, the \"inches squared\" measurement is 70 inches x 70 inches, which equals 4,900 inches squared.  Divide the total from step 2 (number of lb x 703) by the total from step 3 (inches squared): 105,450 ÷ 4,900 = 21.5. This is your BMI.  To calculate your BMI in metric measurements:    Measure your weight in kilograms (kg).  For this example, the weight is 70 kg.  Measure your height in meters (m). Then multiply that number by itself to get a measurement called \"meters squared.\"  So, for an adult who is 1.75 m tall, the \"meters squared\" measurement is 1.75 m x 1.75 m, which equals 3.1 meters squared.  Divide the number of kilograms (your weight) by the meters squared number. In this example: 70 ÷ 3.1 = 22.6. This is your BMI.  What do the results mean?  BMI charts are used to see if you are underweight, normal weight, overweight, or obese. The following guidelines will be used:  Underweight: BMI less than 18.5.  Normal weight: BMI between 18.5 and " 24.9.  Overweight: BMI between 25 and 29.9.  Obese: BMI of 30 or above.  BMI is a tool and cannot diagnose a condition. Talk with your health care provider about what your BMI means for you. Keep these notes in mind:  Weight includes fat and muscle. Someone with a muscular build, such as an athlete, may have a BMI that is higher than 24.9. In cases like these, BMI is not a correct measure of body fat.  If you have a BMI of 25 or higher, your provider may need to do more testing to find out if excess body fat is the cause.  BMI is measured the same way for males and females. Females usually have more body fat than males of the same height and weight.  Where to find more information  For more information about BMI, including tools to quickly find your BMI, go to:  Centers for Disease Control and Prevention: cdc.gov  American Heart Association: heart.org  National Heart, Lung, and Blood [place]: nhlbi.nih.gov  This information is not intended to replace advice given to you by your health care provider. Make sure you discuss any questions you have with your health care provider.  Document Revised: 09/07/2023 Document Reviewed: 08/31/2023  [employer] Patient Education © 2024 [employer]Heart-Healthy Eating Plan  Many factors influence your heart (coronary) health, including eating and exercise habits. Coronary risk increases with abnormal blood fat (lipid) levels. Heart-healthy meal planning includes limiting unhealthy fats, increasing healthy fats, and making other diet and lifestyle changes.  What is my plan?  Your health care provider may recommend that you:  Limit your fat intake to _________% or less of your total calories each day.  Limit your saturated fat intake to _________% or less of your total calories each day.  Limit the amount of cholesterol in your diet to less than _________ mg per day.  What are tips for following this plan?  Cooking  Cook foods using methods other than frying. Baking, boiling,  grilling, and broiling are all good options. Other ways to reduce fat include:  Removing the skin from poultry.  Removing all visible fats from meats.  Steaming vegetables in water or broth.  Meal planning  A plate with examples of foods in a healthy diet.      At meals, imagine dividing your plate into fourths:  Fill one-half of your plate with vegetables and green salads.  Fill one-fourth of your plate with whole grains.  Fill one-fourth of your plate with lean protein foods.  Eat 4-5 servings of vegetables per day. One serving equals 1 cup raw or cooked vegetable, or 2 cups raw leafy greens.  Eat 4-5 servings of fruit per day. One serving equals 1 medium whole fruit, ¼ cup dried fruit, ½ cup fresh, frozen, or canned fruit, or ½ cup 100% fruit juice.  Eat more foods that contain soluble fiber. Examples include apples, broccoli, carrots, beans, peas, and barley. Aim to get 25-30 g of fiber per day.  Increase your consumption of legumes, nuts, and seeds to 4-5 servings per week. One serving of dried beans or legumes equals ½ cup cooked, 1 serving of nuts is ¼ cup, and 1 serving of seeds equals 1 tablespoon.  Fats  Choose healthy fats more often. Choose monounsaturated and polyunsaturated fats, such as olive and canola oils, flaxseeds, walnuts, almonds, and seeds.  Eat more omega-3 fats. Choose salmon, mackerel, sardines, tuna, flaxseed oil, and ground flaxseeds. Aim to eat fish at least 2 times each week.  Check food labels carefully to identify foods with trans fats or high amounts of saturated fat.  Limit saturated fats. These are found in animal products, such as meats, butter, and cream. Plant sources of saturated fats include palm oil, palm kernel oil, and coconut oil.  Avoid foods with partially hydrogenated oils in them. These contain trans fats. Examples are stick margarine, some tub margarines, cookies, crackers, and other baked goods.  Avoid fried foods.  General information  Eat more home-cooked food and  less restaurant, buffet, and fast food.  Limit or avoid alcohol.  Limit foods that are high in starch and sugar.  Lose weight if you are overweight. Losing just 5-10% of your body weight can help your overall health and prevent diseases such as diabetes and heart disease.  Monitor your salt (sodium) intake, especially if you have high blood pressure. Talk with your health care provider about your sodium intake.  Try to incorporate more vegetarian meals weekly.  What foods can I eat?  Fruits  All fresh, canned (in natural juice), or frozen fruits.  Vegetables  Fresh or frozen vegetables (raw, steamed, roasted, or grilled). Green salads.  Grains  Most grains. Choose whole wheat and whole grains most of the time. Rice and pasta, including brown rice and pastas made with whole wheat.  Meats and other proteins  Lean, well-trimmed beef, veal, pork, and lamb. Chicken and turkey without skin. All fish and shellfish. Wild duck, rabbit, pheasant, and venison. Egg whites or low-cholesterol egg substitutes. Dried beans, peas, lentils, and tofu. Seeds and most nuts.  Dairy  Low-fat or nonfat cheeses, including ricotta and mozzarella. Skim or 1% milk (liquid, powdered, or evaporated). Buttermilk made with low-fat milk. Nonfat or low-fat yogurt.  Fats and oils  Non-hydrogenated (trans-free) margarines. Vegetable oils, including soybean, sesame, sunflower, olive, peanut, safflower, corn, canola, and cottonseed. Salad dressings or mayonnaise made with a vegetable oil.  Beverages  Water (mineral or sparkling). Coffee and tea. Diet carbonated beverages.  Sweets and desserts  Sherbet, gelatin, and fruit ice. Small amounts of dark chocolate.  Limit all sweets and desserts.  Seasonings and condiments  All seasonings and condiments.  The items listed above may not be a complete list of foods and beverages you can eat. Contact a dietitian for more options.  What foods are not recommended?  Fruits  Canned fruit in heavy syrup. Fruit in  cream or butter sauce. Fried fruit. Limit coconut.  Vegetables  Vegetables cooked in cheese, cream, or butter sauce. Fried vegetables.  Grains  Breads made with saturated or trans fats, oils, or whole milk. Croissants. Sweet rolls. Donuts. High-fat crackers, such as cheese crackers.  Meats and other proteins  Fatty meats, such as hot dogs, ribs, sausage, izquierdo, rib-eye roast or steak. High-fat deli meats, such as salami and bologna. Caviar. Domestic duck and goose. Organ meats, such as liver.  Dairy  Cream, sour cream, cream cheese, and creamed cottage cheese. Whole-milk cheeses. Whole or 2% milk (liquid, evaporated, or condensed). Whole buttermilk. Cream sauce or high-fat cheese sauce. Whole-milk yogurt.  Fats and oils  Meat fat, or shortening. Cocoa butter, hydrogenated oils, palm oil, coconut oil, palm kernel oil. Solid fats and shortenings, including izquierdo fat, salt pork, lard, and butter. Nondairy cream substitutes. Salad dressings with cheese or sour cream.  Beverages  Regular sodas and any drinks with added sugar.  Sweets and desserts  Frosting. Pudding. Cookies. Cakes. Pies. Milk chocolate or white chocolate. Buttered syrups. Full-fat ice cream or ice cream drinks.  The items listed above may not be a complete list of foods and beverages to avoid. Contact a dietitian for more information.  Summary  Heart-healthy meal planning includes limiting unhealthy fats, increasing healthy fats, and making other diet and lifestyle changes.  Lose weight if you are overweight. Losing just 5-10% of your body weight can help your overall health and prevent diseases such as diabetes and heart disease.  Focus on eating a balance of foods, including fruits and vegetables, low-fat or nonfat dairy, lean protein, nuts and legumes, whole grains, and heart-healthy oils and fats.  This information is not intended to replace advice given to you by your health care provider. Make sure you discuss any questions you have with your  health care provider.  Document Revised: 04/28/2022 Document Reviewed: 04/28/2022  Elsevier Patient Education © 2022 [employer] Inc[employer]

## 2025-06-17 NOTE — TELEPHONE ENCOUNTER
I spoke with a patient who wishes for her , Murray Davis (: 73), to be seen by Dr. Jaspal Lyon, I formed her was not currently accepting new patients, but I wanted to ask DR. Jaspal Lyon if he could be scheduled!     Mr. Davis has not been seen by a doctor in over three years and needs a physical examination with labs states his wife Laine. Laine would like him to be seen as soon as Dr. Jaspal Lyon is able to accept new patients.

## 2025-07-15 ENCOUNTER — OFFICE VISIT (OUTPATIENT)
Age: 55
End: 2025-07-15
Payer: COMMERCIAL

## 2025-07-15 VITALS
WEIGHT: 176.4 LBS | DIASTOLIC BLOOD PRESSURE: 70 MMHG | SYSTOLIC BLOOD PRESSURE: 118 MMHG | HEIGHT: 66 IN | BODY MASS INDEX: 28.35 KG/M2

## 2025-07-15 DIAGNOSIS — M20.011 MALLET FINGER OF RIGHT HAND: Primary | ICD-10-CM

## 2025-07-15 DIAGNOSIS — M18.0 ARTHRITIS OF CARPOMETACARPAL (CMC) JOINT OF BOTH THUMBS: Primary | ICD-10-CM

## 2025-07-15 RX ORDER — DEXAMETHASONE SODIUM PHOSPHATE 4 MG/ML
2 INJECTION, SOLUTION INTRA-ARTICULAR; INTRALESIONAL; INTRAMUSCULAR; INTRAVENOUS; SOFT TISSUE
Status: COMPLETED | OUTPATIENT
Start: 2025-07-15 | End: 2025-07-15

## 2025-07-15 RX ORDER — LIDOCAINE HYDROCHLORIDE 10 MG/ML
0.5 INJECTION, SOLUTION EPIDURAL; INFILTRATION; INTRACAUDAL; PERINEURAL
Status: COMPLETED | OUTPATIENT
Start: 2025-07-15 | End: 2025-07-15

## 2025-07-15 RX ADMIN — DEXAMETHASONE SODIUM PHOSPHATE 2 MG: 4 INJECTION, SOLUTION INTRA-ARTICULAR; INTRALESIONAL; INTRAMUSCULAR; INTRAVENOUS; SOFT TISSUE at 13:21

## 2025-07-15 RX ADMIN — LIDOCAINE HYDROCHLORIDE 0.5 ML: 10 INJECTION, SOLUTION EPIDURAL; INFILTRATION; INTRACAUDAL; PERINEURAL at 13:21

## 2025-07-15 NOTE — PROGRESS NOTES
Procedure   - Hand/Upper Extremity Injection: L thumb CMC for osteoarthritis on 7/15/2025 1:21 PM  Indications: pain  Details: 27 G needle, dorsal approach  Medications: 0.5 mL lidocaine PF 1% 1 %; 2 mg dexAMETHasone 4 MG/ML  Outcome: tolerated well, no immediate complications  Procedure, treatment alternatives, risks and benefits explained, specific risks discussed. Consent was given by the patient. Immediately prior to procedure a time out was called to verify the correct patient, procedure, equipment, support staff and site/side marked as required. Patient was prepped and draped in the usual sterile fashion.

## 2025-07-15 NOTE — PROGRESS NOTES
"                                                                 [facility]     Post Operative Office Visit      Date: 07/15/2025   Patient Name: Laine Hernandez  MRN: [id number]  YOB: 1970    Chief Complaint:   Chief Complaint   Patient presents with    Follow-up     6 week follow up Arthritis of carpometacarpal (CMC) joint of both thumbs     History of Present Illness:   Laine Hernandez is a 55 y.o. female status post right thumb CMC arthroplasty with MCP joint fusion, DOS 2/12/2025, with right thumb bony mallet injury and left thumb CMC arthritis.  The patient reports that her left thumb has become more painful lately and feels the injection is starting to wear off.  Additionally, she feels that her right thumb has been well since surgery but she continues to have stiffness at the thumb IP joint.  This has improved minimally since her last clinic visit.  No other complaints.    Subjective   Review of Systems:   Review of Systems     I reviewed the patient's chief complaint, history of present illness, review of systems, past medical history, surgical history, family history, social history, medications and allergy list in the EMR on 07/15/2025 and agree with the findings above.    Objective    Vital Signs:   Vitals:    07/15/25 1311   BP: 118/70   Weight: 80 kg (176 lb 6.4 oz)   Height: 167.6 cm (65.98\")       General Appearance: No acute distress. Alert and oriented.     Ortho Exam:  Examination of the left upper extremity:   No skin lesions or ecchymosis. No edema.   No thenar, hypothenar, or interosseous wasting appreciated   Tender to palpation over the thumb CMC joint   Nontender to palpation over the scaphoid  Full and painless active flexion/extension/pronosupination of the wrist  Able to make a composite fist  positive Thumb CMC joint grind   negative Thumb MCP joint hyper extension  Firm endpoint with radial/ulnar deviation of the thumb MCP at 0 and 30 degrees of " flexion  negative Finkelstein's test  Median/radial/ulnar sensory intact to light touch  Digits are warm and well-perfused      Examination of the right Upper Extremity:   Skin examination: Well-healed surgical incisions along the volar aspect of the thumb CMC joint and MCP joint   she is nontender diffusely throughout the thumb.  Able to make a composite fist the unaffected digits.  She is able to achieve 10 degrees of active thumb IP joint flexion, the thumb MCP joint is stable after fusion.  The thumb is stable to axial load.  AIN/PIN/Radial/Ulnar nerves grossly motor intact  Median/radial/ulnar sensory intact to light touch   Palpable radial pulse, digits are warm and well-perfused       Imaging / Studies:    Imaging Results (Last 24 Hours)       ** No results found for the last 24 hours. **          Procedure Note:  After reviewing the risks, benefits and alternatives to a steroid injection, which include but are not limited to; hypopigmentation, fat necrosis/atrophy, pain, swelling, bleeding, bruising, damage to nearby nerves/vessels, allergic reaction , transient elevation in blood glucose levels and infection a verbal consent was obtained. A time-out was then performed and the affected hand was prepped with chlorhexadine soap and ethyl chloride was used to numb the skin. The left thumb CMC joint was injected with 0.5cc: 0.5cc mixture of Lidocaine - 1% / 2 ml and dexamethasone 4 mg/mL. The injection was well tolerated and a sterile dressing was applied. There were no complications. I advised the patient that they might experience some local discomfort for the next couple days and can apply ice to the site as needed.    Assessment / Plan    Assessment/Plan:   Laine Hernandez is a 55 y.o. female status post right thumb CMC arthroplasty with MCP joint fusion, DOS 2/12/2025, with right thumb bony mallet injury and left thumb CMC arthritis.     Regarding the patient's left thumb, she is responded well to prior  injections to the thumb CMC joint.  We discussed her options moving forward including oral topical anti-inflammatories, brace wear, therapy, repeat injection, and surgery.  The patient is interested in repeat injection today into the left CMC joint.  This was provided and tolerated well.  She will follow-up with the PA in 3 months for repeat injection.    However regarding the patient's right thumb, she continues to have stiffness mostly to the thumb IP joint.  Postoperatively, her course was complicated with a bony mallet injury to her right thumb that required IP joint immobilization.  She has residual stiffness.  Difficulty with thumb IP joint flexion.  She has otherwise done well after her surgery.  The patient would continue to benefit from a course of hand therapy.  A new prescription was provided today.  I anticipate that her stiffness will continue to improve with time and therapy without any additional interventions.  She return to clinic in 3 months for reevaluation.      ICD-10-CM ICD-9-CM   1. Arthritis of carpometacarpal (CMC) joint of both thumbs  M18.0 716.94       Follow Up:   Return in about 3 months (around 10/15/2025) for Follow Up with Justa .      Dasia Martínez MD  [facility]

## 2025-07-28 ENCOUNTER — TREATMENT (OUTPATIENT)
Dept: PHYSICAL THERAPY | Facility: CLINIC | Age: 55
End: 2025-07-28
Payer: COMMERCIAL

## 2025-07-28 DIAGNOSIS — R29.898 WEAKNESS OF RIGHT HAND: ICD-10-CM

## 2025-07-28 DIAGNOSIS — M25.649 THUMB JOINT STIFFNESS: ICD-10-CM

## 2025-07-28 DIAGNOSIS — M24.541 CONTRACTURE OF RIGHT THUMB JOINT: Primary | ICD-10-CM

## 2025-07-28 NOTE — PROGRESS NOTES
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    Then sustained fracture to right thumb DIPJ with mallet finger deformity in April. Was placed in DIPJ immobilization splint.     Currently has significant stiffness, pain, and weakness in right thumb IPJ which has limited ability to  and hold objects, pinch, and is painful with passive and active movement.     Pain  Location: right thumb IP  Quality: dull ache, tight and sharp  Aggravating factors: lifting, movement and repetitive movement    Hand dominance: right    Patient Goals  Patient goals for therapy: decreased edema, decreased pain, increased motion, return to sport/leisure activities, independence with ADLs/IADLs and increased strength             Objective          Active Range of Motion     Right Thumb   Flexion     DIP: 10 degrees    Passive Range of Motion     Right Thumb   Flexion     DIP: 15 degrees    Additional Passive Range of Motion Details  Thumb IP flexion with firm end feel and pain.    Strength/Myotome Testing     Left Wrist/Hand      (2nd hand position)   Left  strength (2nd hand position) 45 lbs    Thumb Strength  Key/Lateral Pinch     Trial 1: 14 lbs    Right Wrist/Hand      (2nd hand position)   Right  strength (2nd hand position) 30 lbs    Thumb Strength   Key/Lateral Pinch     Trial 1: 8 lbs    Comments: Significant pain at thumb IPJ          Assessment & Plan       Assessment  Impairments: abnormal coordination, abnormal muscle firing,  abnormal muscle tone, abnormal or restricted ROM, activity intolerance, impaired physical strength, lacks appropriate home exercise program and pain with function   Functional limitations: carrying objects, lifting, sleeping, pulling, pushing, uncomfortable because of pain and unable to perform repetitive tasks   Assessment details: Patient is a 55 year old female presenting with severe right thumb IP joint stiffness with pain following fracture at IPJ sustained during a fall. Has history of right CMCJ arthroplasty with MCPJ fusion performed earlier this year. Signs and symptoms are consistent with capsular restriction and she has been limited with  and pinch strength and tolerance affecting ability to use dominant hand for ADLs and home tasks. A custom static progressive joint flexion splint was fabricated today and she is appropriate for physical therapy to address this issue.   Barriers to therapy: recent history of joint replacement and fusion on same hand.  Prognosis: good  Prognosis details: Short term goals(4 weeks):  1. Patient is fit with custom fabricated hand splint for static progressive IPJ flexion and is educated on use and precautions. MET   2. Patient is independent with initial HEP.   3. Patient demonstrates active thumb IP flexion of at least 15 degrees.       Long term goals (8 weeks):  1. Patient demonstrates active thumb IP flexion of at least 20 degrees.   2. Patient demonstrates right  strength of at least 45 lbs.   3. Patient demonstrates right lateal pinch strength of at least 12 lbs with pain no greater than 2/10.     Plan  Therapy options: will be seen for skilled therapy services  Planned modality interventions: thermotherapy (paraffin bath), thermotherapy (hydrocollator packs), TENS, high voltage pulsed current (spasm management), high voltage pulsed current (pain management) and cryotherapy  Planned therapy interventions: therapeutic activities, stretching, strengthening,  spinal/joint mobilization, soft tissue mobilization, postural training, neuromuscular re-education, motor coordination training, manual therapy, abdominal trunk stabilization, ADL retraining, joint mobilization, IADL retraining, home exercise program, functional ROM exercises, balance/weight-bearing training, body mechanics training, fine motor coordination training, flexibility and orthotic fitting/training  Frequency: 2x week  Duration in weeks: 8  Treatment plan discussed with: patient        Manual Therapy:         mins  51811;  Therapeutic Exercise:         mins  31432;     Neuromuscular Yemi:        mins  33967;    Therapeutic Activity:     32     mins  58157;     Gait Training:           mins  43302;     Ultrasound:         mins  28770;    Electrical Stimulation:         mins  02496 ( );  Dry Needling          mins self-pay    Timed Treatment:   32   mins   Total Treatment:     48   mins    PT SIGNATURE: Holly Baird, TYRELL   DATE TREATMENT INITIATED: 7/28/2025    Initial Certification  Certification Period: 10/26/2025  I certify that the therapy services are furnished while this patient is under my care.  The services outlined above are required by this patient, and will be reviewed every 90 days.     PHYSICIAN: Dasia Martínez MD      DATE:     Please sign and return via fax to [phone].. Thank you, [facility].

## 2025-07-31 RX ORDER — TIRZEPATIDE 2.5 MG/.5ML
2.5 INJECTION, SOLUTION SUBCUTANEOUS WEEKLY
Qty: 2 ML | Refills: 2 | Status: SHIPPED | OUTPATIENT
Start: 2025-07-31

## 2025-08-04 ENCOUNTER — TELEPHONE (OUTPATIENT)
Dept: OBSTETRICS AND GYNECOLOGY | Facility: CLINIC | Age: 55
End: 2025-08-04
Payer: COMMERCIAL

## 2025-08-05 ENCOUNTER — TREATMENT (OUTPATIENT)
Dept: PHYSICAL THERAPY | Facility: CLINIC | Age: 55
End: 2025-08-05
Payer: COMMERCIAL

## 2025-08-05 DIAGNOSIS — M25.649 THUMB JOINT STIFFNESS: ICD-10-CM

## 2025-08-05 DIAGNOSIS — R29.898 WEAKNESS OF RIGHT HAND: ICD-10-CM

## 2025-08-05 DIAGNOSIS — M24.541 CONTRACTURE OF RIGHT THUMB JOINT: Primary | ICD-10-CM

## 2025-08-05 PROCEDURE — 97110 THERAPEUTIC EXERCISES: CPT | Performed by: PHYSICAL THERAPIST

## 2025-08-05 PROCEDURE — 97140 MANUAL THERAPY 1/> REGIONS: CPT | Performed by: PHYSICAL THERAPIST

## 2025-08-05 PROCEDURE — 97018 PARAFFIN BATH THERAPY: CPT | Performed by: PHYSICAL THERAPIST

## 2025-08-05 RX ORDER — TIRZEPATIDE 2.5 MG/.5ML
2.5 INJECTION, SOLUTION SUBCUTANEOUS WEEKLY
Qty: 2 ML | Refills: 2 | Status: SHIPPED | OUTPATIENT
Start: 2025-08-05

## 2025-08-07 ENCOUNTER — TREATMENT (OUTPATIENT)
Dept: PHYSICAL THERAPY | Facility: CLINIC | Age: 55
End: 2025-08-07
Payer: COMMERCIAL

## 2025-08-07 DIAGNOSIS — M25.649 THUMB JOINT STIFFNESS: ICD-10-CM

## 2025-08-07 DIAGNOSIS — M24.541 CONTRACTURE OF RIGHT THUMB JOINT: Primary | ICD-10-CM

## 2025-08-07 DIAGNOSIS — R29.898 WEAKNESS OF RIGHT HAND: ICD-10-CM

## 2025-08-14 ENCOUNTER — TREATMENT (OUTPATIENT)
Dept: PHYSICAL THERAPY | Facility: CLINIC | Age: 55
End: 2025-08-14
Payer: COMMERCIAL

## 2025-08-14 DIAGNOSIS — R29.898 WEAKNESS OF RIGHT HAND: ICD-10-CM

## 2025-08-14 DIAGNOSIS — M24.541 CONTRACTURE OF RIGHT THUMB JOINT: Primary | ICD-10-CM

## 2025-08-14 DIAGNOSIS — M25.649 THUMB JOINT STIFFNESS: ICD-10-CM

## 2025-08-15 ENCOUNTER — OFFICE VISIT (OUTPATIENT)
Dept: OBSTETRICS AND GYNECOLOGY | Facility: CLINIC | Age: 55
End: 2025-08-15
Payer: COMMERCIAL

## 2025-08-15 VITALS
DIASTOLIC BLOOD PRESSURE: 80 MMHG | BODY MASS INDEX: 28.28 KG/M2 | SYSTOLIC BLOOD PRESSURE: 112 MMHG | WEIGHT: 176 LBS | HEIGHT: 66 IN

## 2025-08-15 DIAGNOSIS — N95.8 GENITOURINARY SYNDROME OF MENOPAUSE: ICD-10-CM

## 2025-08-15 DIAGNOSIS — N76.0 ACUTE VAGINITIS: Primary | ICD-10-CM

## 2025-08-15 DIAGNOSIS — R39.15 URINARY URGENCY: ICD-10-CM

## 2025-08-15 RX ORDER — TIRZEPATIDE 5 MG/.5ML
5 INJECTION, SOLUTION SUBCUTANEOUS WEEKLY
Qty: 2 ML | Refills: 6 | Status: SHIPPED | OUTPATIENT
Start: 2025-08-15

## 2025-08-17 LAB
BACTERIA UR CULT: NO GROWTH
BACTERIA UR CULT: NORMAL

## 2025-08-18 LAB
M GENITALIUM DNA SPEC QL NAA+PROBE: NEGATIVE
M HOMINIS DNA SPEC QL NAA+PROBE: NEGATIVE
UREAPLASMA DNA SPEC QL NAA+PROBE: NEGATIVE

## 2025-08-19 ENCOUNTER — TREATMENT (OUTPATIENT)
Dept: PHYSICAL THERAPY | Facility: CLINIC | Age: 55
End: 2025-08-19
Payer: COMMERCIAL

## 2025-08-19 DIAGNOSIS — M25.649 THUMB JOINT STIFFNESS: ICD-10-CM

## 2025-08-19 DIAGNOSIS — R29.898 WEAKNESS OF RIGHT HAND: ICD-10-CM

## 2025-08-19 DIAGNOSIS — M24.541 CONTRACTURE OF RIGHT THUMB JOINT: Primary | ICD-10-CM

## 2025-08-19 PROCEDURE — 97140 MANUAL THERAPY 1/> REGIONS: CPT | Performed by: PHYSICAL THERAPIST

## 2025-08-19 PROCEDURE — 97018 PARAFFIN BATH THERAPY: CPT | Performed by: PHYSICAL THERAPIST

## 2025-08-19 PROCEDURE — 97110 THERAPEUTIC EXERCISES: CPT | Performed by: PHYSICAL THERAPIST

## 2025-08-19 RX ORDER — PROGESTERONE 100 MG/1
100 CAPSULE ORAL DAILY
Qty: 90 CAPSULE | Refills: 3 | OUTPATIENT
Start: 2025-08-19

## 2025-08-20 LAB
A VAGINAE DNA VAG QL NAA+PROBE: ABNORMAL SCORE
BVAB2 DNA VAG QL NAA+PROBE: ABNORMAL SCORE
C ALBICANS DNA VAG QL NAA+PROBE: NEGATIVE
C GLABRATA DNA VAG QL NAA+PROBE: POSITIVE
C KRUSEI DNA VAG QL NAA+PROBE: NEGATIVE
C LUSITANIAE DNA VAG QL NAA+PROBE: NEGATIVE
CANDIDA DNA VAG QL NAA+PROBE: NEGATIVE
MEGA1 DNA VAG QL NAA+PROBE: ABNORMAL SCORE
T VAGINALIS DNA VAG QL NAA+PROBE: NEGATIVE

## 2025-08-26 ENCOUNTER — TELEPHONE (OUTPATIENT)
Dept: PHYSICAL THERAPY | Facility: CLINIC | Age: 55
End: 2025-08-26

## 2025-08-27 RX ORDER — BORIC ACID
600 POWDER (GRAM) MISCELLANEOUS
Qty: 14 SUPPOSITORY | Refills: 1 | Status: SHIPPED | OUTPATIENT
Start: 2025-08-27